# Patient Record
Sex: FEMALE | Race: WHITE | NOT HISPANIC OR LATINO | Employment: UNEMPLOYED
[De-identification: names, ages, dates, MRNs, and addresses within clinical notes are randomized per-mention and may not be internally consistent; named-entity substitution may affect disease eponyms.]

---

## 2020-07-30 ENCOUNTER — RECORDS - HEALTHEAST (OUTPATIENT)
Dept: ADMINISTRATIVE | Facility: OTHER | Age: 60
End: 2020-07-30

## 2021-05-11 ENCOUNTER — RECORDS - HEALTHEAST (OUTPATIENT)
Dept: ADMINISTRATIVE | Facility: OTHER | Age: 61
End: 2021-05-11

## 2021-07-14 ENCOUNTER — TELEPHONE (OUTPATIENT)
Dept: TRANSPLANT | Facility: CLINIC | Age: 61
End: 2021-07-14

## 2021-07-14 DIAGNOSIS — C90.01 MULTIPLE MYELOMA IN REMISSION (H): Primary | ICD-10-CM

## 2021-08-12 ENCOUNTER — MEDICAL CORRESPONDENCE (OUTPATIENT)
Dept: TRANSPLANT | Facility: CLINIC | Age: 61
End: 2021-08-12

## 2021-08-12 ENCOUNTER — OFFICE VISIT (OUTPATIENT)
Dept: TRANSPLANT | Facility: CLINIC | Age: 61
End: 2021-08-12
Attending: INTERNAL MEDICINE
Payer: COMMERCIAL

## 2021-08-12 VITALS
TEMPERATURE: 98 F | BODY MASS INDEX: 24.55 KG/M2 | HEART RATE: 82 BPM | OXYGEN SATURATION: 97 % | WEIGHT: 162 LBS | HEIGHT: 68 IN | SYSTOLIC BLOOD PRESSURE: 107 MMHG | DIASTOLIC BLOOD PRESSURE: 68 MMHG | RESPIRATION RATE: 16 BRPM

## 2021-08-12 DIAGNOSIS — C90.01 MULTIPLE MYELOMA IN REMISSION (H): ICD-10-CM

## 2021-08-12 PROCEDURE — G0463 HOSPITAL OUTPT CLINIC VISIT: HCPCS

## 2021-08-12 PROCEDURE — 99205 OFFICE O/P NEW HI 60 MIN: CPT

## 2021-08-12 PROCEDURE — 99417 PROLNG OP E/M EACH 15 MIN: CPT

## 2021-08-12 RX ORDER — LAMOTRIGINE 100 MG/1
300 TABLET ORAL AT BEDTIME
Status: ON HOLD | COMMUNITY
End: 2021-10-14

## 2021-08-12 ASSESSMENT — PAIN SCALES - GENERAL: PAINLEVEL: MILD PAIN (2)

## 2021-08-12 ASSESSMENT — MIFFLIN-ST. JEOR: SCORE: 1339.36
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
## 2021-08-12 NOTE — NURSING NOTE
"Oncology Rooming Note    August 12, 2021 2:03 PM   Mil Seals is a 61 year old female who presents for:    Chief Complaint   Patient presents with     Oncology Clinic Visit     Bone marrow transplant consultation      Initial Vitals: /68   Pulse 82   Temp 98  F (36.7  C)   Resp 16   Ht 1.721 m (5' 7.75\")   Wt 73.5 kg (162 lb)   SpO2 97%   BMI 24.81 kg/m   Estimated body mass index is 24.81 kg/m  as calculated from the following:    Height as of this encounter: 1.721 m (5' 7.75\").    Weight as of this encounter: 73.5 kg (162 lb). Body surface area is 1.87 meters squared.  Mild Pain (2) Comment: Data Unavailable   No LMP recorded. Patient is postmenopausal.  Allergies reviewed: Yes  Medications reviewed: Yes    Medications: Medication refills not needed today.  Pharmacy name entered into Ap[facility]: [id number] - [place], [address]    Clinical concerns: New patient       Peter Esteban MA            "

## 2021-08-13 ENCOUNTER — ALLIED HEALTH/NURSE VISIT (OUTPATIENT)
Dept: TRANSPLANT | Facility: CLINIC | Age: 61
End: 2021-08-13
Attending: INTERNAL MEDICINE
Payer: COMMERCIAL

## 2021-08-13 DIAGNOSIS — C90.00 MULTIPLE MYELOMA, REMISSION STATUS UNSPECIFIED (H): Primary | ICD-10-CM

## 2021-08-13 DIAGNOSIS — Z71.9 ENCOUNTER FOR COUNSELING: Primary | ICD-10-CM

## 2021-08-13 NOTE — PROGRESS NOTES
Spoke with Mil and Lori , patient's Other, following new transplant visit with Dr. Sridhar Cullen. Reviewed plan of care per NT conversation for Stem Cell Transplant. Explained role of the Nurse Coordinator throughout the BMT process as well as general time line and expectations for transplant. Discussed necessity of caregiver and program's proximity requirements. All questions were answered.     Plan: Autologous Transplant, pending completion of next cycle and arranging caregiver/local lodging    Contact information provided for :  yes    HLA typing drawn: n/a auto    PRA typing drawn:  N/a auto    CMV-IgG and ABO-Rh drawn or in record:  N/a auto    Contact information provided for :  deirdre    Financial Release for [facility] search obtained:  na    [id number] Consent Signed: na    EOC Reason updated: yes

## 2021-08-30 ENCOUNTER — SOCIAL WORK (OUTPATIENT)
Dept: TRANSPLANT | Facility: CLINIC | Age: 61
End: 2021-08-30

## 2021-08-30 NOTE — PROGRESS NOTES
Clinical   Blood and Marrow Transplant Service    Reason for Intervention: [place] Travel Reimbursement    Data: Pt is a 61 year old female diagnosed with Multiple Myeloma. Pt was diagnosed on 4/22/2021. Pt came for autologous stem cell transplant discussion.    Intervention: Pt has travel reimbursement through [employer] through [facility]. TYRONE spoke with -Sandra at [employer] (P: [phone]) and she said SW needs to call [facility] to get travel benefit information. SW called [facility] (P: [phone]) and found the following information:      Lodging - They will cover up to $70/day of the lodging stay. Member has to pay in advance and [place] will reimburse. [facility] no longer directly pays hotels in the area. Nor will they pay pt in advance. In order to be reimbursed for lodging pt has to call [facility] (P: [phone]) to get approved for lodging and reimbursed. [facility] do not have a reimbursement form for lodging.      Gas/Mileage - [facility] will reimburse for gas and mileage but you have to complete the trip form attached to this email and send back to [facility]. Form provided to pt.     Food - [facility] will reimburse for food up to $10 a meal per person. They will reimburse for up to $10 per meal for both caregiver and patient.     TYRONE also found out that [facility] s contract with [place] is ending 11/1/2021 and the company [employer] will be taking over for [place]. [employer] may or may not have travel benefits. That is to be determined.     Pt also wanted to know who she can call to set up up discounted lodging at the [facility] in [place], [place]. Provided Sara Alex- of Sales, [facility] (Phone: [phone]).     TYRONE sent all this information to pt via e-mail per pt's request. TYRONE encouraged pt to call/e-mail if she has any questions at all.     Education Provided: [facility] Travel Reimbursement    Follow-up Required: TYRONE will continue to work with this pt once she comes for transplant.    Encouraged patient/family to reach  out as questions or concerns arise.     JEMIMA Avery, LICSW  [facility]  Phone: [phone]  Pager: [phone]

## 2021-09-10 DIAGNOSIS — Z86.2 PERSONAL HISTORY OF DISEASES OF BLOOD AND BLOOD-FORMING ORGANS: ICD-10-CM

## 2021-09-10 DIAGNOSIS — C90.00 MYELOMA (H): ICD-10-CM

## 2021-09-14 ENCOUNTER — LAB (OUTPATIENT)
Dept: LAB | Facility: CLINIC | Age: 61
End: 2021-09-14
Payer: COMMERCIAL

## 2021-09-14 DIAGNOSIS — C90.00 MULTIPLE MYELOMA, REMISSION STATUS UNSPECIFIED (H): Primary | ICD-10-CM

## 2021-09-14 PROCEDURE — 88321 CONSLTJ&REPRT SLD PREP ELSWR: CPT | Mod: 26 | Performed by: PATHOLOGY

## 2021-09-15 ENCOUNTER — TELEPHONE (OUTPATIENT)
Dept: TRANSPLANT | Facility: CLINIC | Age: 61
End: 2021-09-15

## 2021-09-15 NOTE — TELEPHONE ENCOUNTER
Returned voicemail from patient regarding availably of caregiver during the work up week. Pt indicated that the caregiver would be available by phone for some of them. I returned the call and left a voice mail indicating it would be beneficial for the caregiver to join us for the pt teaching appointments as well as the close, but their physical presence is not required. Also recommended for the caregiver to be present at the line care class, and we could work on getting that rescheduled if there was a time the caregiver would be able to attend.

## 2021-09-16 ENCOUNTER — LAB (OUTPATIENT)
Dept: LAB | Facility: CLINIC | Age: 61
End: 2021-09-16
Payer: COMMERCIAL

## 2021-09-16 DIAGNOSIS — C90.00 MYELOMA (H): Primary | ICD-10-CM

## 2021-09-16 LAB
PATH REPORT.COMMENTS IMP SPEC: ABNORMAL
PATH REPORT.COMMENTS IMP SPEC: NORMAL
PATH REPORT.COMMENTS IMP SPEC: YES
PATH REPORT.FINAL DX SPEC: ABNORMAL
PATH REPORT.FINAL DX SPEC: NORMAL
PATH REPORT.GROSS SPEC: ABNORMAL
PATH REPORT.GROSS SPEC: NORMAL
PATH REPORT.MICROSCOPIC SPEC OTHER STN: ABNORMAL
PATH REPORT.MICROSCOPIC SPEC OTHER STN: NORMAL
PATH REPORT.RELEVANT HX SPEC: ABNORMAL
PATH REPORT.RELEVANT HX SPEC: ABNORMAL
PATH REPORT.RELEVANT HX SPEC: NORMAL
PATH REPORT.RELEVANT HX SPEC: NORMAL
PATH REPORT.SITE OF ORIGIN SPEC: ABNORMAL
PATH REPORT.SITE OF ORIGIN SPEC: NORMAL

## 2021-09-16 PROCEDURE — 88321 CONSLTJ&REPRT SLD PREP ELSWR: CPT | Mod: 26 | Performed by: PATHOLOGY

## 2021-09-20 ENCOUNTER — MEDICAL CORRESPONDENCE (OUTPATIENT)
Dept: TRANSPLANT | Facility: CLINIC | Age: 61
End: 2021-09-20

## 2021-09-20 ENCOUNTER — ALLIED HEALTH/NURSE VISIT (OUTPATIENT)
Dept: TRANSPLANT | Facility: CLINIC | Age: 61
End: 2021-09-20
Attending: INTERNAL MEDICINE
Payer: COMMERCIAL

## 2021-09-20 ENCOUNTER — OFFICE VISIT (OUTPATIENT)
Dept: EDUCATION SERVICES | Facility: CLINIC | Age: 61
End: 2021-09-20
Attending: INTERNAL MEDICINE
Payer: COMMERCIAL

## 2021-09-20 ENCOUNTER — HOSPITAL ENCOUNTER (OUTPATIENT)
Dept: LAB | Facility: CLINIC | Age: 61
End: 2021-09-20
Payer: COMMERCIAL

## 2021-09-20 VITALS
TEMPERATURE: 98.6 F | BODY MASS INDEX: 25.33 KG/M2 | HEART RATE: 83 BPM | RESPIRATION RATE: 16 BRPM | OXYGEN SATURATION: 98 % | HEIGHT: 67 IN | WEIGHT: 161.4 LBS | SYSTOLIC BLOOD PRESSURE: 92 MMHG | DIASTOLIC BLOOD PRESSURE: 63 MMHG

## 2021-09-20 VITALS — DIASTOLIC BLOOD PRESSURE: 56 MMHG | SYSTOLIC BLOOD PRESSURE: 105 MMHG | HEART RATE: 87 BPM

## 2021-09-20 DIAGNOSIS — C90.00 MYELOMA (H): ICD-10-CM

## 2021-09-20 DIAGNOSIS — Z86.2 PERSONAL HISTORY OF DISEASES OF BLOOD AND BLOOD-FORMING ORGANS: ICD-10-CM

## 2021-09-20 DIAGNOSIS — C90.00 MULTIPLE MYELOMA, REMISSION STATUS UNSPECIFIED (H): ICD-10-CM

## 2021-09-20 DIAGNOSIS — C90.00 MULTIPLE MYELOMA, REMISSION STATUS UNSPECIFIED (H): Primary | ICD-10-CM

## 2021-09-20 LAB
ABO/RH(D): ABNORMAL
ALBUMIN SERPL-MCNC: 3.6 G/DL (ref 3.4–5)
ALBUMIN UR-MCNC: 30 MG/DL
ALP SERPL-CCNC: 50 U/L (ref 40–150)
ALT SERPL W P-5'-P-CCNC: 20 U/L (ref 0–50)
ANION GAP SERPL CALCULATED.3IONS-SCNC: 5 MMOL/L (ref 3–14)
ANTIBODY SCREEN: POSITIVE
APPEARANCE UR: CLEAR
APTT PPP: 28 SECONDS (ref 22–38)
AST SERPL W P-5'-P-CCNC: 14 U/L (ref 0–45)
BASOPHILS # BLD AUTO: 0.1 10E3/UL (ref 0–0.2)
BASOPHILS NFR BLD AUTO: 2 %
BILIRUB SERPL-MCNC: 0.3 MG/DL (ref 0.2–1.3)
BILIRUB UR QL STRIP: NEGATIVE
BUN SERPL-MCNC: 15 MG/DL (ref 7–30)
CALCIUM SERPL-MCNC: 9.2 MG/DL (ref 8.5–10.1)
CHLORIDE BLD-SCNC: 110 MMOL/L (ref 94–109)
CO2 SERPL-SCNC: 28 MMOL/L (ref 20–32)
COLOR UR AUTO: YELLOW
CREAT SERPL-MCNC: 0.71 MG/DL (ref 0.52–1.04)
EOSINOPHIL # BLD AUTO: 0.3 10E3/UL (ref 0–0.7)
EOSINOPHIL NFR BLD AUTO: 5 %
ERYTHROCYTE [DISTWIDTH] IN BLOOD BY AUTOMATED COUNT: 14.9 % (ref 10–15)
GFR SERPL CREATININE-BSD FRML MDRD: >90 ML/MIN/1.73M2
GLUCOSE BLD-MCNC: 92 MG/DL (ref 70–99)
GLUCOSE UR STRIP-MCNC: NEGATIVE MG/DL
HCG SERPL QL: NEGATIVE
HCT VFR BLD AUTO: 39.6 % (ref 35–47)
HGB BLD-MCNC: 12.9 G/DL (ref 11.7–15.7)
HGB UR QL STRIP: NEGATIVE
HOLD SPECIMEN: NORMAL
IMM GRANULOCYTES # BLD: 0 10E3/UL
IMM GRANULOCYTES NFR BLD: 0 %
INR PPP: 1 (ref 0.85–1.15)
KETONES UR STRIP-MCNC: NEGATIVE MG/DL
LDH SERPL L TO P-CCNC: 168 U/L (ref 81–234)
LEUKOCYTE ESTERASE UR QL STRIP: NEGATIVE
LYMPHOCYTES # BLD AUTO: 1.4 10E3/UL (ref 0.8–5.3)
LYMPHOCYTES NFR BLD AUTO: 27 %
MAGNESIUM SERPL-MCNC: 2.2 MG/DL (ref 1.6–2.3)
MCH RBC QN AUTO: 29.9 PG (ref 26.5–33)
MCHC RBC AUTO-ENTMCNC: 32.6 G/DL (ref 31.5–36.5)
MCV RBC AUTO: 92 FL (ref 78–100)
MONOCYTES # BLD AUTO: 0.4 10E3/UL (ref 0–1.3)
MONOCYTES NFR BLD AUTO: 8 %
MUCOUS THREADS #/AREA URNS LPF: PRESENT /LPF
NEUTROPHILS # BLD AUTO: 3 10E3/UL (ref 1.6–8.3)
NEUTROPHILS NFR BLD AUTO: 58 %
NITRATE UR QL: NEGATIVE
NRBC # BLD AUTO: 0 10E3/UL
NRBC BLD AUTO-RTO: 0 /100
PH UR STRIP: 7 [PH] (ref 5–7)
PHOSPHATE SERPL-MCNC: 3 MG/DL (ref 2.5–4.5)
PLATELET # BLD AUTO: 286 10E3/UL (ref 150–450)
POTASSIUM BLD-SCNC: 4.1 MMOL/L (ref 3.4–5.3)
PROT SERPL-MCNC: 6.8 G/DL (ref 6.8–8.8)
RBC # BLD AUTO: 4.32 10E6/UL (ref 3.8–5.2)
RBC URINE: <1 /HPF
SODIUM SERPL-SCNC: 143 MMOL/L (ref 133–144)
SP GR UR STRIP: 1.02 (ref 1–1.03)
SPECIMEN EXPIRATION DATE: ABNORMAL
SQUAMOUS EPITHELIAL: 3 /HPF
TOTAL PROTEIN SERUM FOR ELP: 6.4 G/DL (ref 6.8–8.8)
TROPONIN I SERPL-MCNC: <0.015 UG/L (ref 0–0.04)
URATE SERPL-MCNC: 2.4 MG/DL (ref 2.6–6)
UROBILINOGEN UR STRIP-MCNC: 2 MG/DL
WBC # BLD AUTO: 5.1 10E3/UL (ref 4–11)
WBC URINE: 3 /HPF

## 2021-09-20 PROCEDURE — 84155 ASSAY OF PROTEIN SERUM: CPT | Mod: 91

## 2021-09-20 PROCEDURE — 82232 ASSAY OF BETA-2 PROTEIN: CPT

## 2021-09-20 PROCEDURE — 84484 ASSAY OF TROPONIN QUANT: CPT

## 2021-09-20 PROCEDURE — 84165 PROTEIN E-PHORESIS SERUM: CPT | Mod: 26 | Performed by: STUDENT IN AN ORGANIZED HEALTH CARE EDUCATION/TRAINING PROGRAM

## 2021-09-20 PROCEDURE — 83883 ASSAY NEPHELOMETRY NOT SPEC: CPT

## 2021-09-20 PROCEDURE — 82040 ASSAY OF SERUM ALBUMIN: CPT

## 2021-09-20 PROCEDURE — G0463 HOSPITAL OUTPT CLINIC VISIT: HCPCS

## 2021-09-20 PROCEDURE — 94729 DIFFUSING CAPACITY: CPT | Performed by: INTERNAL MEDICINE

## 2021-09-20 PROCEDURE — U0003 INFECTIOUS AGENT DETECTION BY NUCLEIC ACID (DNA OR RNA); SEVERE ACUTE RESPIRATORY SYNDROME CORONAVIRUS 2 (SARS-COV-2) (CORONAVIRUS DISEASE [COVID-19]), AMPLIFIED PROBE TECHNIQUE, MAKING USE OF HIGH THROUGHPUT TECHNOLOGIES AS DESCRIBED BY CMS-2020-01-R: HCPCS

## 2021-09-20 PROCEDURE — 85610 PROTHROMBIN TIME: CPT

## 2021-09-20 PROCEDURE — 81001 URINALYSIS AUTO W/SCOPE: CPT

## 2021-09-20 PROCEDURE — 82785 ASSAY OF IGE: CPT

## 2021-09-20 PROCEDURE — 87516 HEPATITIS B DNA AMP PROBE: CPT

## 2021-09-20 PROCEDURE — 83615 LACTATE (LD) (LDH) ENZYME: CPT

## 2021-09-20 PROCEDURE — 94060 EVALUATION OF WHEEZING: CPT | Performed by: INTERNAL MEDICINE

## 2021-09-20 PROCEDURE — 86334 IMMUNOFIX E-PHORESIS SERUM: CPT | Mod: 26 | Performed by: STUDENT IN AN ORGANIZED HEALTH CARE EDUCATION/TRAINING PROGRAM

## 2021-09-20 PROCEDURE — 86900 BLOOD TYPING SEROLOGIC ABO: CPT

## 2021-09-20 PROCEDURE — 84550 ASSAY OF BLOOD/URIC ACID: CPT

## 2021-09-20 PROCEDURE — 86334 IMMUNOFIX E-PHORESIS SERUM: CPT | Mod: TC

## 2021-09-20 PROCEDURE — 86695 HERPES SIMPLEX TYPE 1 TEST: CPT

## 2021-09-20 PROCEDURE — 86665 EPSTEIN-BARR CAPSID VCA: CPT

## 2021-09-20 PROCEDURE — 83021 HEMOGLOBIN CHROMOTOGRAPHY: CPT

## 2021-09-20 PROCEDURE — 86850 RBC ANTIBODY SCREEN: CPT

## 2021-09-20 PROCEDURE — 84100 ASSAY OF PHOSPHORUS: CPT

## 2021-09-20 PROCEDURE — 36415 COLL VENOUS BLD VENIPUNCTURE: CPT

## 2021-09-20 PROCEDURE — 82784 ASSAY IGA/IGD/IGG/IGM EACH: CPT

## 2021-09-20 PROCEDURE — 84703 CHORIONIC GONADOTROPIN ASSAY: CPT

## 2021-09-20 PROCEDURE — 86696 HERPES SIMPLEX TYPE 2 TEST: CPT

## 2021-09-20 PROCEDURE — 94726 PLETHYSMOGRAPHY LUNG VOLUMES: CPT | Performed by: INTERNAL MEDICINE

## 2021-09-20 PROCEDURE — 86870 RBC ANTIBODY IDENTIFICATION: CPT

## 2021-09-20 PROCEDURE — 86803 HEPATITIS C AB TEST: CPT

## 2021-09-20 PROCEDURE — 85730 THROMBOPLASTIN TIME PARTIAL: CPT

## 2021-09-20 PROCEDURE — 0001U RBC DNA HEA 35 AG 11 BLD GRP: CPT

## 2021-09-20 PROCEDURE — 85025 COMPLETE CBC W/AUTO DIFF WBC: CPT

## 2021-09-20 PROCEDURE — 84165 PROTEIN E-PHORESIS SERUM: CPT | Mod: TC | Performed by: STUDENT IN AN ORGANIZED HEALTH CARE EDUCATION/TRAINING PROGRAM

## 2021-09-20 PROCEDURE — 88240 CELL CRYOPRESERVE/STORAGE: CPT

## 2021-09-20 PROCEDURE — 83735 ASSAY OF MAGNESIUM: CPT

## 2021-09-20 RX ORDER — GABAPENTIN 100 MG/1
100 CAPSULE ORAL 2 TIMES DAILY
COMMUNITY
End: 2021-11-03

## 2021-09-20 RX ORDER — L. ACIDOPHILUS/PECTIN, CITRUS 25MM-100MG
2 TABLET ORAL 2 TIMES DAILY
Status: ON HOLD | COMMUNITY
End: 2021-10-15

## 2021-09-20 RX ORDER — OMEGA-3/DHA/EPA/FISH OIL 60 MG-90MG
1500 CAPSULE ORAL DAILY
Status: ON HOLD | COMMUNITY
End: 2021-10-15

## 2021-09-20 RX ORDER — ACYCLOVIR 400 MG/1
400 TABLET ORAL
COMMUNITY
End: 2021-09-20

## 2021-09-20 RX ORDER — HYDROCORTISONE 2.5 %
CREAM (GRAM) TOPICAL 2 TIMES DAILY PRN
Status: ON HOLD | COMMUNITY
End: 2021-10-14

## 2021-09-20 RX ORDER — LORATADINE 10 MG/1
10 TABLET ORAL AT BEDTIME
COMMUNITY

## 2021-09-20 RX ORDER — ALBUTEROL SULFATE 90 UG/1
2 AEROSOL, METERED RESPIRATORY (INHALATION) EVERY 4 HOURS PRN
COMMUNITY

## 2021-09-20 RX ORDER — BUDESONIDE AND FORMOTEROL FUMARATE DIHYDRATE 160; 4.5 UG/1; UG/1
2 AEROSOL RESPIRATORY (INHALATION) 2 TIMES DAILY
COMMUNITY

## 2021-09-20 RX ORDER — PROCHLORPERAZINE MALEATE 10 MG
10 TABLET ORAL EVERY 6 HOURS PRN
Status: ON HOLD | COMMUNITY
End: 2021-10-15

## 2021-09-20 RX ORDER — PRAZOSIN HYDROCHLORIDE 1 MG/1
1 CAPSULE ORAL AT BEDTIME
COMMUNITY

## 2021-09-20 RX ORDER — LORAZEPAM 0.5 MG/1
0.5 TABLET ORAL
COMMUNITY
End: 2021-11-12

## 2021-09-20 ASSESSMENT — MIFFLIN-ST. JEOR: SCORE: 1325.18

## 2021-09-20 ASSESSMENT — PAIN SCALES - GENERAL: PAINLEVEL: NO PAIN (0)

## 2021-09-20 NOTE — CONSULTS
APHERESIS INITIAL CONSULT CHECKLIST    Current Encounter Information  Current Encounter Information: Reason for Visit, Allergies and Current Meds  Procedure Requested: MNC/PBSC Collection  History of: (Reason for Apheresis): MM    Access Assessment  Access Assessment  Vein Assessment:  Veins are adequate: Yes  Needs a catheter placed for Apheresis?: Yes, transfusion medicine physician informed.    Vital Signs  Vital Signs  BP: 105/56  Pulse: 87  Temp:  (98.6)  Height:  (170.3CM)  Weight:  (73.2KG)    Reviewed   Review With Patient  Have you read the brochure Getting ready for Apheresis?: Yes  Have you had any invasive procedures, surgery, biopsy, bleeding in the last month?: No  Review medications and allergies: Yes  Patient given tour of the unit: Yes    Additional Information  Notes, needs and time spent with patient  Explain procedure, side effects or reactions, instructions: Yes  Time spent: 20 minutes spent face to face for medical history review. Reviewed need to follow low fat diet.

## 2021-09-20 NOTE — PROGRESS NOTES
Patient here for work up day. Height, weight, vital signs obtained. Med/allergy review completed. Calendar reviewed and patient was educated on tests/procedures. Blood thinners assessed. Consents obtained. Labs drawn via venipuncture. Patient provided UA sample, sent to lab. Provided patient with 24hr urine kit, she intends to return it on Wednesday morning. Patient discharged in the care of self to next work up appointment.  Patient aware of next appointment.        BMT Teaching Flowsheet    Mil Seals is a 61 year old female  Diagnoses of Personal history of diseases of blood and blood-forming organs, Myeloma (H), and Multiple myeloma, remission status unspecified (H) were pertinent to this visit.    Teaching Topic:work up routine  Person(s) involved in teaching: Patient  Motivation Level  Asks Questions: Yes  Eager to Learn: Yes  Cooperative: Yes  Receptive (willing/able to accept information): Yes    Patient demonstrates understanding of the following:  - Reason for the appointment, diagnosis and treatment plan: Yes     - Which situations necessitate calling provider and whom to contact: Yes    Teaching concerns addressed: reviewed calendar and explained all unfamiliar tests and locations of procedures  Pt instructed to check with  daily for schedule changes    Patient instructed on hand hygiene: Yes      Instructional Materials Used/Given:verbal, copy of calendar, map of campus.     Specific Concerns: NA

## 2021-09-20 NOTE — CONSULTS
Transfusion Medicine Consultation    Mil Seals [id number]   YOB: 1960 Age: 61 year old   Date of Consult: 9/20/2021     Reason for consult: Autologous Hematopoietic Progenitor Cell (HPC)  Collection           Assessment and Plan:   61 year old female presents for consultation for autologous HPC collection for multiple myeloma.  The plan is to collect for 1 to 3 days or until the target goal is met.   The patient will require line placement for the collection procedure..          Chief Complaint:   Transfusion medicine consultation.         History of Present Illness:   61 year old female presents for consultation for autologous  HPC  collection.  Her past medical history includes multiple myeloma.  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  She has received radiation and additional treatment for her myeloma.  She is currently well.   No significant travel history.  The patient has no identifiable risk factors for infectious disease.  The procedure, risks/benefits were discussed with the patient and all of her questions were addressed at this time.  Consent was obtained             Past Medical History:   Multiple myeloma  Asthma          Past Surgical History:   No past surgical history on file.           Social History:   Lives in [facility].            Allergies:     Allergies   Allergen Reactions     Azithromycin Nausea     Codeine      Hydrocodone Nausea and Vomiting     Morphine Nausea and Vomiting     Mushroom      fungus     Penicillins      As child     Sulfa Drugs Rash             Medications:     Current Outpatient Medications   Medication Sig     albuterol (PROAIR HFA/PROVENTIL HFA/VENTOLIN HFA) 108 (90 Base) MCG/ACT inhaler Inhale 2 puffs into the lungs every 4 hours as needed for shortness of breath / dyspnea or wheezing     Ascorbic Acid (VITAMIN C) 500 MG CHEW      budesonide-formoterol  (SYMBICORT) 160-4.5 MCG/ACT Inhaler Inhale 2 puffs into the lungs 2 times daily As needed     calcium carbonate (OS-BRUNA) 1500 (600 Ca) MG tablet Take 1,530 mg by mouth 2 times daily (with meals)     fish oil-omega-3 fatty acids 500 MG capsule      gabapentin (NEURONTIN) 100 MG capsule Take 100 mg by mouth 2 times daily Taking prn     hydrocortisone 2.5 % cream Apply topically 2 times daily For vaginal dryness     Lactobacillus Acid-Pectin (LACTOBACILLUS ACIDOPHILUS) TABS Take 1 tablet by mouth 3 times daily (before meals)     lamoTRIgine (LAMICTAL) 100 MG tablet Take 300 mg by mouth daily     loratadine (CLARITIN) 10 MG tablet Take 10 mg by mouth daily     LORazepam (ATIVAN) 0.5 MG tablet Take 0.5 mg by mouth nightly as needed for anxiety     prazosin (MINIPRESS) 1 MG capsule Take 1 mg by mouth 3 times daily     prochlorperazine (COMPAZINE) 10 MG tablet Take 10 mg by mouth every 6 hours as needed for nausea or vomiting     vitamin D3 (CHOLECALCIFEROL) 250 mcg (37988 units) capsule Take 1 capsule by mouth daily     Nutritional Supplements (ADULT NUTRITIONAL SUPPLEMENT + OR) DIM 900mg daily     Nutritional Supplements (ADULT NUTRITIONAL SUPPLEMENT + OR) Tryphylla 12,000mg daily     Nutritional Supplements (ADULT NUTRITIONAL SUPPLEMENT + OR) Calm powder- magnesium/calcium supplement     No current facility-administered medications for this encounter.             Review of Systems:     CONSTITUTIONAL: negative for  fevers and chills  RESPIRATORY:  positive for  Shortness of breath related to her asthma, has been using her inhaler  negative for  cough  CARDIOVASCULAR:  negative for  chest pain  GASTROINTESTINAL:  negative for nausea, vomiting and change in bowel habits  NEUROLOGICAL:  positive for headaches.  She notes some tingling when she started chemotherapy but this has improved.  Negative for seizures.           Exam:   /56   Pulse 87    T 98.6    Alert, no apparent distress  Breathing appears comfortable on  room air            Data:       BMPRecent Labs   Lab 09/20/21  1216      POTASSIUM 4.1   CHLORIDE 110*   BRUNA 9.2   CO2 28   BUN 15   CR 0.71   GLC 92     CBC  Recent Labs   Lab 09/20/21  1216   WBC 5.1   RBC 4.32   HGB 12.9   HCT 39.6   MCV 92   MCH 29.9   MCHC 32.6   RDW 14.9        INR  Recent Labs   Lab 09/20/21  1215   INR 1.00           The patient was directly seen and evaluated by me, Dyllan Piña MD.  The pathology resident, Simone Garces, was also present for the evaluation.    Dyllan Piña MD  Transfusion Medicine Attending  Laboratory Medicine and Pathology  Pager [phone]

## 2021-09-21 ENCOUNTER — HOSPITAL ENCOUNTER (OUTPATIENT)
Dept: PET IMAGING | Facility: CLINIC | Age: 61
Discharge: HOME | End: 2021-09-21
Attending: INTERNAL MEDICINE | Admitting: INTERNAL MEDICINE
Payer: COMMERCIAL

## 2021-09-21 DIAGNOSIS — C90.00 MYELOMA (H): ICD-10-CM

## 2021-09-21 DIAGNOSIS — Z86.2 PERSONAL HISTORY OF DISEASES OF BLOOD AND BLOOD-FORMING ORGANS: ICD-10-CM

## 2021-09-21 LAB
B2 MICROGLOB TUMOR MARKER SER-MCNC: 2 MG/L
DLCOCOR-%PRED-PRE: 97 %
DLCOCOR-PRE: 21.69 ML/MIN/MMHG
DLCOUNC-%PRED-PRE: 96 %
DLCOUNC-PRE: 21.35 ML/MIN/MMHG
DLCOUNC-PRED: 22.16 ML/MIN/MMHG
EBV VCA IGG SER IA-ACNC: 104 U/ML
EBV VCA IGG SER IA-ACNC: POSITIVE
ERV-%PRED-PRE: 54 %
ERV-PRE: 0.48 L
ERV-PRED: 0.89 L
EXPTIME-PRE: 7.82 SEC
FEF2575-%PRED-POST: 63 %
FEF2575-%PRED-PRE: 28 %
FEF2575-POST: 1.48 L/SEC
FEF2575-PRE: 0.67 L/SEC
FEF2575-PRED: 2.33 L/SEC
FEFMAX-%PRED-PRE: 58 %
FEFMAX-PRE: 3.88 L/SEC
FEFMAX-PRED: 6.65 L/SEC
FEV1-%PRED-PRE: 53 %
FEV1-PRE: 1.44 L
FEV1FEV6-PRE: 62 %
FEV1FEV6-PRED: 80 %
FEV1FVC-PRE: 59 %
FEV1FVC-PRED: 79 %
FEV1SVC-PRE: 54 %
FEV1SVC-PRED: 75 %
FIFMAX-PRE: 3.68 L/SEC
FRCPLETH-%PRED-PRE: 152 %
FRCPLETH-PRE: 4.38 L
FRCPLETH-PRED: 2.87 L
FVC-%PRED-PRE: 71 %
FVC-PRE: 2.45 L
FVC-PRED: 3.44 L
HSV1 IGG SERPL QL IA: 5.75 INDEX
HSV1 IGG SERPL QL IA: ABNORMAL
HSV2 IGG SERPL QL IA: 0.04 INDEX
HSV2 IGG SERPL QL IA: ABNORMAL
IC-%PRED-PRE: 80 %
IC-PRE: 2.19 L
IC-PRED: 2.72 L
IGA SERPL-MCNC: 10 MG/DL (ref 84–499)
IGG SERPL-MCNC: 388 MG/DL (ref 610–1616)
IGM SERPL-MCNC: <10 MG/DL (ref 35–242)
KAPPA LC FREE SER-MCNC: 0.56 MG/DL (ref 0.33–1.94)
KAPPA LC FREE/LAMBDA FREE SER NEPH: 2.33 {RATIO} (ref 0.26–1.65)
LAMBDA LC FREE SERPL-MCNC: 0.24 MG/DL (ref 0.57–2.63)
PROT PATTERN SERPL IFE-IMP: NORMAL
RVPLETH-%PRED-PRE: 188 %
RVPLETH-PRE: 3.9 L
RVPLETH-PRED: 2.07 L
SARS-COV-2 RNA RESP QL NAA+PROBE: NEGATIVE
TLCPLETH-%PRED-PRE: 120 %
TLCPLETH-PRE: 6.57 L
TLCPLETH-PRED: 5.44 L
VA-%PRED-PRE: 88 %
VA-PRE: 4.8 L
VC-%PRED-PRE: 74 %
VC-PRE: 2.68 L
VC-PRED: 3.61 L

## 2021-09-21 PROCEDURE — 84166 PROTEIN E-PHORESIS/URINE/CSF: CPT | Mod: 26 | Performed by: PATHOLOGY

## 2021-09-21 PROCEDURE — A9552 F18 FDG: HCPCS | Performed by: INTERNAL MEDICINE

## 2021-09-21 PROCEDURE — 81050 URINALYSIS VOLUME MEASURE: CPT | Performed by: PATHOLOGY

## 2021-09-21 PROCEDURE — 343N000001 HC RX 343: Performed by: INTERNAL MEDICINE

## 2021-09-21 PROCEDURE — 86335 IMMUNFIX E-PHORSIS/URINE/CSF: CPT | Mod: 26 | Performed by: PATHOLOGY

## 2021-09-21 PROCEDURE — 78816 PET IMAGE W/CT FULL BODY: CPT | Mod: 26 | Performed by: RADIOLOGY

## 2021-09-21 PROCEDURE — 78816 PET IMAGE W/CT FULL BODY: CPT | Mod: PS

## 2021-09-21 PROCEDURE — 84156 ASSAY OF PROTEIN URINE: CPT | Performed by: PATHOLOGY

## 2021-09-21 RX ADMIN — FLUDEOXYGLUCOSE F-18 13.13 MCI.: 500 INJECTION, SOLUTION INTRAVENOUS at 14:50

## 2021-09-22 ENCOUNTER — ALLIED HEALTH/NURSE VISIT (OUTPATIENT)
Dept: TRANSPLANT | Facility: CLINIC | Age: 61
End: 2021-09-22
Attending: INTERNAL MEDICINE
Payer: COMMERCIAL

## 2021-09-22 ENCOUNTER — ANCILLARY PROCEDURE (OUTPATIENT)
Dept: CARDIOLOGY | Facility: CLINIC | Age: 61
End: 2021-09-22
Attending: INTERNAL MEDICINE
Payer: COMMERCIAL

## 2021-09-22 ENCOUNTER — ANCILLARY PROCEDURE (OUTPATIENT)
Dept: GENERAL RADIOLOGY | Facility: CLINIC | Age: 61
End: 2021-09-22
Attending: INTERNAL MEDICINE
Payer: COMMERCIAL

## 2021-09-22 ENCOUNTER — OFFICE VISIT (OUTPATIENT)
Dept: TRANSPLANT | Facility: CLINIC | Age: 61
End: 2021-09-22
Attending: PHYSICIAN ASSISTANT
Payer: COMMERCIAL

## 2021-09-22 ENCOUNTER — APPOINTMENT (OUTPATIENT)
Dept: LAB | Facility: CLINIC | Age: 61
End: 2021-09-22
Attending: INTERNAL MEDICINE
Payer: COMMERCIAL

## 2021-09-22 VITALS
BODY MASS INDEX: 25.62 KG/M2 | HEART RATE: 74 BPM | RESPIRATION RATE: 16 BRPM | TEMPERATURE: 97 F | WEIGHT: 163.7 LBS | OXYGEN SATURATION: 99 % | SYSTOLIC BLOOD PRESSURE: 101 MMHG | DIASTOLIC BLOOD PRESSURE: 58 MMHG

## 2021-09-22 VITALS
RESPIRATION RATE: 16 BRPM | OXYGEN SATURATION: 99 % | DIASTOLIC BLOOD PRESSURE: 70 MMHG | HEART RATE: 65 BPM | TEMPERATURE: 98.2 F | SYSTOLIC BLOOD PRESSURE: 125 MMHG | BODY MASS INDEX: 25.71 KG/M2 | WEIGHT: 163.8 LBS | HEIGHT: 67 IN

## 2021-09-22 VITALS
BODY MASS INDEX: 25.62 KG/M2 | TEMPERATURE: 97 F | DIASTOLIC BLOOD PRESSURE: 58 MMHG | OXYGEN SATURATION: 99 % | WEIGHT: 163.7 LBS | SYSTOLIC BLOOD PRESSURE: 101 MMHG | HEART RATE: 74 BPM | RESPIRATION RATE: 16 BRPM

## 2021-09-22 DIAGNOSIS — C90.00 MULTIPLE MYELOMA, REMISSION STATUS UNSPECIFIED (H): Primary | ICD-10-CM

## 2021-09-22 DIAGNOSIS — Z86.2 PERSONAL HISTORY OF DISEASES OF BLOOD AND BLOOD-FORMING ORGANS: ICD-10-CM

## 2021-09-22 DIAGNOSIS — C90.00 MYELOMA (H): ICD-10-CM

## 2021-09-22 LAB
3D LVEF ECHO: NORMAL
ALBUMIN SERPL ELPH-MCNC: 4.4 G/DL (ref 3.7–5.1)
ALPHA1 GLOB SERPL ELPH-MCNC: 0.3 G/DL (ref 0.2–0.4)
ALPHA2 GLOB SERPL ELPH-MCNC: 0.7 G/DL (ref 0.5–0.9)
ANTIBODY SCREEN, TUBE: NORMAL
ANTIBODY UNIDENTIFIED: NORMAL
B-GLOBULIN SERPL ELPH-MCNC: 0.6 G/DL (ref 0.6–1)
BASOPHILS # BLD AUTO: 0.1 10E3/UL (ref 0–0.2)
BASOPHILS NFR BLD AUTO: 2 %
COLLECT DURATION TIME UR: 24 H
CREAT 24H UR-MRATE: 0.84 G/SPEC (ref 0.8–1.8)
CREAT UR-MCNC: 84 MG/DL
EOSINOPHIL # BLD AUTO: 0.3 10E3/UL (ref 0–0.7)
EOSINOPHIL NFR BLD AUTO: 7 %
ERYTHROCYTE [DISTWIDTH] IN BLOOD BY AUTOMATED COUNT: 15.2 % (ref 10–15)
GAMMA GLOB SERPL ELPH-MCNC: 0.3 G/DL (ref 0.7–1.6)
HCT VFR BLD AUTO: 37.5 % (ref 35–47)
HGB BLD-MCNC: 12.1 G/DL (ref 11.7–15.7)
HGB S BLD QL: NEGATIVE
HOLD SPECIMEN: NORMAL
IGD SER-MCNC: <1.3 MG/DL
IMM GRANULOCYTES # BLD: 0 10E3/UL
IMM GRANULOCYTES NFR BLD: 0 %
LVEF ECHO: NORMAL
LYMPHOCYTES # BLD AUTO: 1.4 10E3/UL (ref 0.8–5.3)
LYMPHOCYTES NFR BLD AUTO: 30 %
M PROTEIN SERPL ELPH-MCNC: 0.1 G/DL
MCH RBC QN AUTO: 30.3 PG (ref 26.5–33)
MCHC RBC AUTO-ENTMCNC: 32.3 G/DL (ref 31.5–36.5)
MCV RBC AUTO: 94 FL (ref 78–100)
MONOCYTES # BLD AUTO: 0.5 10E3/UL (ref 0–1.3)
MONOCYTES NFR BLD AUTO: 10 %
NEUTROPHILS # BLD AUTO: 2.4 10E3/UL (ref 1.6–8.3)
NEUTROPHILS NFR BLD AUTO: 51 %
NRBC # BLD AUTO: 0 10E3/UL
NRBC BLD AUTO-RTO: 0 /100
PLATELET # BLD AUTO: 256 10E3/UL (ref 150–450)
PROT 24H UR-MRATE: 0.12 G/SPEC (ref 0.04–0.23)
PROT PATTERN SERPL ELPH-IMP: ABNORMAL
PROT UR-MCNC: 0.12 G/L
PROT/CREAT 24H UR: 0.14 G/G CR (ref 0–0.2)
RBC # BLD AUTO: 3.99 10E6/UL (ref 3.8–5.2)
SPECIMEN EXPIRATION DATE: NORMAL
SPECIMEN EXPIRATION DATE: NORMAL
SPECIMEN VOL UR: 999 ML
WBC # BLD AUTO: 4.8 10E3/UL (ref 4–11)

## 2021-09-22 PROCEDURE — 88184 FLOWCYTOMETRY/ TC 1 MARKER: CPT

## 2021-09-22 PROCEDURE — 99215 OFFICE O/P EST HI 40 MIN: CPT | Mod: 25

## 2021-09-22 PROCEDURE — 88368 INSITU HYBRIDIZATION MANUAL: CPT | Mod: 26 | Performed by: MEDICAL GENETICS

## 2021-09-22 PROCEDURE — 71046 X-RAY EXAM CHEST 2 VIEWS: CPT | Performed by: RADIOLOGY

## 2021-09-22 PROCEDURE — 88313 SPECIAL STAINS GROUP 2: CPT | Mod: 26 | Performed by: PATHOLOGY

## 2021-09-22 PROCEDURE — 93005 ELECTROCARDIOGRAM TRACING: CPT

## 2021-09-22 PROCEDURE — 85097 BONE MARROW INTERPRETATION: CPT | Performed by: PATHOLOGY

## 2021-09-22 PROCEDURE — 88275 CYTOGENETICS 100-300: CPT

## 2021-09-22 PROCEDURE — 88184 FLOWCYTOMETRY/ TC 1 MARKER: CPT | Performed by: PATHOLOGY

## 2021-09-22 PROCEDURE — 93010 ELECTROCARDIOGRAM REPORT: CPT | Performed by: INTERNAL MEDICINE

## 2021-09-22 PROCEDURE — 93306 TTE W/DOPPLER COMPLETE: CPT | Performed by: INTERNAL MEDICINE

## 2021-09-22 PROCEDURE — 88187 FLOWCYTOMETRY/READ 2-8: CPT | Mod: XS | Performed by: PATHOLOGY

## 2021-09-22 PROCEDURE — G0463 HOSPITAL OUTPT CLINIC VISIT: HCPCS

## 2021-09-22 PROCEDURE — 88185 FLOWCYTOMETRY/TC ADD-ON: CPT

## 2021-09-22 PROCEDURE — 88369 M/PHMTRC ALYSISHQUANT/SEMIQ: CPT | Mod: 26 | Performed by: MEDICAL GENETICS

## 2021-09-22 PROCEDURE — 88313 SPECIAL STAINS GROUP 2: CPT | Mod: TC

## 2021-09-22 PROCEDURE — 88280 CHROMOSOME KARYOTYPE STUDY: CPT

## 2021-09-22 PROCEDURE — 38222 DX BONE MARROW BX & ASPIR: CPT

## 2021-09-22 PROCEDURE — 85060 BLOOD SMEAR INTERPRETATION: CPT | Performed by: PATHOLOGY

## 2021-09-22 PROCEDURE — 36415 COLL VENOUS BLD VENIPUNCTURE: CPT | Performed by: PHYSICIAN ASSISTANT

## 2021-09-22 PROCEDURE — 250N000011 HC RX IP 250 OP 636: Performed by: NURSE PRACTITIONER

## 2021-09-22 PROCEDURE — 88291 CYTO/MOLECULAR REPORT: CPT | Performed by: MEDICAL GENETICS

## 2021-09-22 PROCEDURE — 88188 FLOWCYTOMETRY/READ 9-15: CPT | Performed by: PATHOLOGY

## 2021-09-22 PROCEDURE — 88237 TISSUE CULTURE BONE MARROW: CPT

## 2021-09-22 PROCEDURE — 85025 COMPLETE CBC W/AUTO DIFF WBC: CPT | Performed by: PHYSICIAN ASSISTANT

## 2021-09-22 PROCEDURE — G0463 HOSPITAL OUTPT CLINIC VISIT: HCPCS | Mod: 25

## 2021-09-22 PROCEDURE — 88271 CYTOGENETICS DNA PROBE: CPT

## 2021-09-22 RX ORDER — ONDANSETRON 8 MG/1
8 TABLET, FILM COATED ORAL
COMMUNITY
Start: 2020-08-03 | End: 2021-10-05

## 2021-09-22 RX ADMIN — MIDAZOLAM HYDROCHLORIDE 2 MG: 1 INJECTION, SOLUTION INTRAMUSCULAR; INTRAVENOUS at 10:52

## 2021-09-22 ASSESSMENT — PAIN SCALES - GENERAL
PAINLEVEL: NO PAIN (1)
PAINLEVEL: NO PAIN (1)

## 2021-09-22 ASSESSMENT — MIFFLIN-ST. JEOR: SCORE: 1336.37
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
## 2021-09-22 NOTE — NURSING NOTE
"Oncology Rooming Note    September 22, 2021 10:07 AM   Mil Seals is a 61 year old female who presents for:    Chief Complaint   Patient presents with     Oncology Clinic Visit     Myeloma (H     Initial Vitals: /58 (Patient Position: Sitting)   Pulse 74   Temp 97  F (36.1  C) (Tympanic)   Resp 16   Wt 74.3 kg (163 lb 11.2 oz)   SpO2 99%   BMI 25.62 kg/m   Estimated body mass index is 25.62 kg/m  as calculated from the following:    Height as of 9/20/21: 1.703 m (5' 7.03\").    Weight as of this encounter: 74.3 kg (163 lb 11.2 oz). Body surface area is 1.87 meters squared.  No Pain (1) Comment: Data Unavailable   No LMP recorded. Patient is postmenopausal.  Allergies reviewed: Yes  Medications reviewed: Yes    Medications: MEDICATION REFILLS NEEDED TODAY. Provider was notified.  Pharmacy name entered into Sh[facility]: [id number] - [address]    Clinical concerns: New concerns- Please discuss frailty assessment. Patient needs refill of Ativan.        Angela Johnson LPN September 22, 2021 10:08 AM                "

## 2021-09-22 NOTE — PROGRESS NOTES
BMT ONC Adult Bone Marrow Biopsy Procedure Note  September 22, 2021  /58   Pulse 74   Temp 97  F (36.1  C) (Tympanic)   Resp 16   Wt 74.3 kg (163 lb 11.2 oz)   SpO2 99%   BMI 25.62 kg/m       Learning needs assessment complete within 12 months? YES    DIAGNOSIS: MM     PROCEDURE: Unilateral Bone Marrow Biopsy and Unilateral Aspirate    [place] [facility]    Patient s identification was positively verified by verbal identification and invasive procedure safety checklist was completed. Informed consent was obtained. Following the administration of Midazolam 2mg  as pre-medication, patient was placed in the prone position and prepped and draped in a sterile manner. Approximately 15 cc of 1% Lidocaine was used over the left posterior iliac spine. Following this a 3 mm incision was made. Trephine bone marrow core(s) was (were) obtained from the [facility]. Bone marrow aspirates were obtained from the [facility]. Aspirates were sent for morphology, immunophenotyping and cytogenetics. A total of approximately 20 ml of marrow was aspirated. Following this procedure a sterile dressing was applied to the bone marrow biopsy site(s). The patient was placed in the supine position to maintain pressure on the biopsy site. Post-procedure wound care instructions were given.     Complications: NO    Pre-procedural pain: 0 out of 10 on the numeric pain rating scale.     Procedural pain: 4 out of 10 on the numeric pain rating scale.     Post-procedural pain assessment: 0 out of 10 on the numeric pain rating scale.     Interventions: NO    Length of procedure:20 minutes or less      Procedure performed by: Louise Russo

## 2021-09-22 NOTE — NURSING NOTE
EKG was performed today per order written by Dr. Joyner.  Name and  verified with patient.    Code C90.00    Peter Esteban MA

## 2021-09-22 NOTE — NURSING NOTE
BMT Teaching Flowsheet   Teaching Topic: post biopsy instructions    Person(s) involved in teaching: Patient  Motivation Level  Asks Questions: Yes  Eager to Learn: Yes  Cooperative: Yes  Receptive (willing/able to accept information): Yes    Patient demonstrates understanding of the following:   - Reason for the appointment, diagnosis and treatment plan: Yes  - Knowledge of proper use of medications and conditions for which they are ordered (with special attention to potential side effects or drug interactions): Yes  - Which situations necessitate calling provider and whom to contact: Yes    Teaching concerns addressed: reviewed activity restrictions if received premeds, potential for bleeding and actions to take if develops any of the issues below    Proper use and care of (medical equipment, care aids, etc.) Yes  Pain management techniques: Yes  Patient instructed on hand hygiene: Yes  How and/when to access community resources: Yes    Infection Control:  Patient demonstrates understanding of the following:   Surgical procedure site care taught NA  Signs and symptoms of infection taught Yes  Wound care taught Yes    Teaching concerns addressed: Bone marrow biopsy and infection prevention.      Instructional Materials Used/Given: Pt instructed to keep bmbx site clean and dry for 24hrs. Pt educated to monitor site for signs of infection such as redness, rash, oozing, puss, bleeding, pain, and elevated temp. Pt instructed to go to ER if any signs of infection should occur. Pt educated to not operate machinery due to receiving versed. Pt verbalize understanding.     Post procedure: Pt vss, ambulating, site is c,d,i. PIV d/c'd. Pt d/c'd in stable.      Time spent with patient: 0 minutes.

## 2021-09-22 NOTE — LETTER
9/22/2021         RE: Mil Seals  [address]  [facility] [address]        Dear Colleague,    Thank you for referring your patient, Mil Seals, to the [facility] BLOOD AND MARROW TRANSPLANT PROGRAM [place]. Please see a copy of my visit note below.    BMT ONC Adult Bone Marrow Biopsy Procedure Note  September 22, 2021  /58   Pulse 74   Temp 97  F (36.1  C) (Tympanic)   Resp 16   Wt 74.3 kg (163 lb 11.2 oz)   SpO2 99%   BMI 25.62 kg/m       Learning needs assessment complete within 12 months? YES    DIAGNOSIS: MM     PROCEDURE: Unilateral Bone Marrow Biopsy and Unilateral Aspirate    [place] [facility] [place] [facility] [place]    Patient s identification was positively verified by verbal identification and invasive procedure safety checklist was completed. Informed consent was obtained. Following the administration of Midazolam 2mg  as pre-medication, patient was placed in the prone position and prepped and draped in a sterile manner. Approximately 15 cc of 1% Lidocaine was used over the left posterior iliac spine. Following this a 3 mm incision was made. Trephine bone marrow core(s) was (were) obtained from the [facility]. Bone marrow aspirates were obtained from the [facility]. Aspirates were sent for morphology, immunophenotyping and cytogenetics. A total of approximately 20 ml of marrow was aspirated. Following this procedure a sterile dressing was applied to the bone marrow biopsy site(s). The patient was placed in the supine position to maintain pressure on the biopsy site. Post-procedure wound care instructions were given.     Complications: NO    Pre-procedural pain: 0 out of 10 on the numeric pain rating scale.     Procedural pain: 4 out of 10 on the numeric pain rating scale.     Post-procedural pain assessment: 0 out of 10 on the numeric pain rating scale.     Interventions: NO    Length of procedure:20 minutes or less      Procedure performed by: Louise Russo        Again, thank you for  allowing me to participate in the care of your patient.        Sincerely,        [facility] MARROW [facility]SY

## 2021-09-22 NOTE — NURSING NOTE
Chief Complaint   Patient presents with     Blood Draw     Labs drawn via PIV placed by Rn in lab. VS taken.      Labs drawn from PIV placed by RN. Line flushed with saline. Vitals taken. Pt checked in for appointment(s).    Danika KUMAR RN PHN BSN  BMT/Oncology Lab
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
## 2021-09-23 ENCOUNTER — VIRTUAL VISIT (OUTPATIENT)
Dept: TRANSPLANT | Facility: CLINIC | Age: 61
End: 2021-09-23
Attending: INTERNAL MEDICINE
Payer: COMMERCIAL

## 2021-09-23 DIAGNOSIS — C90.00 MULTIPLE MYELOMA, REMISSION STATUS UNSPECIFIED (H): ICD-10-CM

## 2021-09-23 DIAGNOSIS — C90.00 MYELOMA (H): ICD-10-CM

## 2021-09-23 DIAGNOSIS — C90.00 MULTIPLE MYELOMA, REMISSION STATUS UNSPECIFIED (H): Primary | ICD-10-CM

## 2021-09-23 DIAGNOSIS — Z71.9 VISIT FOR COUNSELING: Primary | ICD-10-CM

## 2021-09-23 DIAGNOSIS — Z86.2 PERSONAL HISTORY OF DISEASES OF BLOOD AND BLOOD-FORMING ORGANS: ICD-10-CM

## 2021-09-23 LAB
ALPHA1 GLOB MFR UR ELPH: 0 %
ALPHA2 GLOB MFR UR ELPH: 0 %
ATRIAL RATE - MUSE: 71 BPM
B-GLOBULIN MFR UR ELPH: 0 %
DIASTOLIC BLOOD PRESSURE - MUSE: NORMAL MMHG
DONOR CYTOMEGALOVIRUS ABY: POSITIVE
DONOR HEP B CORE ABY: ABNORMAL
DONOR HEP B SURF AGN: ABNORMAL
DONOR HEPATITIS C ABY: ABNORMAL
DONOR HTLV 1&2 ANTIBODY: ABNORMAL
DONOR TREPONEMA PAL ABY: ABNORMAL
GAMMA GLOB MFR UR ELPH: 0 %
HBV DNA SERPL QL NAA+PROBE: NORMAL
HCV RNA SERPL QL NAA+PROBE: NORMAL
HIV1+2 AB SERPL QL IA: ABNORMAL
HIV1+2 RNA SERPL QL NAA+PROBE: NORMAL
IGE SERPL-ACNC: 20 KU/L (ref 0–114)
INTERPRETATION ECG - MUSE: NORMAL
M PROTEIN MFR UR ELPH: 0 %
P AXIS - MUSE: 81 DEGREES
PATH REPORT.COMMENTS IMP SPEC: NORMAL
PATH REPORT.FINAL DX SPEC: NORMAL
PATH REPORT.FINAL DX SPEC: NORMAL
PATH REPORT.MICROSCOPIC SPEC OTHER STN: NORMAL
PATH REPORT.MICROSCOPIC SPEC OTHER STN: NORMAL
PATH REPORT.RELEVANT HX SPEC: NORMAL
PATH REPORT.RELEVANT HX SPEC: NORMAL
PR INTERVAL - MUSE: 176 MS
PROT ELPH PNL UR ELPH: NORMAL
PROT PATTERN UR ELPH-IMP: NORMAL
QRS DURATION - MUSE: 80 MS
QT - MUSE: 412 MS
QTC - MUSE: 447 MS
R AXIS - MUSE: 57 DEGREES
SYSTOLIC BLOOD PRESSURE - MUSE: NORMAL MMHG
T AXIS - MUSE: 76 DEGREES
TRYPANOSOMA CRUZI: ABNORMAL
VENTRICULAR RATE- MUSE: 71 BPM
WNV RNA SERPL DONR QL NAA+PROBE: NORMAL

## 2021-09-23 ASSESSMENT — ANXIETY QUESTIONNAIRES
IF YOU CHECKED OFF ANY PROBLEMS ON THIS QUESTIONNAIRE, HOW DIFFICULT HAVE THESE PROBLEMS MADE IT FOR YOU TO DO YOUR WORK, TAKE CARE OF THINGS AT HOME, OR GET ALONG WITH OTHER PEOPLE: SOMEWHAT DIFFICULT
5. BEING SO RESTLESS THAT IT IS HARD TO SIT STILL: NOT AT ALL
2. NOT BEING ABLE TO STOP OR CONTROL WORRYING: MORE THAN HALF THE DAYS
6. BECOMING EASILY ANNOYED OR IRRITABLE: MORE THAN HALF THE DAYS
1. FEELING NERVOUS, ANXIOUS, OR ON EDGE: NEARLY EVERY DAY
GAD7 TOTAL SCORE: 13
3. WORRYING TOO MUCH ABOUT DIFFERENT THINGS: NOT AT ALL
7. FEELING AFRAID AS IF SOMETHING AWFUL MIGHT HAPPEN: NEARLY EVERY DAY

## 2021-09-23 ASSESSMENT — PATIENT HEALTH QUESTIONNAIRE - PHQ9
5. POOR APPETITE OR OVEREATING: NEARLY EVERY DAY
SUM OF ALL RESPONSES TO PHQ QUESTIONS 1-9: 19

## 2021-09-23 NOTE — LETTER
"    9/23/2021         RE: Mil Seals  [address]  [facility] [address]        Dear Colleague,    Thank you for referring your patient, Mil Seals, to the [facility] BLOOD AND MARROW TRANSPLANT PROGRAM [place]. Please see a copy of my visit note below.    Pharmacy Assessment - Pre-Stem Cell Transplant    Assessments & Recommendations:  1) Sulfa intolerance - rash (she recalls ~40yrs ago).  She is willing to rechallenge Bactrim and monitor for rash.  2) PCN intolerance - feels ill, nausea/vomitting.  Cefepime will be ok to use if needed for febrile neutropenia.   3) Recommend to hold nutritional supplements until recovered from melphalan.  She expressed that she wants to continue probiotics and triphala supplement which help for IBS.  Explained why we hold probiotics when gut is friable after transplant.  Triphala (Phyllanthus emblica, Terminalia chebula, Terminalia bellerica) should be held as it has potential liver toxicity and may inhibit platelet function.   4) Ice oral cryotherapy for 2-4 hours around melphalan.     If this patient is admitted under observation, the patient may bring in their own supply of the following medication for use in the hospital:  1) albuterol inhaler  2) Symbicort inhaler  -Per \"Medications Not Supplied by Pharmacy\" policy (available on [employer])    History of Present Illness:  Mil Seals is a 61 year old year old female diagnosed with multiple myeloma.  She has been treated with daratumumab/Velcade/Revlimid/dexamethasone.  She is now being work up for autologous Stem Cell Transplant on protocol 2016-35, which utilizes melphalan as a conditioning regimen.    Pertinent labs/tests:  Viral Testing:  HSV(+) / EBV(+)  Ejection Fraction: 60-65% (9/22/21)  QTc: 447msec (9/22/21)    Weights:   Wt Readings from Last 3 Encounters:   09/22/21 74.3 kg (163 lb 12.8 oz)   09/22/21 74.3 kg (163 lb 11.2 oz)   09/22/21 74.3 kg (163 lb 11.2 oz)   Ideal body " weight: 61.7 kg (136 lb 0.7 oz)  Adjusted ideal body weight: 66.7 kg (147 lb 2.3 oz)  % IBW:  120%  There is no height or weight on file to calculate BMI.    Primary BMT Physician: Dr Joyner  BMT RN Coordinator:  Karen Drummond    Past Medical History:  No past medical history on file.    Medication Allergies:  Allergies   Allergen Reactions     Azithromycin Nausea     Codeine      Hydrocodone Nausea and Vomiting     Morphine Nausea and Vomiting     Mushroom      fungus     Penicillins      As child. Nausea, feels ill.      Sulfa Drugs Rash     Long time ago       Current Medications (pre-admit):  Current Outpatient Medications   Medication Sig Dispense Refill     albuterol (PROAIR HFA/PROVENTIL HFA/VENTOLIN HFA) 108 (90 Base) MCG/ACT inhaler Inhale 2 puffs into the lungs every 4 hours as needed for shortness of breath / dyspnea or wheezing       Ascorbic Acid (VITAMIN C) 500 MG CHEW Take by mouth daily        budesonide-formoterol (SYMBICORT) 160-4.5 MCG/ACT Inhaler Inhale 2 puffs into the lungs 2 times daily As needed       calcium carbonate (OS-BRUNA) 1500 (600 Ca) MG tablet Take 1,530 mg by mouth 2 times daily (with meals)       fish oil-omega-3 fatty acids 500 MG capsule Take 1,500 mg by mouth daily        gabapentin (NEURONTIN) 100 MG capsule Take 100 mg by mouth 2 times daily Taking prn       hydrocortisone 2.5 % cream Apply topically 2 times daily as needed For vaginal dryness        Lactobacillus Acid-Pectin (LACTOBACILLUS ACIDOPHILUS) TABS Take 1 tablet by mouth 3 times daily (before meals)       lamoTRIgine (LAMICTAL) 100 MG tablet Take 300 mg by mouth daily       loratadine (CLARITIN) 10 MG tablet Take 10 mg by mouth daily       LORazepam (ATIVAN) 0.5 MG tablet Take 0.5 mg by mouth nightly as needed for anxiety       Nutritional Supplements (ADULT NUTRITIONAL SUPPLEMENT + OR) DIM 900mg daily       Nutritional Supplements (ADULT NUTRITIONAL SUPPLEMENT + OR) Take by mouth 2 times daily Tryphylla 12,000mg  daily        Nutritional Supplements (ADULT NUTRITIONAL SUPPLEMENT + OR) Take by mouth daily Calm powder- magnesium/calcium supplement        ondansetron (ZOFRAN) 8 MG tablet Take 8 mg by mouth       prazosin (MINIPRESS) 1 MG capsule Take 1 mg by mouth 3 times daily       prochlorperazine (COMPAZINE) 10 MG tablet Take 10 mg by mouth every 6 hours as needed for nausea or vomiting       vitamin D3 (CHOLECALCIFEROL) 250 mcg (89797 units) capsule Take 1 capsule by mouth daily         Herbal Medication/Nutritional Supplements: DIM (diindolylmethane), Triphala (Phyllanthus emblica, Terminalia chebula, Terminalia bellerica), Calm powder (magnesium/calcium), calcium, vitamin D, Fish oil, lactobacillus probiotic    Nausea/Vomiting, Pain, or other issues: Prochlorperazine has been ineffective in the past.  Experiences significant n/v from codeine, hydrocodone, morphine.  She has tolerated tramadol in the past.    Summary:  I met with Mil Seals for approximately 45 minutes.  We discussed allergies, home medications, collections (GCSF +/- cyclophosphamide), chemotherapy prep (melphalan), anti-infectives (acyclovir, fluconazole, levofloxacin, Bactrim), briefly vaccines, and discharge medication process.     Kian Rodgers, PharmD       Again, thank you for allowing me to participate in the care of your patient.        Sincerely,        BMT Pharm D, [facility]

## 2021-09-23 NOTE — LETTER
9/23/2021         RE: Mil Seasl  [address]  [facility] [address]        Dear Colleague,    Thank you for referring your patient, Mil Seals, to the [facility] BLOOD AND MARROW TRANSPLANT PROGRAM [place]. Please see a copy of my visit note below.    .BMT Teaching Flowsheet    Mil Seals is a 61 year old female  There were no encounter diagnoses.    Teaching Topic: 2016-35    Person(s) involved in teaching: Patient, friend Lori   Motivation Level  Asks Questions: Yes  Eager to Learn: Yes  Cooperative: Yes  Receptive (willing/able to accept information): Yes  Any cultural factors/[place] beliefs that may influence understanding or compliance? No    Patient and Caregiver demonstrates understanding of the following:  - Reason for the appointment, diagnosis and treatment plan: Yes  - Knowledge of proper use of medications and conditions for which they are ordered (with special attention to potential side effects or drug interactions): Yes  - Which situations necessitate calling provider and whom to contact: Yes    Teaching concerns addressed: Teaching about avoiding cleaning the litterbox, talking with pharmacist regarding medication side effects, and contact information.     Proper use and care of (medical equipment, care aids, etc.) Yes  Pain management techniques: Yes  Patient instructed on hand hygiene: Yes  How and/when to access community resources: Yes    Infection Control:  Patient and Caregiver demonstrates understanding of the following:  Surgical procedure site care taught NA  Signs and symptoms of infection taught Yes  Wound care taught NA  Central venous catheter care taught Yes    Instructional Materials Used/Given:   Pt Auto BMT binder.    Research participant Mil Seals was provided the information on the Research Participant Information Sheet by Karen Drummond RN on September 23, 2021. This document contains information regarding the risks of  participating in a research study during the COVID-19 pandemic. Receipt of the information sheet was confirmed by study personnel prior to the visit.    Time spent with patient: 95 minutes.      Specific Concerns: Yes      Again, thank you for allowing me to participate in the care of your patient.        Sincerely,        BMT Nurse Coordinator

## 2021-09-23 NOTE — PROGRESS NOTES
"CLINICAL SOCIAL WORK   PSYCHOSOCIAL ASSESSMENT  BLOOD AND MARROW TRANSPLANT SERVICE      Assessment completed on September 23, 2021 of living situation, support system, financial status, functional status, coping, stressors, need for resources and social work intervention provided as needed.  Information for this assessment was provided by Pt and friend report in addition to medical chart review and consultation with medical team.     Present at assessment: Patient, Mil Seals and Lindy \"Lori\"Nelson were present for this telephone assessment conducted by Michelle Fraser, [facility] .     Diagnosis: Multiple Myeloma (MM)    Date of Diagnosis: 4/22/2021    Transplant type: Autologous    Donor: Autologous     Physician: Yohan Powers MD    Nurse Coordinator: Faith Simms RN    : JEMIMA Lyn, [facility]     Permanent Address:   [address] [address]    Local Address:   [place] [address]    Contact Information:  Pt Home Phone: [phone]- does not answer this #  Pt Cell Phone: [phone]  Pt Email: [email or web address]  Pt's Lori MarvinQueenie Phone: [phone]      Presenting Information:  Mil is a 61 year old female diagnosed with MM who presents for evaluation for an autologous transplant at the [facility] ([facility]  Pt was accompanied to today's telephone visit by her friend Lori.     Decision Making:   Self     Health Care Directive:   Will bring in copy. Mil has a completed document and they will bring in the copy for scanning.     Relationship Status:   Single     Special Lodging Needs: Local Lodging Needed. Mil will be [facility].    Family/Support System: Pt endorsed a good support system including family and close friends who will be available to support Pt throughout transplant process.     Spouse: n/a    Children: Pelon Lara, " Val Kelly- all live in [place]    Grandchildren: not discussed    Parents: Mother Amelia Valdivia- lives in [place]. Father is     Siblings: 1 adopted brother and 1 1/2 brother    Friends: Lori and Pam Damon    Caregiver: TYRONE discussed with pt the caregiver role and expectation at length. Pt is agreeable to having a full time caregiver for a minimum of 30 days until cleared by the BMT physician. Pt's identified caregivers is Pam Damon. Caregiver education and resources provided. No caregiver concerns identified. Pt and Lori confirmed understanding caregiving requirement, including driving restrictions, as discussed during psychosocial assessment.     Name & Numbers  Pam Damon [phone]    Transportation Mode:  Private Car . Pt is aware of driving restrictions post-BMT and the need for the caregiver is to drive until cleared to drive by the  BMT physician. SW provided information on parking info and monthly parking pass options. Pt will utilize the [employer] security shuttle for transportation to and from the [facility] and BMT Clinic/Hospital.    Insurance:  No Insurance issues identified.  Pt denied specific insurance concerns at this time. TYRONE reiterated information about the BMT Financial  should specific insurance questions arise as Pt moves through transplant process.     Mil does report that she has coverage through her insurance for lodging, transport, and food support, but reports that she has been getting mixed information on what this benefit will cover. TYRONE agreed to reach out and ask them to send an explanation of benefits for the pt to have.    Sources of Income:  Income concerns identified  Mil has no income at this time. She expressed lots of concerns regard making ends meet during the BMT process, especially if she needs to pay for her lodging up front. TYRONE encouraged Pt to contact this SW for additional potential resources should  "financial situation change.     TYRONE provided the pt the [employer] application. She has also requested to do the BM[facility] and [facility] qian once she is admitted.     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  GAD7 assessment, score was 13, which indicates moderate signs of anxiety.    Mil shared she has been diagnosed with a mood disorder, anxiety and PTSD. She is currently taking Lamictal and Prazosin to help her mental health, she was also taking supplements including Joey's wart, omega-3 and d-3 as ways to help her anxiety. She had to stop these supplements due to the interaction of her chemotherapy. Mil has not been working with a therapist currently, but has in the past, she has struggled a bit find a new therapist that she connects with. Pt is open to working with a therapist again if she can find one. Mil shared that her past therapist did EMDR with her, but does not feel the relationship was positive for her. She reports a prior work supervisor who was trained in Somatic therapy and she felt this was more of the therapeutic style that she would appreciate.      Mil has also been working with a psychiatrist [facility] in [place], [place] who has been making medication recommendations for her and then her PCP has been officially prescribing the medications. Mil notes they did change her medication dose at her diagnosis, but Mil would be open to having these reassessed. We discussed looking for a local psychiatrist and she was open to this. TYRONE discussed this with the pt's BMT medical team who will place a referral for a psychiatrist visit.     Mil shares that her mental health has been good and bad, overall she feels she \"has not been present due to her " "grief\" she is not completely ready to explore this grief currently, but does feel this is a big impact for her quality of life. Mil describes herself as a introvert and find being home much easier for her, but she misses connection with family (impacted due to COVID) and feeling down by life a lot. She reports her mental health was significantly impacted during her chemo and found her anxiety much higher during this time. Mil shares that she does not reach out of she is needing support and tends to just live in her feeling which she knows is not a positive place for her.    Mil does report she tries to utilize a few established supports when she is feeling more anxiety including talking to family/friends, sleep, positive self-talk and getting outside.     We talked about how some patients may see an increase in feelings of anxiety or depression while hospitalized for extended periods along with isolation. Encouraged Mil and Lori to let us know if they are noticing an increase in symptoms. We talked about the variety of modalities available to use as coping mechanisms (including but not limited to guided imagery, relaxation techniques, progressive muscle relaxation, counseling/talk therapy and medication).    Chemical Use: No issues identified.  Mil denies the use of tobacco alcohol, marijuana or other drugs. Based on the information provided, there appear to be no specific risks or concerns identified at this time.     Trauma/Loss/Abuse History: Multiple losses associated with cancer diagnosis and treatment, including health, employment, changes to physical appearance, etc.     Spirituality:  Patient identifies with cosmo community. Mil describes her cosmo as a collection of things and find nature a source of connection for her. Mil is open to a blessing ceremony.     Coping: Pt noted that she is currently feeling \"anxious, nervous, ready to begin and scared\".  Pt shared that her main " coping mechanisms are talking with friend/family and being outdoors.  Pt noted that she also tobin by positive self-talk. SW and Pt discussed additional positive coping mechanisms that Pt can utilize while in the hospital.     Caregiver Coping: caregiver not present for assessment     Education Provided: Transplant process expectations, Caregiver requirements, Caregiver self-care, Financial issues related to transplant, Financial resources/grants available, Common psychosocial stressors pre/post transplant, Support group(s) available, Tour/layout of the inpatient unit/non-use of cell phones, Hospital resources available, Web site information, Resources for transplant patients and their families as well as the Clinical Social Work role.     Interventions Provided: Supportive counseling and education     Recreation/Leisure Activities:  Mil shared she enjoys being with family/friends, watching TV and working on [employer]    Plans for Hospital Stay Leisure:  While IP Mil plans to watch TV.     Assessment and Recommendations for Team:  Pt is a 61 year old female diagnosed with MM who is here undergoing preparation for a planned autologous transplant.     Pt is a pleasant and articulate female who feels comfortable communicating with the medical team. Pt has a limited, but strong support team who are involved.     Pt may benefit from ongoing psychosocial support in regards to coping with the adjustment to the BMT process. Pt's family may benefit from ongoing psychosocial support in regards to coping with the adjustment to the BMT process and may also benefit from attending the BMT Caregiver Support Group that meets weekly on the inpatient unit.     Pt has a good support system and a good caregiver plan. Pt verbalizes understanding of the transplant process and wanting to proceed. SW provided contact information and encouraged Pt to contact SW with questions, concerns, resources and for support. Per this assessment, I  did not identify any barriers to this patient moving forward with transplant      Important Information:   - Mil would like to see psychiatry prior to starting-referral placed  - Mil is open to having a blessing ceremony  -Mil would like qian information  - Mil would like to establish with a new therapist  - Mil would like to check in with the dietician to discuss her intake.     Follow up Planned:   Initiate financial resources  Psychosocial support  Lodging referrals  Spiritual Health referral    JEMIMA Lyn, [facility] Care[facility]  [facility]  Pager: [phone]  Phone: [phone]

## 2021-09-23 NOTE — PROGRESS NOTES
.BMT Teaching Flowsheet    Mil Seals is a 61 year old female  There were no encounter diagnoses.    Teaching Topic: 2016-35    Person(s) involved in teaching: Patient, friend Lori   Motivation Level  Asks Questions: Yes  Eager to Learn: Yes  Cooperative: Yes  Receptive (willing/able to accept information): Yes  Any cultural factors/Roman Catholic beliefs that may influence understanding or compliance? No    Patient and Caregiver demonstrates understanding of the following:  - Reason for the appointment, diagnosis and treatment plan: Yes  - Knowledge of proper use of medications and conditions for which they are ordered (with special attention to potential side effects or drug interactions): Yes  - Which situations necessitate calling provider and whom to contact: Yes    Teaching concerns addressed: Teaching about avoiding cleaning the litterbox, talking with pharmacist regarding medication side effects, and contact information.     Proper use and care of (medical equipment, care aids, etc.) Yes  Pain management techniques: Yes  Patient instructed on hand hygiene: Yes  How and/when to access community resources: Yes    Infection Control:  Patient and Caregiver demonstrates understanding of the following:  Surgical procedure site care taught NA  Signs and symptoms of infection taught Yes  Wound care taught NA  Central venous catheter care taught Yes    Instructional Materials Used/Given:   Pt Auto BMT binder.    Research participant Mil Seals was provided the information on the Research Participant Information Sheet by Karen Drummond RN on September 23, 2021. This document contains information regarding the risks of participating in a research study during the COVID-19 pandemic. Receipt of the information sheet was confirmed by study personnel prior to the visit.    Time spent with patient: 95 minutes.      Specific Concerns: Yes

## 2021-09-23 NOTE — PROGRESS NOTES
"Pharmacy Assessment - Pre-Stem Cell Transplant    Assessments & Recommendations:  1) Sulfa intolerance - rash (she recalls ~40yrs ago).  She is willing to rechallenge Bactrim and monitor for rash.  2) PCN intolerance - feels ill, nausea/vomitting.  Cefepime will be ok to use if needed for febrile neutropenia.   3) Recommend to hold nutritional supplements until recovered from melphalan.  She expressed that she wants to continue probiotics and triphala supplement which help for IBS.  Explained why we hold probiotics when gut is friable after transplant.  Triphala (Phyllanthus emblica, Terminalia chebula, Terminalia bellerica) should be held as it has potential liver toxicity and may inhibit platelet function.   4) Ice oral cryotherapy for 2-4 hours around melphalan.     If this patient is admitted under observation, the patient may bring in their own supply of the following medication for use in the hospital:  1) albuterol inhaler  2) Symbicort inhaler  -Per \"Medications Not Supplied by Pharmacy\" policy (available on [employer])    History of Present Illness:  Mil Seals is a 61 year old year old female diagnosed with multiple myeloma.  She has been treated with daratumumab/Velcade/Revlimid/dexamethasone.  She is now being work up for autologous Stem Cell Transplant on protocol 2016-35, which utilizes melphalan as a conditioning regimen.    Pertinent labs/tests:  Viral Testing:  HSV(+) / EBV(+)  Ejection Fraction: 60-65% (9/22/21)  QTc: 447msec (9/22/21)    Weights:   Wt Readings from Last 3 Encounters:   09/22/21 74.3 kg (163 lb 12.8 oz)   09/22/21 74.3 kg (163 lb 11.2 oz)   09/22/21 74.3 kg (163 lb 11.2 oz)   Ideal body weight: 61.7 kg (136 lb 0.7 oz)  Adjusted ideal body weight: 66.7 kg (147 lb 2.3 oz)  % IBW:  120%  There is no height or weight on file to calculate BMI.    Primary BMT Physician: Dr Joyner  BMT RN Coordinator:  Karen Drummond    Past Medical History:  No past medical history on " file.    Medication Allergies:  Allergies   Allergen Reactions     Azithromycin Nausea     Codeine      Hydrocodone Nausea and Vomiting     Morphine Nausea and Vomiting     Mushroom      fungus     Penicillins      As child. Nausea, feels ill.      Sulfa Drugs Rash     Long time ago       Current Medications (pre-admit):  Current Outpatient Medications   Medication Sig Dispense Refill     albuterol (PROAIR HFA/PROVENTIL HFA/VENTOLIN HFA) 108 (90 Base) MCG/ACT inhaler Inhale 2 puffs into the lungs every 4 hours as needed for shortness of breath / dyspnea or wheezing       Ascorbic Acid (VITAMIN C) 500 MG CHEW Take by mouth daily        budesonide-formoterol (SYMBICORT) 160-4.5 MCG/ACT Inhaler Inhale 2 puffs into the lungs 2 times daily As needed       calcium carbonate (OS-BRUNA) 1500 (600 Ca) MG tablet Take 1,530 mg by mouth 2 times daily (with meals)       fish oil-omega-3 fatty acids 500 MG capsule Take 1,500 mg by mouth daily        gabapentin (NEURONTIN) 100 MG capsule Take 100 mg by mouth 2 times daily Taking prn       hydrocortisone 2.5 % cream Apply topically 2 times daily as needed For vaginal dryness        Lactobacillus Acid-Pectin (LACTOBACILLUS ACIDOPHILUS) TABS Take 1 tablet by mouth 3 times daily (before meals)       lamoTRIgine (LAMICTAL) 100 MG tablet Take 300 mg by mouth daily       loratadine (CLARITIN) 10 MG tablet Take 10 mg by mouth daily       LORazepam (ATIVAN) 0.5 MG tablet Take 0.5 mg by mouth nightly as needed for anxiety       Nutritional Supplements (ADULT NUTRITIONAL SUPPLEMENT + OR) DIM 900mg daily       Nutritional Supplements (ADULT NUTRITIONAL SUPPLEMENT + OR) Take by mouth 2 times daily Tryphylla 12,000mg daily        Nutritional Supplements (ADULT NUTRITIONAL SUPPLEMENT + OR) Take by mouth daily Calm powder- magnesium/calcium supplement        ondansetron (ZOFRAN) 8 MG tablet Take 8 mg by mouth       prazosin (MINIPRESS) 1 MG capsule Take 1 mg by mouth 3 times daily        prochlorperazine (COMPAZINE) 10 MG tablet Take 10 mg by mouth every 6 hours as needed for nausea or vomiting       vitamin D3 (CHOLECALCIFEROL) 250 mcg (64533 units) capsule Take 1 capsule by mouth daily         Herbal Medication/Nutritional Supplements: DIM (diindolylmethane), Triphala (Phyllanthus emblica, Terminalia chebula, Terminalia bellerica), Calm powder (magnesium/calcium), calcium, vitamin D, Fish oil, lactobacillus probiotic    Nausea/Vomiting, Pain, or other issues: Prochlorperazine has been ineffective in the past.  Experiences significant n/v from codeine, hydrocodone, morphine.  She has tolerated tramadol in the past.    Summary:  I met with Mil Seals for approximately 45 minutes.  We discussed allergies, home medications, collections (GCSF +/- cyclophosphamide), chemotherapy prep (melphalan), anti-infectives (acyclovir, fluconazole, levofloxacin, Bactrim), briefly vaccines, and discharge medication process.     Kian Rodgers, PharmD

## 2021-09-23 NOTE — LETTER
"    9/23/2021         RE: Mil Seals  [address]  [facility] [address]        Dear Colleague,    Thank you for referring your patient, Mil Seals, to the [facility] BLOOD AND MARROW TRANSPLANT PROGRAM [place]. Please see a copy of my visit note below.    CLINICAL SOCIAL WORK   PSYCHOSOCIAL ASSESSMENT  BLOOD AND MARROW TRANSPLANT SERVICE      Assessment completed on September 23, 2021 of living situation, support system, financial status, functional status, coping, stressors, need for resources and social work intervention provided as needed.  Information for this assessment was provided by Pt and friend report in addition to medical chart review and consultation with medical team.     Present at assessment: Patient, Mil Seals and Lindy \"Lori\" Nelson were present for this telephone assessment conducted by Michelle [facility] .     Diagnosis: Multiple Myeloma (MM)    Date of Diagnosis: 4/22/2021    Transplant type: Autologous    Donor: Autologous     Physician: Yohan Powers MD    Nurse Coordinator: Faith Simms RN    : JEMIMA Lyn, [facility]     Permanent Address:   [address]  [facility] [address]    Local Address:   [place] [address]    Contact Information:  Pt Home Phone: [phone]- does not answer this #  Pt Cell Phone: [phone]  Pt Email: [email or web address]  Pt's Lori Damon Phone: [phone]      Presenting Information:  Mil is a 61 year old female diagnosed with MM who presents for evaluation for an autologous transplant at the [facility] ([facility]).  Pt was accompanied to today's telephone visit by her friend Lori.     Decision Making:   Self     Health Care Directive:   Will bring in copy. Mil has a completed document and they will bring in the copy for scanning.     Relationship Status:   Single     Special " Lodging Needs: Local Lodging Needed. Mil will be staying [facility].    Family/Support System: Pt endorsed a good support system including family and close friends who will be available to support Pt throughout transplant process.     Spouse: n/a    Children: Pelon Lara, Robin, Val- all live in [place]    Grandchildren: not discussed    Parents: Mother Amelia Valdivia- lives in [place]. Father is     Siblings: 1 adopted brother and 1 1/2 brother    Friends: Lori and Pam Damon    Caregiver: TYRONE discussed with pt the caregiver role and expectation at length. Pt is agreeable to having a full time caregiver for a minimum of 30 days until cleared by the BMT physician. Pt's identified caregivers is Pam Damon. Caregiver education and resources provided. No caregiver concerns identified. Pt and Lori confirmed understanding caregiving requirement, including driving restrictions, as discussed during psychosocial assessment.     Name & Numbers  Pam Damon [phone]    Transportation Mode:  Private Car . Pt is aware of driving restrictions post-BMT and the need for the caregiver is to drive until cleared to drive by the  BMT physician. SW provided information on parking info and monthly parking pass options. Pt will utilize the hospital security shuttle for transportation to and from the [facility] and BMT Clinic/Hospital.    Insurance:  No Insurance issues identified.  Pt denied specific insurance concerns at this time. TYRONE reiterated information about the BMT Financial  should specific insurance questions arise as Pt moves through transplant process.     Mil does report that she has coverage through her insurance for lodging, transport, and food support, but reports that she has been getting mixed information on what this benefit will cover. TYRONE agreed to reach out and ask them to send an explanation of benefits for the pt to have.    Sources of  Income:  Income concerns identified  Mil has no income at this time. She expressed lots of concerns regard making ends meet during the BMT process, especially if she needs to pay for her lodging up front. SW encouraged Pt to contact this SW for additional potential resources should financial situation change.     TYRONE provided the pt the [employer] application. She has also requested to do the BMF and [facility] qian once she is admitted.     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  GAD7 assessment, score was 13, which indicates moderate signs of anxiety.    Mil shared she has been diagnosed with a mood disorder, anxiety and PTSD. She is currently taking Lamictal and Prazosin to help her mental health, she was also taking supplements including Joey's wart, omega-3 and d-3 as ways to help her anxiety. She had to stop these supplements due to the interaction of her chemotherapy. Mil has not been working with a therapist currently, but has in the past, she has struggled a bit find a new therapist that she connects with. Pt is open to working with a therapist again if she can find one. Mil shared that her past therapist did EMDR with her, but does not feel the relationship was positive for her. She reports a prior work supervisor who was trained in Somatic therapy and she felt this was more of the therapeutic style that she would appreciate.      Mil has also been working with a psychiatrist [facility] in [place], [place] who has been making medication recommendations for her and then her PCP has been officially prescribing the medications. Mil notes they did change her medication dose at her diagnosis, but Mil would be open to having these reassessed. We  "discussed looking for a local psychiatrist and she was open to this. SW discussed this with the pt's BMT medical team who will place a referral for a psychiatrist visit.     Mil shares that her mental health has been good and bad, overall she feels she \"has not been present due to her grief\" she is not completely ready to explore this grief currently, but does feel this is a big impact for her quality of life. Mil describes herself as a introvert and find being home much easier for her, but she misses connection with family (impacted due to COVID) and feeling down by life a lot. She reports her mental health was significantly impacted during her chemo and found her anxiety much higher during this time. Mil shares that she does not reach out of she is needing support and tends to just live in her feeling which she knows is not a positive place for her.    Mil does report she tries to utilize a few established supports when she is feeling more anxiety including talking to family/friends, sleep, positive self-talk and getting outside.     We talked about how some patients may see an increase in feelings of anxiety or depression while hospitalized for extended periods along with isolation. Encouraged Mil and Lori to let us know if they are noticing an increase in symptoms. We talked about the variety of modalities available to use as coping mechanisms (including but not limited to guided imagery, relaxation techniques, progressive muscle relaxation, counseling/talk therapy and medication).    Chemical Use: No issues identified.  Mil denies the use of tobacco alcohol, marijuana or other drugs. Based on the information provided, there appear to be no specific risks or concerns identified at this time.     Trauma/Loss/Abuse History: Multiple losses associated with cancer diagnosis and treatment, including health, employment, changes to physical appearance, etc.     Spirituality:  Patient identifies " "with cosmo community. Mil describes her cosmo as a collection of things and find nature a source of connection for her. Mil is open to a blessing ceremony.     Coping: Pt noted that she is currently feeling \"anxious, nervous, ready to begin and scared\".  Pt shared that her main coping mechanisms are talking with friend/family and being outdoors.  Pt noted that she also tobin by positive self-talk. SW and Pt discussed additional positive coping mechanisms that Pt can utilize while in the hospital.     Caregiver Coping: caregiver not present for assessment     Education Provided: Transplant process expectations, Caregiver requirements, Caregiver self-care, Financial issues related to transplant, Financial resources/grants available, Common psychosocial stressors pre/post transplant, Support group(s) available, Tour/layout of the inpatient unit/non-use of cell phones, Hospital resources available, Web site information, Resources for transplant patients and their families as well as the Clinical Social Work role.     Interventions Provided: Supportive counseling and education     Recreation/Leisure Activities:  Mil shared she enjoys being with family/friends, watching TV and working on [employer]    Plans for Hospital Stay Leisure:  While IP Mil plans to watch TV.     Assessment and Recommendations for Team:  Pt is a 61 year old female diagnosed with MM who is here undergoing preparation for a planned autologous transplant.     Pt is a pleasant and articulate female who feels comfortable communicating with the medical team. Pt has a limited, but strong support team who are involved.     Pt may benefit from ongoing psychosocial support in regards to coping with the adjustment to the BMT process. Pt's family may benefit from ongoing psychosocial support in regards to coping with the adjustment to the BMT process and may also benefit from attending the BMT Caregiver Support Group that meets weekly on the " inpatient unit.     Pt has a good support system and a good caregiver plan. Pt verbalizes understanding of the transplant process and wanting to proceed. SW provided contact information and encouraged Pt to contact SW with questions, concerns, resources and for support. Per this assessment, I did not identify any barriers to this patient moving forward with transplant      Important Information:   - Mil would like to see psychiatry prior to starting-referral placed  - Mil is open to having a blessing ceremony  -Mil would like qian information  - Mil would like to establish with a new therapist  - Mil would like to check in with the dietician to discuss her intake.     Follow up Planned:   Initiate financial resources  Psychosocial support  Lodging referrals  Spiritual Health referral    JEMIMA Lyn, LICTYRONE  [facility]  Pager: [phone]  Phone: [phone]                  Again, thank you for allowing me to participate in the care of your patient.        Sincerely,        TRENTON Glover

## 2021-09-23 NOTE — PROGRESS NOTES
[facility]    HPI and Interval History: 60 yo F, MM, here for close prior to auto, G1 intermittent neuropathy and night sweats almost every night, 10-point ROS otherwise negative.      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 RT to sternum/rib 3990Gy 7/20/20-8/7/20, [place]  2. Janey/VRd since 5/11/21 for ~5 cycles, CR, last cycle finished 9/7 (C6D15)    PMH: Asthma, PTSD  Allergy: sulfa (remote; willing to try again), pcn (n/v; ok to try cefepime in needed)    Current Outpatient Medications   Medication     albuterol (PROAIR HFA/PROVENTIL HFA/VENTOLIN HFA) 108 (90 Base) MCG/ACT inhaler     Ascorbic Acid (VITAMIN C) 500 MG CHEW     budesonide-formoterol (SYMBICORT) 160-4.5 MCG/ACT Inhaler     calcium carbonate (OS-BRUNA) 1500 (600 Ca) MG tablet     fish oil-omega-3 fatty acids 500 MG capsule     gabapentin (NEURONTIN) 100 MG capsule     hydrocortisone 2.5 % cream     Lactobacillus Acid-Pectin (LACTOBACILLUS ACIDOPHILUS) TABS     lamoTRIgine (LAMICTAL) 100 MG tablet     loratadine (CLARITIN) 10 MG tablet     LORazepam (ATIVAN) 0.5 MG tablet     Nutritional Supplements (ADULT NUTRITIONAL SUPPLEMENT + OR)     Nutritional Supplements (ADULT NUTRITIONAL SUPPLEMENT + OR)     Nutritional Supplements (ADULT NUTRITIONAL SUPPLEMENT + OR)     ondansetron (ZOFRAN) 8 MG tablet     prazosin (MINIPRESS) 1 MG capsule     prochlorperazine (COMPAZINE) 10 MG tablet     vitamin D3 (CHOLECALCIFEROL) 250 mcg (18021 units) capsule     No current facility-administered medications for this visit.     Ph/E:   /76, HR 71, O2 sat 96%  General: NAD; H&N: no mucosal lesions; Lungs clear; Heart RRR; Abdomen; Soft, No organomegaly; Extremities: No edema; Skin: No rash; Neuro: Nonfocal; Mood/Affect: appropriate    A&P:   1. 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 Blood markers show 2 monoclonal kappa light chain and 1 IgG lambda, with a M spike of zero. This without a doubt is good enough for Lori auto. SEs were discussed including n/v/d, infection, bleeding, organ toxicity and 1-2% chance of death. Goal is prolongation of remission by some years. Cards clearance obtained. Refer to psych per her request to help manage PTSD/anxiety, PET shows a new focal uptake in thyroid which we will evaluate by an US and TFTs. Will collect for 2 autos and will proceed with G mob once thyroid US shows encouraging results.       BMT and Cell Therapy Informed Consent Discussion     In today's visit, we discussed in detail the research for which Mil Seals is eligible. We discussed the potential risks and potential benefits of each protocol individually. We explained potential alternatives to the protocols discussed. We explained to the patient that participation is voluntary and that consent may be withdrawn at any time.     We discussed:    The rationale for our approach to the disease treatment    The eligibility requirements for treatment in the context of clinical trials    The need for caregiver support and the caregiver's role in recovery    The importance of adherence to the treatment plan and appropriate follow up    The requirements for contraception while undergoing treatment    The potential risks of morbidity and mortality related to this treatment    The requirements for supportive care to reduce the risk of infection and other complications    The role of the dietician and PT/OT to reduce the risk of muscle loss/sarcopenia    Support that is available through our social workers and care team to mitigate distress    The desired outcomes/goals of treatment, including the possibility of long-term disease control    The patient  completed the last round of treatment on 9/7/21.    HCT-CI score: 4 (FEV1%pred, depression). We counseled the patient about the impact of this on the risk of treatment related and overall mortality. The score fit within treatment protocol eligibility criteria.    Karnofsky performance score: 80%      Active infections:  none.  Prior infections that require additional special prophylaxis considerations: none.  I reviewed and discussed infectious disease evaluation with the patient and the management plan during treatment.    Reproductive status: What methods of birth control does the patient plan to use during the treatment period beginning with conditioning and ending with the discontinuation of immune suppression (indicate with an X all that apply):  x__ The patient is confirmed to be sterile or post-menopausal  __ Sexual abstinence  __ Condoms  __ Implants  __ Injectables  __ Oral contraceptives  __ Intrauterine devices (IUD)  __ Other (describe)    The patient received appropriate reproductive counseling and agreed with the need for effective contraception during the treatment procedures.      Dental health suitable to proceed: Yes      Transplants for multiple myeloma:  The patient has high-risk myeloma but because of her young age we will collect for a goal of 8 million CD34/kg for 2 autos. The day for admission to begin melphalan conditioning is Day -1 for melphalan 200 mg/m2 (not frail, creatinine clearance 30+). .    After our detailed discussion above, the patient signed the following consents for treatment and protocols:    Lori auto    Ricki Joyner    Addendum: thyroid US with multiple nodules and 1 among them that meets criteria for Bx. Since most thyroid cancers that arise in the context of multinodular thyroid are slow growing and this nodule was diagnosed incidentally, will proceed with transplant and biopsy this at count recovery, day 28 auto. I will notify the patient by phone and notified her BMT  coordinator by email to schedule the biopsy.

## 2021-09-24 ENCOUNTER — OFFICE VISIT (OUTPATIENT)
Dept: TRANSPLANT | Facility: CLINIC | Age: 61
End: 2021-09-24
Attending: INTERNAL MEDICINE
Payer: COMMERCIAL

## 2021-09-24 ENCOUNTER — MEDICAL CORRESPONDENCE (OUTPATIENT)
Dept: TRANSPLANT | Facility: CLINIC | Age: 61
End: 2021-09-24

## 2021-09-24 ENCOUNTER — APPOINTMENT (OUTPATIENT)
Dept: LAB | Facility: CLINIC | Age: 61
End: 2021-09-24
Attending: INTERNAL MEDICINE
Payer: COMMERCIAL

## 2021-09-24 ENCOUNTER — ANCILLARY PROCEDURE (OUTPATIENT)
Dept: ULTRASOUND IMAGING | Facility: CLINIC | Age: 61
End: 2021-09-24
Attending: INTERNAL MEDICINE
Payer: COMMERCIAL

## 2021-09-24 ENCOUNTER — TELEPHONE (OUTPATIENT)
Dept: TRANSPLANT | Facility: CLINIC | Age: 61
End: 2021-09-24

## 2021-09-24 ENCOUNTER — OFFICE VISIT (OUTPATIENT)
Dept: CARDIOLOGY | Facility: CLINIC | Age: 61
End: 2021-09-24
Attending: STUDENT IN AN ORGANIZED HEALTH CARE EDUCATION/TRAINING PROGRAM
Payer: COMMERCIAL

## 2021-09-24 VITALS
OXYGEN SATURATION: 96 % | HEART RATE: 71 BPM | BODY MASS INDEX: 25.49 KG/M2 | WEIGHT: 163 LBS | SYSTOLIC BLOOD PRESSURE: 114 MMHG | DIASTOLIC BLOOD PRESSURE: 76 MMHG

## 2021-09-24 DIAGNOSIS — Z51.81 ENCOUNTER FOR MONITORING CARDIOTOXIC DRUG THERAPY: Primary | ICD-10-CM

## 2021-09-24 DIAGNOSIS — C90.00 MULTIPLE MYELOMA, REMISSION STATUS UNSPECIFIED (H): ICD-10-CM

## 2021-09-24 DIAGNOSIS — J45.909 MODERATE ASTHMA WITHOUT COMPLICATION, UNSPECIFIED WHETHER PERSISTENT: ICD-10-CM

## 2021-09-24 DIAGNOSIS — Z11.59 ENCOUNTER FOR SCREENING FOR OTHER VIRAL DISEASES: ICD-10-CM

## 2021-09-24 DIAGNOSIS — Z86.2 PERSONAL HISTORY OF DISEASES OF BLOOD AND BLOOD-FORMING ORGANS: ICD-10-CM

## 2021-09-24 DIAGNOSIS — C90.00 MULTIPLE MYELOMA, REMISSION STATUS UNSPECIFIED (H): Primary | ICD-10-CM

## 2021-09-24 DIAGNOSIS — Z79.899 ENCOUNTER FOR MONITORING CARDIOTOXIC DRUG THERAPY: Primary | ICD-10-CM

## 2021-09-24 LAB
PATH REPORT.COMMENTS IMP SPEC: NORMAL
PATH REPORT.COMMENTS IMP SPEC: NORMAL
PATH REPORT.FINAL DX SPEC: NORMAL
PATH REPORT.GROSS SPEC: NORMAL
PATH REPORT.MICROSCOPIC SPEC OTHER STN: NORMAL
PATH REPORT.MICROSCOPIC SPEC OTHER STN: NORMAL
PATH REPORT.RELEVANT HX SPEC: NORMAL
T3FREE SERPL-MCNC: 2.4 PG/ML (ref 2.3–4.2)
TSH SERPL DL<=0.005 MIU/L-ACNC: 2.79 MU/L (ref 0.4–4)

## 2021-09-24 PROCEDURE — G0463 HOSPITAL OUTPT CLINIC VISIT: HCPCS

## 2021-09-24 PROCEDURE — 99204 OFFICE O/P NEW MOD 45 MIN: CPT | Performed by: STUDENT IN AN ORGANIZED HEALTH CARE EDUCATION/TRAINING PROGRAM

## 2021-09-24 PROCEDURE — 99205 OFFICE O/P NEW HI 60 MIN: CPT | Mod: 25

## 2021-09-24 PROCEDURE — 84481 FREE ASSAY (FT-3): CPT

## 2021-09-24 PROCEDURE — 84443 ASSAY THYROID STIM HORMONE: CPT

## 2021-09-24 PROCEDURE — 36415 COLL VENOUS BLD VENIPUNCTURE: CPT

## 2021-09-24 PROCEDURE — 76536 US EXAM OF HEAD AND NECK: CPT | Mod: GC | Performed by: RADIOLOGY

## 2021-09-24 ASSESSMENT — PAIN SCALES - GENERAL: PAINLEVEL: NO PAIN (0)

## 2021-09-24 ASSESSMENT — ANXIETY QUESTIONNAIRES: GAD7 TOTAL SCORE: 13

## 2021-09-24 NOTE — LETTER
9/24/2021         RE: Mil Seals  [address]  [facility] [address]        Dear Colleague,    Thank you for referring your patient, Mil Seals, to the [facility] BLOOD AND MARROW TRANSPLANT PROGRAM [place]. Please see a copy of my visit note below.    [facility]MT [facility]    HPI and Interval History: 60 yo F, MM, here for close prior to auto, G1 intermittent neuropathy and night sweats almost every night, 10-point ROS otherwise negative.      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 RT to sternum/rib 3990Gy 7/20/20-8/7/20, [place]  2. Janey/VRd since 5/11/21 for ~5 cycles, CR, last cycle finished 9/7 (C6D15)    PMH: Asthma, PTSD  Allergy: sulfa (remote; willing to try again), pcn (n/v; ok to try cefepime in needed)    Current Outpatient Medications   Medication     albuterol (PROAIR HFA/PROVENTIL HFA/VENTOLIN HFA) 108 (90 Base) MCG/ACT inhaler     Ascorbic Acid (VITAMIN C) 500 MG CHEW     budesonide-formoterol (SYMBICORT) 160-4.5 MCG/ACT Inhaler     calcium carbonate (OS-BRUNA) 1500 (600 Ca) MG tablet     fish oil-omega-3 fatty acids 500 MG capsule     gabapentin (NEURONTIN) 100 MG capsule     hydrocortisone 2.5 % cream     Lactobacillus Acid-Pectin (LACTOBACILLUS ACIDOPHILUS) TABS     lamoTRIgine (LAMICTAL) 100 MG tablet     loratadine (CLARITIN) 10 MG tablet     LORazepam (ATIVAN) 0.5 MG tablet     Nutritional Supplements (ADULT NUTRITIONAL SUPPLEMENT + OR)     Nutritional Supplements (ADULT NUTRITIONAL SUPPLEMENT + OR)     Nutritional Supplements (ADULT NUTRITIONAL SUPPLEMENT + OR)     ondansetron (ZOFRAN) 8 MG tablet     prazosin (MINIPRESS) 1 MG capsule     prochlorperazine (COMPAZINE) 10 MG tablet     vitamin D3 (CHOLECALCIFEROL) 250 mcg (61784 units) capsule     No current facility-administered  medications for this visit.     Ph/E:   /76, HR 71, O2 sat 96%  General: NAD; H&N: no mucosal lesions; Lungs clear; Heart RRR; Abdomen; Soft, No organomegaly; Extremities: No edema; Skin: No rash; Neuro: Nonfocal; Mood/Affect: appropriate    A&P:   1. 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 Blood markers show 2 monoclonal kappa light chain and 1 IgG lambda, with a M spike of zero. This without a doubt is good enough for Olri auto. SEs were discussed including n/v/d, infection, bleeding, organ toxicity and 1-2% chance of death. Goal is prolongation of remission by some years. Cards clearance obtained. Refer to psych per her request to help manage PTSD/anxiety, PET shows a new focal uptake in thyroid which we will evaluate by an US and TFTs. Will collect for 2 autos and will proceed with G mob once thyroid US shows encouraging results.       BMT and Cell Therapy Informed Consent Discussion     In today's visit, we discussed in detail the research for which Mil Seals is eligible. We discussed the potential risks and potential benefits of each protocol individually. We explained potential alternatives to the protocols discussed. We explained to the patient that participation is voluntary and that consent may be withdrawn at any time.     We discussed:    The rationale for our approach to the disease treatment    The eligibility requirements for treatment in the context of clinical trials    The need for caregiver support and the caregiver's role in recovery    The importance of adherence to the treatment plan and appropriate follow up    The requirements for contraception while undergoing treatment    The potential risks of morbidity and mortality related to this treatment    The requirements for supportive care to reduce the risk of infection and other complications    The role  of the dietician and PT/OT to reduce the risk of muscle loss/sarcopenia    Support that is available through our social workers and care team to mitigate distress    The desired outcomes/goals of treatment, including the possibility of long-term disease control    The patient completed the last round of treatment on 9/7/21.    HCT-CI score: 4 (FEV1%pred, depression). We counseled the patient about the impact of this on the risk of treatment related and overall mortality. The score fit within treatment protocol eligibility criteria.    Karnofsky performance score: 80%      Active infections:  none.  Prior infections that require additional special prophylaxis considerations: none.  I reviewed and discussed infectious disease evaluation with the patient and the management plan during treatment.    Reproductive status: What methods of birth control does the patient plan to use during the treatment period beginning with conditioning and ending with the discontinuation of immune suppression (indicate with an X all that apply):  x__ The patient is confirmed to be sterile or post-menopausal  __ Sexual abstinence  __ Condoms  __ Implants  __ Injectables  __ Oral contraceptives  __ Intrauterine devices (IUD)  __ Other (describe)    The patient received appropriate reproductive counseling and agreed with the need for effective contraception during the treatment procedures.      Dental health suitable to proceed: Yes      Transplants for multiple myeloma:  The patient has high-risk myeloma but because of her young age we will collect for a goal of 8 million CD34/kg for 2 autos. The day for admission to begin melphalan conditioning is Day -1 for melphalan 200 mg/m2 (not frail, creatinine clearance 30+). .    After our detailed discussion above, the patient signed the following consents for treatment and protocols:    Lori auto    Ricki Joyner    Addendum: thyroid US with multiple nodules and 1 among them that meets criteria for  Bx. Since most thyroid cancers that arise in the context of multinodular thyroid are slow growing and this nodule was diagnosed incidentally, will proceed with transplant and biopsy this at count recovery, day 28 auto. I will notify the patient by phone and notified her BMT coordinator by email to schedule the biopsy.             Again, thank you for allowing me to participate in the care of your patient.        Sincerely,        BMT DOM

## 2021-09-24 NOTE — NURSING NOTE
Chief Complaint   Patient presents with     Blood Draw     Labs drawn via  by rn in lab.     Labs collected from venipuncture by RN. Patient declined having vitals taken in order to get to their next appointment on time.    Jerman Hyman RN

## 2021-09-24 NOTE — NURSING NOTE
"Oncology Rooming Note    September 24, 2021 3:24 PM   Mil Seals is a 61 year old female who presents for:    Chief Complaint   Patient presents with     Blood Draw     Labs drawn via  by rn in lab.     Oncology Clinic Visit     Multiple myeloma, remission status unspecified (H)     Initial Vitals: There were no vitals taken for this visit. Estimated body mass index is 25.49 kg/m  as calculated from the following:    Height as of 9/22/21: 1.703 m (5' 7.05\").    Weight as of an earlier encounter on 9/24/21: 73.9 kg (163 lb). There is no height or weight on file to calculate BSA.  Data Unavailable Comment: Data Unavailable   No LMP recorded. Patient is postmenopausal.  Allergies reviewed: Yes  Medications reviewed: Yes    Medications: Medication refills not needed today.  Pharmacy name entered into [employer]: [id number] - [address]    Clinical concerns: New concerns: Please discuss steps in care, dates of procedures and surgery. Patient's care giver not available until 10/11/21.        Angela Johnson LPN September 24, 2021 3:25 PM                "

## 2021-09-24 NOTE — PATIENT INSTRUCTIONS
"You were seen today in the Cardiovascular Clinic at the [facility].     Cardiology Providers you saw during your visit:Yamilka Rosas MD      Diagnosis:   Encounter Diagnoses   Name Primary?     Moderate asthma without complication, unspecified whether persistent      Encounter for monitoring cardiotoxic drug therapy Yes        Results: Discussed with you today  Your echocardiogram and ECG      Orders:   Orders Placed This Encounter   Procedures     Leadless EKG Monitor 3 to 7 Days     Echocardiogram Complete         Medications Discontinued:  There are no discontinued medications.      Recommendations:   1. We will do a zio patch to assess your palpitations  2. Repeat echocardiogram after your transplant (~3months)  3. Take aspirin when taking lenalidomide.     Follow up with Provider - Yamilka Rosas MD       Please feel free to call me with any questions or concerns.       Questions: [phone].   First press #1 for [employer] for \"Medical Questions\" to reach us Cardiology Nurses.     [phone].   First press #1 for the [employer] and then press #1     On Call Cardiologist for after hours or on weekends: [phone]   option #4 and ask to speak to the on-call Cardiologist.          If you need a medication refill please contact your pharmacy.  Please allow 3 business days for your refill to be completed.  ________________________________________________________________________________________________________________________________        "

## 2021-09-24 NOTE — LETTER
"9/24/2021      RE: Mil Seals  [address]  [place] [facility] [address]       Dear Colleague,    Thank you for the opportunity to participate in the care of your patient, Mil Seals, at the [facility] [place] [facility]. Please see a copy of my visit note below.    [facility]  CARDIOVASCULAR MEDICINE CLINIC NOTE    Referring Provider: Referred Self   Primary Care Provider: Sari Hooker     Patient Name: Mil Seals   MRN: [id number]       Chief complaint: cardiac assessment for MM therapy    HPI:   Mil Seals is a 61 year old female with a pmhx sig for moderate asthma and multiple myeloma s/p daratumumab/bortezomib/lenalidomide/dexamethasone currently being worked up  for autologous Stem Cell Transplant who is here for cardiac assessment. Her stem cell transplant protocol utilizes melphalan as a conditioning regimen.    She had an echocardiogram which showed a normal EF of 60-65% and GLS of -19.9%. ECG showed normal sinus rhythm with normal voltage. A spot check troponin was normal. She claims that she has been very inactive because of lack of energy. She does do her ADL's she lives alone. She does not have any heart issues. Ever since she started her lenalidomide she has been having intermittent palpitations, currently she is off the medication and tells me her palpitations are less frequent and last only a few seconds they feel like a \"flutter in her chest\" but are not associated with any other symptoms including shortness of breath dizziness or syncope.     Current cardiac meds- none    CURRENT MEDICATIONS:  Current Outpatient Medications   Medication Sig Dispense Refill     albuterol (PROAIR HFA/PROVENTIL HFA/VENTOLIN HFA) 108 (90 Base) MCG/ACT inhaler Inhale 2 puffs into the lungs every 4 hours as needed for shortness of breath / dyspnea or wheezing       Ascorbic Acid (VITAMIN C) 500 MG CHEW " Take by mouth daily        budesonide-formoterol (SYMBICORT) 160-4.5 MCG/ACT Inhaler Inhale 2 puffs into the lungs 2 times daily As needed       calcium carbonate (OS-BRUNA) 1500 (600 Ca) MG tablet Take 1,530 mg by mouth 2 times daily (with meals)       fish oil-omega-3 fatty acids 500 MG capsule Take 1,500 mg by mouth daily        gabapentin (NEURONTIN) 100 MG capsule Take 100 mg by mouth 2 times daily Taking prn       hydrocortisone 2.5 % cream Apply topically 2 times daily as needed For vaginal dryness        Lactobacillus Acid-Pectin (LACTOBACILLUS ACIDOPHILUS) TABS Take 1 tablet by mouth 3 times daily (before meals)       lamoTRIgine (LAMICTAL) 100 MG tablet Take 300 mg by mouth daily       loratadine (CLARITIN) 10 MG tablet Take 10 mg by mouth daily       LORazepam (ATIVAN) 0.5 MG tablet Take 0.5 mg by mouth nightly as needed for anxiety       Nutritional Supplements (ADULT NUTRITIONAL SUPPLEMENT + OR) DIM 900mg daily       Nutritional Supplements (ADULT NUTRITIONAL SUPPLEMENT + OR) Take by mouth 2 times daily Tryphylla 12,000mg daily        Nutritional Supplements (ADULT NUTRITIONAL SUPPLEMENT + OR) Take by mouth daily Calm powder- magnesium/calcium supplement        ondansetron (ZOFRAN) 8 MG tablet Take 8 mg by mouth       prazosin (MINIPRESS) 1 MG capsule Take 1 mg by mouth 3 times daily       prochlorperazine (COMPAZINE) 10 MG tablet Take 10 mg by mouth every 6 hours as needed for nausea or vomiting       vitamin D3 (CHOLECALCIFEROL) 250 mcg (59471 units) capsule Take 1 capsule by mouth daily         PAST MEDICAL HISTORY:  No past medical history on file.    PAST SURGICAL HISTORY:  No past surgical history on file.    ALLERGIES:     Allergies   Allergen Reactions     Acyclovir      The patient states this medication resulted in nausea, fatigue, dizziness, and agitation.     Azithromycin Nausea     Codeine      Hydrocodone Nausea and Vomiting     Morphine Nausea and Vomiting     Mushroom      fungus      Penicillins      As child. Nausea, feels ill.      Sulfa Drugs Rash     Long time ago       FAMILY HISTORY:  No family history on file.  MGF heart attack in his 50's  MGM heart conditions  Mother AF  PGM pacemaker    SOCIAL HISTORY:  Social History     Tobacco Use     Smoking status: Former Smoker     Quit date:      Years since quittin.7     Smokeless tobacco: Never Used   Substance Use Topics     Alcohol use: Not Currently     Drug use: None       ROS:   A comprehensive 14 point review of systems is negative other than as mentioned in HPI.    Exam:  /76 (BP Location: Right arm, Patient Position: Chair, Cuff Size: Adult Regular)   Pulse 71   Wt 73.9 kg (163 lb)   SpO2 96%   BMI 25.49 kg/m    Body mass index is 25.49 kg/m .  Wt Readings from Last 2 Encounters:   21 73.9 kg (163 lb)   21 74.3 kg (163 lb 12.8 oz)     Constitutional: no acute distress, pleasant and cooperative, appears overall well.  Eyes:sclera white, conjunctiva clear, without icterus or pallor   Ears/Nose/Mouth/Throat: mucosa moist, no central cyanosis, Nares patent b/l, moist mucous membranes  Cardiovascular: RRR nl S1S2, JVP normal, extremities with no edema or cyanosis  Respiratory: clear to auscultation bilaterally -no rales, rhonchi or wheezes, no use of accessory muscles, no retractions, respirations are unlabored, normal respiratory rate  Gastrointestinal: soft, nontender, non distended, no hepatosplenomegaly or masses  Musculoskeletal: normal muscle bulk and tone, joints   Skin: normal skin appearance without worrisome lesions.   Neurologic: Alert and oriented, face symmetric, normal gait  Psychiatric: appropriate affect, cooperative    Labs:  Reviewed.   Recent Labs   Lab Test 21  1216      POTASSIUM 4.1   CHLORIDE 110*   CO2 28   BUN 15   CR 0.71     CBC RESULTS:   Recent Labs   Lab Test 21  0934   WBC 4.8   RBC 3.99   HGB 12.1   HCT 37.5   MCV 94   MCH 30.3   MCHC 32.3   RDW 15.2*         No results found for: CHOL  No results found for: HDL  No results found for: LDL  No results found for: TRIG  No results found for: CHOLHDLRATIO  INR   Date Value Ref Range Status   09/20/2021 1.00 0.85 - 1.15 Final     Comment:     Effective 7/11/2021, the reference range for this assay has changed.       TSH   Date Value Ref Range Status   09/24/2021 2.79 0.40 - 4.00 mU/L Final     No results for input(s): A1C in the last 67055 hours.    Testing/Procedures:  I personally reviewed all the following information:    EKG (Date 9/2021): sinus rhythm with normal voltages    TTE (Date 9/2021):  Global and regional left ventricular function is normal with an EF of 60-65%.3D LVEF volumetric analysis is 61.8%.  Global peak LV longitudinal strain is averaged at -19.9%. This is within reported normal limits (normal <-18%).  Right ventricular function, chamber size, wall motion, and thickness are normal.  No pericardial effusion is present.    PFT (Date 9/2021):Moderate airway obstruction and a response to bronchodilators indicates asthma.    Assessment and Plan:   Mil Seals is a 61 year old F with a history of moderate asthma and multiple myeloma s/p daratumumab/bortezomib/lenalidomide/dexamethasone currently being worked up  for autologous Stem Cell Transplant who is here for cardiac assessment.    She is coming for assessment prior to BMT     Immunomodulatory agents (thalidomide, lenalidomide)  - have been shown to increase risk of VTE specially when given with steroids and there is a risk for arterial thromboembolic events with a significant risk of stroke and MI.   - thus if there is no CI would start patient on asa    - she states she has been feeling palpitations ever since she started taking this and after stopping it they have become lest frequent though persisting. Her ECG shows normal sinus rhythm with sinus arrhythmia. Her symptoms do not sound concerning but will assess her palpitations with a 3d  monitor.     Melphalan- alkylating agent  - known to rarely cause cardiotoxicity including arrhythmias and LV dysfunction  - should be monitored on telemetry while in patient and maintain K>4, Mg>2    Currently has normal cardiac function is euvolemic and besides her palpitations asymptomatic from a cardiac standpoint.   Risk factor modification, last lipid panel was 8 m ago ASCVD is 4.1% no indication for statin at this time.   BP- under control with no need for medication  -we discussed recommendation of 150 min moderate intensity exercise /week which is limited by her fatigue  - discussed the need for heart healthy diet including a diet rich in fruits, vegetables and fiber and low on carbonated beverages sugar  -discussed recommendation of moderation of alcohol, salt and NSAIDs    Repeat echocardiogram ordered in 3 m after she has her BMT    >50% of this 40 minute visit were spent with the patient and family on in-person counseling and discussion regarding the above issues, the remainder of the time was spent in chart review, documentation, ordering and communication with other providers.    The patient states understanding and is agreeable with plan.     Yamilka Rosas MD  Cardiology    CC  SELF, REFERRED        Please do not hesitate to contact me if you have any questions/concerns.     Sincerely,     Yamilka Rosas MD

## 2021-09-24 NOTE — NURSING NOTE
Chief Complaint   Patient presents with     New Patient     09/24/2021- NEW- BMT Cardiac Clearance      Vitals were taken and medications reconciled.    Sloan Lew, EMT  2:19 PM

## 2021-09-24 NOTE — TELEPHONE ENCOUNTER
Spoke with caregiver Lori with instructions to get MyChart set up. Confirmed appointment for line placement and the need for a  that day on 10/4. Social work aware of timeline for collections and anticipated admission date.     Pt given calendar for G-priming and collections.   10/1 - 830 COVID Test, 900 JULEE, 930 GCSF   10/2 - 800 GCSF   10/3 - 800 GCSF     10/4 - 630 check in for an 800 line placement in the [facility], 915 Labs, 930 GCSF, 1000 JULEE     10/5 - 700 labs, 730 GCSF, 800 Collections, 1100 JULEE   10/6 - 800 Collections, 1100 JULEE   10/7 - 800 Collections, 1100 JULEE      Pt given instructions and Hibiclens for pre procedure scrub to be done the night before and morning of procedure. Pt instructed to be NPO after midnight on 10/4 and clears until 6am.     Pt instructed to call with any questions and will be notified if there are any concerns with ultrasound, cardiac consult or labs.

## 2021-09-24 NOTE — PROGRESS NOTES
"[facility]  CARDIOVASCULAR MEDICINE CLINIC NOTE    Referring Provider: Referred Self   Primary Care Provider: Sari Hooker     Patient Name: Mil Seals   MRN: [id number]       Chief complaint: cardiac assessment for MM therapy    HPI:   Mil Seals is a 61 year old female with a pmhx sig for moderate asthma and multiple myeloma s/p daratumumab/bortezomib/lenalidomide/dexamethasone currently being worked up  for autologous Stem Cell Transplant who is here for cardiac assessment. Her stem cell transplant protocol utilizes melphalan as a conditioning regimen.    She had an echocardiogram which showed a normal EF of 60-65% and GLS of -19.9%. ECG showed normal sinus rhythm with normal voltage. A spot check troponin was normal. She claims that she has been very inactive because of lack of energy. She does do her ADL's she lives alone. She does not have any heart issues. Ever since she started her lenalidomide she has been having intermittent palpitations, currently she is off the medication and tells me her palpitations are less frequent and last only a few seconds they feel like a \"flutter in her chest\" but are not associated with any other symptoms including shortness of breath dizziness or syncope.     Current cardiac meds- none    CURRENT MEDICATIONS:  Current Outpatient Medications   Medication Sig Dispense Refill     albuterol (PROAIR HFA/PROVENTIL HFA/VENTOLIN HFA) 108 (90 Base) MCG/ACT inhaler Inhale 2 puffs into the lungs every 4 hours as needed for shortness of breath / dyspnea or wheezing       Ascorbic Acid (VITAMIN C) 500 MG CHEW Take by mouth daily        budesonide-formoterol (SYMBICORT) 160-4.5 MCG/ACT Inhaler Inhale 2 puffs into the lungs 2 times daily As needed       calcium carbonate (OS-BRUNA) 1500 (600 Ca) MG tablet Take 1,530 mg by mouth 2 times daily (with meals)       fish oil-omega-3 fatty acids 500 MG capsule Take 1,500 mg by mouth daily        gabapentin " (NEURONTIN) 100 MG capsule Take 100 mg by mouth 2 times daily Taking prn       hydrocortisone 2.5 % cream Apply topically 2 times daily as needed For vaginal dryness        Lactobacillus Acid-Pectin (LACTOBACILLUS ACIDOPHILUS) TABS Take 1 tablet by mouth 3 times daily (before meals)       lamoTRIgine (LAMICTAL) 100 MG tablet Take 300 mg by mouth daily       loratadine (CLARITIN) 10 MG tablet Take 10 mg by mouth daily       LORazepam (ATIVAN) 0.5 MG tablet Take 0.5 mg by mouth nightly as needed for anxiety       Nutritional Supplements (ADULT NUTRITIONAL SUPPLEMENT + OR) DIM 900mg daily       Nutritional Supplements (ADULT NUTRITIONAL SUPPLEMENT + OR) Take by mouth 2 times daily Tryphylla 12,000mg daily        Nutritional Supplements (ADULT NUTRITIONAL SUPPLEMENT + OR) Take by mouth daily Calm powder- magnesium/calcium supplement        ondansetron (ZOFRAN) 8 MG tablet Take 8 mg by mouth       prazosin (MINIPRESS) 1 MG capsule Take 1 mg by mouth 3 times daily       prochlorperazine (COMPAZINE) 10 MG tablet Take 10 mg by mouth every 6 hours as needed for nausea or vomiting       vitamin D3 (CHOLECALCIFEROL) 250 mcg (17999 units) capsule Take 1 capsule by mouth daily         PAST MEDICAL HISTORY:  No past medical history on file.    PAST SURGICAL HISTORY:  No past surgical history on file.    ALLERGIES:     Allergies   Allergen Reactions     Acyclovir      The patient states this medication resulted in nausea, fatigue, dizziness, and agitation.     Azithromycin Nausea     Codeine      Hydrocodone Nausea and Vomiting     Morphine Nausea and Vomiting     Mushroom      fungus     Penicillins      As child. Nausea, feels ill.      Sulfa Drugs Rash     Long time ago       FAMILY HISTORY:  No family history on file.  MGF heart attack in his 50's  MGM heart conditions  Mother AF  PGM pacemaker    SOCIAL HISTORY:  Social History     Tobacco Use     Smoking status: Former Smoker     Quit date: 1978     Years since quitting:  43.7     Smokeless tobacco: Never Used   Substance Use Topics     Alcohol use: Not Currently     Drug use: None       ROS:   A comprehensive 14 point review of systems is negative other than as mentioned in HPI.    Exam:  /76 (BP Location: Right arm, Patient Position: Chair, Cuff Size: Adult Regular)   Pulse 71   Wt 73.9 kg (163 lb)   SpO2 96%   BMI 25.49 kg/m    Body mass index is 25.49 kg/m .  Wt Readings from Last 2 Encounters:   09/24/21 73.9 kg (163 lb)   09/22/21 74.3 kg (163 lb 12.8 oz)     Constitutional: no acute distress, pleasant and cooperative, appears overall well.  Eyes:sclera white, conjunctiva clear, without icterus or pallor   Ears/Nose/Mouth/Throat: mucosa moist, no central cyanosis, Nares patent b/l, moist mucous membranes  Cardiovascular: RRR nl S1S2, JVP normal, extremities with no edema or cyanosis  Respiratory: clear to auscultation bilaterally -no rales, rhonchi or wheezes, no use of accessory muscles, no retractions, respirations are unlabored, normal respiratory rate  Gastrointestinal: soft, nontender, non distended, no hepatosplenomegaly or masses  Musculoskeletal: normal muscle bulk and tone, joints   Skin: normal skin appearance without worrisome lesions.   Neurologic: Alert and oriented, face symmetric, normal gait  Psychiatric: appropriate affect, cooperative    Labs:  Reviewed.   Recent Labs   Lab Test 09/20/21  1216      POTASSIUM 4.1   CHLORIDE 110*   CO2 28   BUN 15   CR 0.71     CBC RESULTS:   Recent Labs   Lab Test 09/22/21  0934   WBC 4.8   RBC 3.99   HGB 12.1   HCT 37.5   MCV 94   MCH 30.3   MCHC 32.3   RDW 15.2*        No results found for: CHOL  No results found for: HDL  No results found for: LDL  No results found for: TRIG  No results found for: CHOLHDLRATIO  INR   Date Value Ref Range Status   09/20/2021 1.00 0.85 - 1.15 Final     Comment:     Effective 7/11/2021, the reference range for this assay has changed.       TSH   Date Value Ref Range  Status   09/24/2021 2.79 0.40 - 4.00 mU/L Final     No results for input(s): A1C in the last 28032 hours.    Testing/Procedures:  I personally reviewed all the following information:    EKG (Date 9/2021): sinus rhythm with normal voltages    TTE (Date 9/2021):  Global and regional left ventricular function is normal with an EF of 60-65%.3D LVEF volumetric analysis is 61.8%.  Global peak LV longitudinal strain is averaged at -19.9%. This is within reported normal limits (normal <-18%).  Right ventricular function, chamber size, wall motion, and thickness are normal.  No pericardial effusion is present.    PFT (Date 9/2021):Moderate airway obstruction and a response to bronchodilators indicates asthma.    Assessment and Plan:   Mil Seals is a 61 year old F with a history of moderate asthma and multiple myeloma s/p daratumumab/bortezomib/lenalidomide/dexamethasone currently being worked up  for autologous Stem Cell Transplant who is here for cardiac assessment.    She is coming for assessment prior to BMT     Immunomodulatory agents (thalidomide, lenalidomide)  - have been shown to increase risk of VTE specially when given with steroids and there is a risk for arterial thromboembolic events with a significant risk of stroke and MI.   - thus if there is no CI would start patient on asa    - she states she has been feeling palpitations ever since she started taking this and after stopping it they have become lest frequent though persisting. Her ECG shows normal sinus rhythm with sinus arrhythmia. Her symptoms do not sound concerning but will assess her palpitations with a 3d monitor.     Melphalan- alkylating agent  - known to rarely cause cardiotoxicity including arrhythmias and LV dysfunction  - should be monitored on telemetry while in patient and maintain K>4, Mg>2    Currently has normal cardiac function is euvolemic and besides her palpitations asymptomatic from a cardiac standpoint.   Risk factor  modification, last lipid panel was 8 m ago ASCVD is 4.1% no indication for statin at this time.   BP- under control with no need for medication  -we discussed recommendation of 150 min moderate intensity exercise /week which is limited by her fatigue  - discussed the need for heart healthy diet including a diet rich in fruits, vegetables and fiber and low on carbonated beverages sugar  -discussed recommendation of moderation of alcohol, salt and NSAIDs    Repeat echocardiogram ordered in 3 m after she has her BMT    >50% of this 40 minute visit were spent with the patient and family on in-person counseling and discussion regarding the above issues, the remainder of the time was spent in chart review, documentation, ordering and communication with other providers.    The patient states understanding and is agreeable with plan.     Yamilka Rosas MD  Cardiology    CC  SELF, REFERRED

## 2021-09-28 ENCOUNTER — TELEPHONE (OUTPATIENT)
Dept: TRANSPLANT | Facility: CLINIC | Age: 61
End: 2021-09-28

## 2021-09-28 DIAGNOSIS — C90.00 MULTIPLE MYELOMA, REMISSION STATUS UNSPECIFIED (H): Primary | ICD-10-CM

## 2021-09-28 LAB — SCANNED LAB RESULT: NORMAL

## 2021-09-28 NOTE — TELEPHONE ENCOUNTER
BMT CLINICAL SOCIAL WORK NOTE:    Focus: Supportive Counseling    Data: Pt is a 62 y/o female planning to start collections on 10/4    Interventions: Clinical  (CSW) spoke with the pt and [facility] to start process to get the pt's lodging set-up for the BMT process. SW called [facility] [phone] and submitted request for lodging for pt's week of collections and estimated discharge date from the hospital and need for lodging 30 days post discharge. Per [facility] they will call and confirm approval. Approval for mileage reimbursement # [id number]. Updated the pt with the following information.     CSW did get a call back from Gabby at [facility], she requested that we send a letter asking for the pt's dates of need and also for CSW to complete the [facility] Lodging Request form. Letter and form completed with estimated dates as follows:  10/3-10/7- line placement and apheresis  10/14-11/14- Post BMT care.    CSW updated the pt regarding the following information. CSW encouraged Pt to contact CSW for support, questions and/or resources.     Plan: CSW will continue to work with Pt and family to provide supportive counseling and assist with resources as needed. CSW will continue to collaborate with multidisciplinary team regarding Pt's plan of care.     JEMIMA Lyn, [facility]  Pager: [phone]  Phone: [phone]

## 2021-09-30 NOTE — PROGRESS NOTES
[facility]MT [facility]    ID: 62 yo F, MM, here for first dose of GCSF prior to stem cell collection.    Interim hx:  She is very nervous about her diagnosis, transplant, outcome.  She is ready to proceed with GCSF.  She feels fine.  No medical complaints, just anxiety.  No n/v/d.  No fever.    A 10 point review of systems was negative other than noted above.       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 RT to sternum/rib 3990Gy 7/20/20-8/7/20, [place]  2. Janey/VRd since 5/11/21 for ~5 cycles, CR, last cycle finished 9/7 (C6D15)    PMH: Asthma, PTSD  Allergy: sulfa (remote; willing to try again), pcn (n/v; ok to try cefepime in needed)    Current Outpatient Medications   Medication     albuterol (PROAIR HFA/PROVENTIL HFA/VENTOLIN HFA) 108 (90 Base) MCG/ACT inhaler     Ascorbic Acid (VITAMIN C) 500 MG CHEW     budesonide-formoterol (SYMBICORT) 160-4.5 MCG/ACT Inhaler     calcium carbonate (OS-BRUNA) 1500 (600 Ca) MG tablet     fish oil-omega-3 fatty acids 500 MG capsule     gabapentin (NEURONTIN) 100 MG capsule     hydrocortisone 2.5 % cream     Lactobacillus Acid-Pectin (LACTOBACILLUS ACIDOPHILUS) TABS     lamoTRIgine (LAMICTAL) 100 MG tablet     loratadine (CLARITIN) 10 MG tablet     LORazepam (ATIVAN) 0.5 MG tablet     Nutritional Supplements (ADULT NUTRITIONAL SUPPLEMENT + OR)     Nutritional Supplements (ADULT NUTRITIONAL SUPPLEMENT + OR)     Nutritional Supplements (ADULT NUTRITIONAL SUPPLEMENT + OR)     ondansetron (ZOFRAN) 8 MG tablet     prazosin (MINIPRESS) 1 MG capsule     prochlorperazine (COMPAZINE) 10 MG tablet     vitamin D3 (CHOLECALCIFEROL) 250 mcg (98045 units) capsule     No current facility-administered medications for this visit.     Facility-Administered Medications Ordered in Other Visits   Medication     sodium  "chloride 0.9 % bag TABLE SOLN     Ph/E:   Blood pressure 97/63, pulse 81, temperature 98.6  F (37  C), resp. rate 16, height 1.715 m (5' 7.5\"), weight 74.1 kg (163 lb 4.8 oz), SpO2 99 %.      General: NAD; H&N: no mucosal lesions; Lungs clear; Heart RRR; Abdomen; Soft, No organomegaly; Extremities: No edema; Skin: No rash; Neuro: Nonfocal; Mood/Affect: appropriate    A&P:   1. 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     - Refer to psych per her request to help manage PTSD/anxiety while inpt    HCT-CI score: 4 (FEV1%pred, depression). We counseled the patient about the impact of this on the risk of treatment related and overall mortality. The score fit within treatment protocol eligibility criteria.    Karnofsky performance score: 80%      -- High risk MM (del17p, p53 mutation, t(4;14), t(14;16), t(14;20), abnormal chromosome 1, -13 [by metaphase testing], or plasma cell leukemia), and the collection goal is 5 million CD34/kg for a single transplant..  The day for admission to begin melphalan conditioning is Day -1 for melphalan 200 mg/m2 (not frail, creatinine clearance 30+). .    10/1 ok to proceed w/ first dose of GCSF; not sexually active, LMP ~10 years ago    2.  Endo:   thyroid US with multiple nodules and 1 among them that meets criteria for Bx. Since most thyroid cancers that arise in the context of multinodular thyroid are slow growing and this nodule was diagnosed incidentally, will proceed with transplant and biopsy this at count recovery, day 28 auto. Dr. Joyner notified the patient by phone and notified her BMT coordinator by email to schedule the biopsy.     RTC:  10/1, 10/2, 10/3 GCSF only visit  10/4 central line placment; f/u pre-collection  10/5 planned first day of stem cell collection    30 minutes spent on the date of the encounter doing chart review, history and exam, " documentation and further activities per the note  {    Pattie Lyn

## 2021-10-01 ENCOUNTER — ONCOLOGY VISIT (OUTPATIENT)
Dept: TRANSPLANT | Facility: CLINIC | Age: 61
End: 2021-10-01
Attending: PHYSICIAN ASSISTANT
Payer: COMMERCIAL

## 2021-10-01 ENCOUNTER — ALLIED HEALTH/NURSE VISIT (OUTPATIENT)
Dept: TRANSPLANT | Facility: CLINIC | Age: 61
End: 2021-10-01
Attending: INTERNAL MEDICINE
Payer: COMMERCIAL

## 2021-10-01 ENCOUNTER — LAB (OUTPATIENT)
Dept: LAB | Facility: CLINIC | Age: 61
End: 2021-10-01
Attending: INTERNAL MEDICINE
Payer: COMMERCIAL

## 2021-10-01 ENCOUNTER — TELEPHONE (OUTPATIENT)
Dept: TRANSPLANT | Facility: CLINIC | Age: 61
End: 2021-10-01

## 2021-10-01 VITALS
WEIGHT: 163.3 LBS | OXYGEN SATURATION: 99 % | TEMPERATURE: 98.6 F | SYSTOLIC BLOOD PRESSURE: 97 MMHG | RESPIRATION RATE: 16 BRPM | HEIGHT: 68 IN | HEART RATE: 81 BPM | BODY MASS INDEX: 24.75 KG/M2 | DIASTOLIC BLOOD PRESSURE: 63 MMHG

## 2021-10-01 DIAGNOSIS — C90.00 MULTIPLE MYELOMA, REMISSION STATUS UNSPECIFIED (H): Primary | ICD-10-CM

## 2021-10-01 DIAGNOSIS — Z11.59 ENCOUNTER FOR SCREENING FOR OTHER VIRAL DISEASES: ICD-10-CM

## 2021-10-01 LAB — SARS-COV-2 RNA RESP QL NAA+PROBE: NEGATIVE

## 2021-10-01 PROCEDURE — 250N000011 HC RX IP 250 OP 636: Performed by: INTERNAL MEDICINE

## 2021-10-01 PROCEDURE — 96372 THER/PROPH/DIAG INJ SC/IM: CPT | Performed by: INTERNAL MEDICINE

## 2021-10-01 PROCEDURE — 99214 OFFICE O/P EST MOD 30 MIN: CPT

## 2021-10-01 PROCEDURE — G0463 HOSPITAL OUTPT CLINIC VISIT: HCPCS

## 2021-10-01 PROCEDURE — U0003 INFECTIOUS AGENT DETECTION BY NUCLEIC ACID (DNA OR RNA); SEVERE ACUTE RESPIRATORY SYNDROME CORONAVIRUS 2 (SARS-COV-2) (CORONAVIRUS DISEASE [COVID-19]), AMPLIFIED PROBE TECHNIQUE, MAKING USE OF HIGH THROUGHPUT TECHNOLOGIES AS DESCRIBED BY CMS-2020-01-R: HCPCS | Mod: 90 | Performed by: PATHOLOGY

## 2021-10-01 PROCEDURE — U0005 INFEC AGEN DETEC AMPLI PROBE: HCPCS | Mod: 90 | Performed by: PATHOLOGY

## 2021-10-01 RX ADMIN — FILGRASTIM 780 MCG: 480 INJECTION, SOLUTION INTRAVENOUS; SUBCUTANEOUS at 10:26

## 2021-10-01 ASSESSMENT — MIFFLIN-ST. JEOR: SCORE: 1341.28

## 2021-10-01 ASSESSMENT — PAIN SCALES - GENERAL: PAINLEVEL: NO PAIN (0)

## 2021-10-01 NOTE — NURSING NOTE
Patient was given Neupogen 780mcg in Right lower abdomen. Patient tolerated well. See MAR for details.    Beryl Mccarty LPN on 10/1/2021 at 10:49 AM

## 2021-10-01 NOTE — LETTER
10/1/2021         RE: Mil Seals  [address]  [facility] [address]        Dear Colleague,    Thank you for referring your patient, Mil Seals, to the [facility]LOOD AND MARROW TRANSPLANT PRO[facility] [place]. Please see a copy of my visit note below.    [facility]    ID: 62 yo F, MM, here for first dose of GCSF prior to stem cell collection.    Interim hx:  She is very nervous about her diagnosis, transplant, outcome.  She is ready to proceed with GCSF.  She feels fine.  No medical complaints, just anxiety.  No n/v/d.  No fever.    A 10 point review of systems was negative other than noted above.       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 RT to sternum/rib 3990Gy 7/20/20-8/7/20, [place]  2. Janey/VRd since 5/11/21 for ~5 cycles, CR, last cycle finished 9/7 (C6D15)    PMH: Asthma, PTSD  Allergy: sulfa (remote; willing to try again), pcn (n/v; ok to try cefepime in needed)    Current Outpatient Medications   Medication     albuterol (PROAIR HFA/PROVENTIL HFA/VENTOLIN HFA) 108 (90 Base) MCG/ACT inhaler     Ascorbic Acid (VITAMIN C) 500 MG CHEW     budesonide-formoterol (SYMBICORT) 160-4.5 MCG/ACT Inhaler     calcium carbonate (OS-BRUNA) 1500 (600 Ca) MG tablet     fish oil-omega-3 fatty acids 500 MG capsule     gabapentin (NEURONTIN) 100 MG capsule     hydrocortisone 2.5 % cream     Lactobacillus Acid-Pectin (LACTOBACILLUS ACIDOPHILUS) TABS     lamoTRIgine (LAMICTAL) 100 MG tablet     loratadine (CLARITIN) 10 MG tablet     LORazepam (ATIVAN) 0.5 MG tablet     Nutritional Supplements (ADULT NUTRITIONAL SUPPLEMENT + OR)     Nutritional Supplements (ADULT NUTRITIONAL SUPPLEMENT + OR)     Nutritional Supplements (ADULT NUTRITIONAL SUPPLEMENT + OR)     ondansetron (ZOFRAN) 8 MG tablet     prazosin  "(MINIPRESS) 1 MG capsule     prochlorperazine (COMPAZINE) 10 MG tablet     vitamin D3 (CHOLECALCIFEROL) 250 mcg (42869 units) capsule     No current facility-administered medications for this visit.     Facility-Administered Medications Ordered in Other Visits   Medication     sodium chloride 0.9 % bag TABLE SOLN     Ph/E:   Blood pressure 97/63, pulse 81, temperature 98.6  F (37  C), resp. rate 16, height 1.715 m (5' 7.5\"), weight 74.1 kg (163 lb 4.8 oz), SpO2 99 %.      General: NAD; H&N: no mucosal lesions; Lungs clear; Heart RRR; Abdomen; Soft, No organomegaly; Extremities: No edema; Skin: No rash; Neuro: Nonfocal; Mood/Affect: appropriate    A&P:   1. 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     - Refer to psych per her request to help manage PTSD/anxiety while inpt    HCT-CI score: 4 (FEV1%pred, depression). We counseled the patient about the impact of this on the risk of treatment related and overall mortality. The score fit within treatment protocol eligibility criteria.    Karnofsky performance score: 80%      -- High risk MM (del17p, p53 mutation, t(4;14), t(14;16), t(14;20), abnormal chromosome 1, -13 [by metaphase testing], or plasma cell leukemia), and the collection goal is 5 million CD34/kg for a single transplant..  The day for admission to begin melphalan conditioning is Day -1 for melphalan 200 mg/m2 (not frail, creatinine clearance 30+). .    10/1 ok to proceed w/ first dose of GCSF; not sexually active, LMP ~10 years ago    2.  Endo:   thyroid US with multiple nodules and 1 among them that meets criteria for Bx. Since most thyroid cancers that arise in the context of multinodular thyroid are slow growing and this nodule was diagnosed incidentally, will proceed with transplant and biopsy this at count recovery, day 28 auto. Dr. Joyner notified the patient by phone and " notified her BMT coordinator by email to schedule the biopsy.     RTC:  10/1, 10/2, 10/3 GCSF only visit  10/4 central line placment; f/u pre-collection  10/5 planned first day of stem cell collection    30 minutes spent on the date of the encounter doing chart review, history and exam, documentation and further activities per the note  {    Pattie Lyn              Again, thank you for allowing me to participate in the care of your patient.        Sincerely,        BMT Advanced Practice Provider

## 2021-10-01 NOTE — NURSING NOTE
"Oncology Rooming Note    October 1, 2021 9:14 AM   Mil Seals is a 61 year old female who presents for:    Chief Complaint   Patient presents with     Oncology Clinic Visit     Multiple myeloma, remission status unspecified      Initial Vitals: BP 97/63   Pulse 81   Temp 98.6  F (37  C)   Resp 16   Ht 1.715 m (5' 7.5\")   Wt 74.1 kg (163 lb 4.8 oz)   SpO2 99%   BMI 25.20 kg/m   Estimated body mass index is 25.2 kg/m  as calculated from the following:    Height as of this encounter: 1.715 m (5' 7.5\").    Weight as of this encounter: 74.1 kg (163 lb 4.8 oz). Body surface area is 1.88 meters squared.  No Pain (0) Comment: Data Unavailable   No LMP recorded. Patient is postmenopausal.  Allergies reviewed: Yes  Medications reviewed: Yes    Medications: Medication refills not needed today.  Pharmacy name entered into [employer]: [id number] - [place], [place] - [address] [place] [address]    Clinical concerns: No new concerns        Peter Estebna MA            "

## 2021-10-01 NOTE — TELEPHONE ENCOUNTER
Called pt to confirm reschedule of GCSF appointment on 10/2 to 10am. Also confirmed Monday appointments are scheduled to go till 10:30am. Left voicemail for Pt.

## 2021-10-02 ENCOUNTER — ALLIED HEALTH/NURSE VISIT (OUTPATIENT)
Dept: TRANSPLANT | Facility: CLINIC | Age: 61
End: 2021-10-02
Attending: INTERNAL MEDICINE
Payer: COMMERCIAL

## 2021-10-02 VITALS
TEMPERATURE: 98.8 F | RESPIRATION RATE: 18 BRPM | SYSTOLIC BLOOD PRESSURE: 111 MMHG | HEART RATE: 88 BPM | DIASTOLIC BLOOD PRESSURE: 73 MMHG | OXYGEN SATURATION: 97 %

## 2021-10-02 DIAGNOSIS — C90.00 MULTIPLE MYELOMA, REMISSION STATUS UNSPECIFIED (H): Primary | ICD-10-CM

## 2021-10-02 PROCEDURE — 250N000011 HC RX IP 250 OP 636: Performed by: INTERNAL MEDICINE

## 2021-10-02 PROCEDURE — 96372 THER/PROPH/DIAG INJ SC/IM: CPT | Performed by: INTERNAL MEDICINE

## 2021-10-02 RX ADMIN — FILGRASTIM 780 MCG: 480 INJECTION, SOLUTION INTRAVENOUS; SUBCUTANEOUS at 10:16

## 2021-10-02 ASSESSMENT — PAIN SCALES - GENERAL: PAINLEVEL: MODERATE PAIN (5)

## 2021-10-02 NOTE — PROGRESS NOTES
Infusion Nursing Note:  Mil Seals presents today for scheduled G-CSF.    Patient seen by provider today: No   present during visit today: Not Applicable.    Note: No labs ordered for today.          Treatment Conditions:  Patient received scheduled G-CSF in her lower left abdomen.      Post Infusion Assessment:  Patient tolerated injection without incident.       Discharge Plan:   Patient discharged in stable condition accompanied by: self.      REDD MCFADDEN RN

## 2021-10-02 NOTE — NURSING NOTE
"Oncology Rooming Note    October 2, 2021 10:33 AM   Mil Seals is a 61 year old female who presents for:    Chief Complaint   Patient presents with     Infusion     scheduled injection pre bmt txp for multiple myeloma     Initial Vitals: /73   Pulse 88   Temp 98.8  F (37.1  C) (Oral)   Resp 18   SpO2 97%  Estimated body mass index is 25.2 kg/m  as calculated from the following:    Height as of 10/1/21: 1.715 m (5' 7.5\").    Weight as of 10/1/21: 74.1 kg (163 lb 4.8 oz). There is no height or weight on file to calculate BSA.  Moderate Pain (5) Comment: Data Unavailable   No LMP recorded. Patient is postmenopausal.  Allergies reviewed: Yes  Medications reviewed: Yes    Medications: Medication refills not needed today.  Pharmacy name entered into [employer]: [id number] - [place], [place] - [address] [place] [address]      Patient has generalized body aches and a headache in the back of her head.  She believes this is related to G-CSF.  She had trouble sleeping last evening.  She felt slightly nauseated, but was able to eat/drink.  She declined the need for further assessment or intervention.  She is using Claritin and Tylenol at home.        REDD MCFADDEN RN              "

## 2021-10-03 ENCOUNTER — ALLIED HEALTH/NURSE VISIT (OUTPATIENT)
Dept: TRANSPLANT | Facility: CLINIC | Age: 61
End: 2021-10-03
Attending: INTERNAL MEDICINE
Payer: COMMERCIAL

## 2021-10-03 DIAGNOSIS — C90.00 MULTIPLE MYELOMA, REMISSION STATUS UNSPECIFIED (H): Primary | ICD-10-CM

## 2021-10-03 PROCEDURE — 96372 THER/PROPH/DIAG INJ SC/IM: CPT | Performed by: INTERNAL MEDICINE

## 2021-10-03 PROCEDURE — 250N000011 HC RX IP 250 OP 636: Performed by: INTERNAL MEDICINE

## 2021-10-03 RX ADMIN — FILGRASTIM 780 MCG: 480 INJECTION, SOLUTION INTRAVENOUS; SUBCUTANEOUS at 10:46

## 2021-10-03 NOTE — PROGRESS NOTES
Chief Complaint   Patient presents with     RECHECK     pre bmt for MM here for GCSF inj     Tolerating inj well, aware of future appointmenmts    Stephanie Ferraro RN on 10/3/2021 at 10:51 AM

## 2021-10-04 ENCOUNTER — HOSPITAL ENCOUNTER (OUTPATIENT)
Facility: AMBULATORY SURGERY CENTER | Age: 61
End: 2021-10-04
Attending: RADIOLOGY
Payer: COMMERCIAL

## 2021-10-04 ENCOUNTER — APPOINTMENT (OUTPATIENT)
Dept: LAB | Facility: CLINIC | Age: 61
End: 2021-10-04
Attending: INTERNAL MEDICINE
Payer: COMMERCIAL

## 2021-10-04 ENCOUNTER — ONCOLOGY VISIT (OUTPATIENT)
Dept: TRANSPLANT | Facility: CLINIC | Age: 61
End: 2021-10-04
Attending: PHYSICIAN ASSISTANT
Payer: COMMERCIAL

## 2021-10-04 ENCOUNTER — ANESTHESIA EVENT (OUTPATIENT)
Dept: SURGERY | Facility: AMBULATORY SURGERY CENTER | Age: 61
End: 2021-10-04
Payer: COMMERCIAL

## 2021-10-04 ENCOUNTER — ALLIED HEALTH/NURSE VISIT (OUTPATIENT)
Dept: TRANSPLANT | Facility: CLINIC | Age: 61
End: 2021-10-04
Attending: INTERNAL MEDICINE
Payer: COMMERCIAL

## 2021-10-04 ENCOUNTER — ANESTHESIA (OUTPATIENT)
Dept: SURGERY | Facility: AMBULATORY SURGERY CENTER | Age: 61
End: 2021-10-04
Payer: COMMERCIAL

## 2021-10-04 ENCOUNTER — ANCILLARY PROCEDURE (OUTPATIENT)
Dept: RADIOLOGY | Facility: AMBULATORY SURGERY CENTER | Age: 61
End: 2021-10-04
Attending: INTERNAL MEDICINE
Payer: COMMERCIAL

## 2021-10-04 VITALS
TEMPERATURE: 97.6 F | HEIGHT: 67 IN | BODY MASS INDEX: 25.58 KG/M2 | SYSTOLIC BLOOD PRESSURE: 110 MMHG | OXYGEN SATURATION: 98 % | WEIGHT: 163 LBS | HEART RATE: 87 BPM | RESPIRATION RATE: 16 BRPM | DIASTOLIC BLOOD PRESSURE: 59 MMHG

## 2021-10-04 VITALS
WEIGHT: 162.92 LBS | BODY MASS INDEX: 25.52 KG/M2 | TEMPERATURE: 97.6 F | HEART RATE: 87 BPM | OXYGEN SATURATION: 98 % | DIASTOLIC BLOOD PRESSURE: 59 MMHG | SYSTOLIC BLOOD PRESSURE: 110 MMHG

## 2021-10-04 DIAGNOSIS — C90.00 MULTIPLE MYELOMA, REMISSION STATUS UNSPECIFIED (H): Primary | ICD-10-CM

## 2021-10-04 DIAGNOSIS — C90.00 MULTIPLE MYELOMA, REMISSION STATUS UNSPECIFIED (H): ICD-10-CM

## 2021-10-04 LAB
BASOPHILS # BLD MANUAL: 0 10E3/UL (ref 0–0.2)
BASOPHILS NFR BLD MANUAL: 0 %
CD34 ABSOLUTE COUNT COMMENT: NORMAL
CD34 CELLS # SPEC: 20 CELLS/UL
CD34 CELLS NFR SPEC: 0.1 %
EOSINOPHIL # BLD MANUAL: 0.8 10E3/UL (ref 0–0.7)
EOSINOPHIL NFR BLD MANUAL: 4 %
ERYTHROCYTE [DISTWIDTH] IN BLOOD BY AUTOMATED COUNT: 14.7 % (ref 10–15)
HCT VFR BLD AUTO: 32 % (ref 35–47)
HGB BLD-MCNC: 10.7 G/DL (ref 11.7–15.7)
HOLD SPECIMEN: NORMAL
HOLD SPECIMEN: NORMAL
LYMPHOCYTES # BLD MANUAL: 1.5 10E3/UL (ref 0.8–5.3)
LYMPHOCYTES NFR BLD MANUAL: 7 %
MCH RBC QN AUTO: 30.2 PG (ref 26.5–33)
MCHC RBC AUTO-ENTMCNC: 33.4 G/DL (ref 31.5–36.5)
MCV RBC AUTO: 90 FL (ref 78–100)
METAMYELOCYTES # BLD MANUAL: 0.2 10E3/UL
METAMYELOCYTES NFR BLD MANUAL: 1 %
MONOCYTES # BLD MANUAL: 0.4 10E3/UL (ref 0–1.3)
MONOCYTES NFR BLD MANUAL: 2 %
MYELOCYTES # BLD MANUAL: 0.4 10E3/UL
MYELOCYTES NFR BLD MANUAL: 2 %
NEUTROPHILS # BLD MANUAL: 17.7 10E3/UL (ref 1.6–8.3)
NEUTROPHILS NFR BLD MANUAL: 84 %
PLAT MORPH BLD: ABNORMAL
PLATELET # BLD AUTO: 176 10E3/UL (ref 150–450)
PRODUCT NUMBER FLOW CYTOMETRY: NORMAL
RBC # BLD AUTO: 3.54 10E6/UL (ref 3.8–5.2)
RBC MORPH BLD: ABNORMAL
VIABLE CD34 CELLS NFR FLD: 96.75 %
WBC # BLD AUTO: 21.1 10E3/UL (ref 4–11)

## 2021-10-04 PROCEDURE — 36558 INSERT TUNNELED CV CATH: CPT

## 2021-10-04 PROCEDURE — 77001 FLUOROGUIDE FOR VEIN DEVICE: CPT | Mod: 26 | Performed by: RADIOLOGY

## 2021-10-04 PROCEDURE — 96372 THER/PROPH/DIAG INJ SC/IM: CPT | Performed by: INTERNAL MEDICINE

## 2021-10-04 PROCEDURE — 76937 US GUIDE VASCULAR ACCESS: CPT | Mod: 26 | Performed by: RADIOLOGY

## 2021-10-04 PROCEDURE — 85014 HEMATOCRIT: CPT

## 2021-10-04 PROCEDURE — 36592 COLLECT BLOOD FROM PICC: CPT | Performed by: PHYSICIAN ASSISTANT

## 2021-10-04 PROCEDURE — 250N000011 HC RX IP 250 OP 636: Performed by: INTERNAL MEDICINE

## 2021-10-04 PROCEDURE — 36592 COLLECT BLOOD FROM PICC: CPT

## 2021-10-04 PROCEDURE — 250N000011 HC RX IP 250 OP 636: Performed by: PHYSICIAN ASSISTANT

## 2021-10-04 PROCEDURE — G0463 HOSPITAL OUTPT CLINIC VISIT: HCPCS

## 2021-10-04 PROCEDURE — 36558 INSERT TUNNELED CV CATH: CPT | Performed by: RADIOLOGY

## 2021-10-04 PROCEDURE — 86367 STEM CELLS TOTAL COUNT: CPT

## 2021-10-04 PROCEDURE — 99214 OFFICE O/P EST MOD 30 MIN: CPT

## 2021-10-04 DEVICE — CATH VA PRECISION CHRONIC PALINDROME 14.5FRX23CM 8888123400P: Type: IMPLANTABLE DEVICE | Site: CHEST | Status: FUNCTIONAL

## 2021-10-04 RX ORDER — HEPARIN SODIUM (PORCINE) LOCK FLUSH IV SOLN 100 UNIT/ML 100 UNIT/ML
SOLUTION INTRAVENOUS PRN
Status: DISCONTINUED | OUTPATIENT
Start: 2021-10-04 | End: 2021-10-04 | Stop reason: HOSPADM

## 2021-10-04 RX ORDER — HEPARIN SODIUM (PORCINE) LOCK FLUSH IV SOLN 100 UNIT/ML 100 UNIT/ML
3 SOLUTION INTRAVENOUS ONCE
Status: CANCELLED | OUTPATIENT
Start: 2021-10-04 | End: 2021-10-04

## 2021-10-04 RX ORDER — HEPARIN SODIUM (PORCINE) LOCK FLUSH IV SOLN 100 UNIT/ML 100 UNIT/ML
3 SOLUTION INTRAVENOUS EVERY 24 HOURS
Status: DISCONTINUED | OUTPATIENT
Start: 2021-10-04 | End: 2021-10-05 | Stop reason: HOSPADM

## 2021-10-04 RX ORDER — HEPARIN SODIUM (PORCINE) LOCK FLUSH IV SOLN 100 UNIT/ML 100 UNIT/ML
3 SOLUTION INTRAVENOUS
Status: DISCONTINUED | OUTPATIENT
Start: 2021-10-04 | End: 2021-10-05 | Stop reason: HOSPADM

## 2021-10-04 RX ORDER — LIDOCAINE 40 MG/G
CREAM TOPICAL
Status: DISCONTINUED | OUTPATIENT
Start: 2021-10-04 | End: 2021-10-05 | Stop reason: HOSPADM

## 2021-10-04 RX ORDER — LIDOCAINE HYDROCHLORIDE 20 MG/ML
INJECTION, SOLUTION INFILTRATION; PERINEURAL PRN
Status: DISCONTINUED | OUTPATIENT
Start: 2021-10-04 | End: 2021-10-04

## 2021-10-04 RX ORDER — CLINDAMYCIN PHOSPHATE 900 MG/50ML
900 INJECTION, SOLUTION INTRAVENOUS
Status: COMPLETED | OUTPATIENT
Start: 2021-10-04 | End: 2021-10-04

## 2021-10-04 RX ORDER — HEPARIN SODIUM,PORCINE 10 UNIT/ML
3 VIAL (ML) INTRAVENOUS
Status: DISCONTINUED | OUTPATIENT
Start: 2021-10-04 | End: 2021-10-04 | Stop reason: HOSPADM

## 2021-10-04 RX ORDER — SODIUM CHLORIDE, SODIUM LACTATE, POTASSIUM CHLORIDE, CALCIUM CHLORIDE 600; 310; 30; 20 MG/100ML; MG/100ML; MG/100ML; MG/100ML
INJECTION, SOLUTION INTRAVENOUS CONTINUOUS PRN
Status: DISCONTINUED | OUTPATIENT
Start: 2021-10-04 | End: 2021-10-04

## 2021-10-04 RX ORDER — SODIUM CHLORIDE, SODIUM LACTATE, POTASSIUM CHLORIDE, CALCIUM CHLORIDE 600; 310; 30; 20 MG/100ML; MG/100ML; MG/100ML; MG/100ML
500 INJECTION, SOLUTION INTRAVENOUS CONTINUOUS
Status: DISCONTINUED | OUTPATIENT
Start: 2021-10-04 | End: 2021-10-05 | Stop reason: HOSPADM

## 2021-10-04 RX ORDER — PROPOFOL 10 MG/ML
INJECTION, EMULSION INTRAVENOUS CONTINUOUS PRN
Status: DISCONTINUED | OUTPATIENT
Start: 2021-10-04 | End: 2021-10-04

## 2021-10-04 RX ORDER — PROPOFOL 10 MG/ML
INJECTION, EMULSION INTRAVENOUS PRN
Status: DISCONTINUED | OUTPATIENT
Start: 2021-10-04 | End: 2021-10-04

## 2021-10-04 RX ADMIN — PROPOFOL 50 MG: 10 INJECTION, EMULSION INTRAVENOUS at 08:08

## 2021-10-04 RX ADMIN — Medication 3 ML: at 09:29

## 2021-10-04 RX ADMIN — FILGRASTIM 780 MCG: 480 INJECTION, SOLUTION INTRAVENOUS; SUBCUTANEOUS at 10:18

## 2021-10-04 RX ADMIN — SODIUM CHLORIDE, SODIUM LACTATE, POTASSIUM CHLORIDE, CALCIUM CHLORIDE: 600; 310; 30; 20 INJECTION, SOLUTION INTRAVENOUS at 07:45

## 2021-10-04 RX ADMIN — LIDOCAINE HYDROCHLORIDE 50 MG: 20 INJECTION, SOLUTION INFILTRATION; PERINEURAL at 07:57

## 2021-10-04 RX ADMIN — PROPOFOL 150 MCG/KG/MIN: 10 INJECTION, EMULSION INTRAVENOUS at 07:57

## 2021-10-04 RX ADMIN — CLINDAMYCIN PHOSPHATE 900 MG: 900 INJECTION, SOLUTION INTRAVENOUS at 07:56

## 2021-10-04 ASSESSMENT — MIFFLIN-ST. JEOR: SCORE: 1331.99

## 2021-10-04 ASSESSMENT — PAIN SCALES - GENERAL: PAINLEVEL: NO PAIN (0)

## 2021-10-04 NOTE — PROGRESS NOTES
[facility]    ID: 62 yo F, MM, here for first day of stem cell collection.    Interim hx: She has nausea and bone pain today.  She was taking 1.5 g of tylenol at a time.  I have advised against that high of a dose.  Using claritin.  She has pain in pelvis, long bones and even toes.  Did not sleep well because of it.  This AM she has nausea, better with zofran   PO intake is poor.    A 10 point review of systems was negative other than noted above.       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 RT to sternum/rib 3990Gy 7/20/20-8/7/20, [place]  2. Janey/VRd since 5/11/21 for ~5 cycles, CR, last cycle finished 9/7 (C6D15)    PMH: Asthma, PTSD  Allergy: sulfa (remote; willing to try again), pcn (n/v; ok to try cefepime in needed)    Ph/E:     General: NAD; H&N: no mucosal lesions; Lungs clear; Heart RRR; Extremities: No edema; Skin: No rash; Neuro: Nonfocal; Mood/Affect: appropriate. New R chest wall CVC intact.  Some erythema at insertion and exit site.  Tunnel is not tender.    A&P:   1. 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     - CD34 yesterday 20/uL; up to 37/uL today. First day of collection today.      - Reviewed with pt that we are collecting for TWO Transplants * note that there was some discrepancy regarding this, see rationale below.    - Despite her High risk MM (del17p, p53 mutation, t(4;14), t(14;16), t(14;20), abnormal chromosome 1, -13 [by metaphase testing], or plasma cell leukemia), We will collect enough CD34 cells for 2 transplants given her young age and presentation., and the collection goal is 8 million CD34/kg.  The day for admission to begin melphalan conditioning is Day -1 for  melphalan 200 mg/m2 (not frail, creatinine clearance 30+).     CI score: 4 (FEV1%pred, depression).        2.  Endo:   thyroid US with multiple nodules and 1 among them that meets criteria for Bx. Since most thyroid cancers that arise in the context of multinodular thyroid are slow growing and this nodule was diagnosed incidentally, will proceed with transplant and biopsy this at count recovery, day 28 auto. Dr. Joyner notified the patient by phone and notified her BMT coordinator by email to schedule the biopsy.     RTC: Daily. RTC tomorrow for f/u first day of collections and possible second day of collection    60 minutes spent on the date of the encounter doing chart review, history and exam, documentation and further activities per the note, discussion with pt, BMT close physician, BMT nurse coordinator/BMT office, further discussion with pt and apheresis RN regarding target CD 34 goal for 2 transpalnts.  {    Pattie Lyn

## 2021-10-04 NOTE — NURSING NOTE
"Oncology Rooming Note    October 4, 2021 9:43 AM   Mil Seals is a 61 year old female who presents for:    Chief Complaint   Patient presents with     Oncology Clinic Visit     multiple myeloma     Blood Draw     labs drawn by RN in lab. VS taken.     Initial Vitals: /59   Pulse 87   Temp 97.6  F (36.4  C) (Oral)   Wt 73.9 kg (162 lb 14.7 oz)   SpO2 98%   BMI 25.52 kg/m   Estimated body mass index is 25.52 kg/m  as calculated from the following:    Height as of an earlier encounter on 10/4/21: 1.702 m (5' 7\").    Weight as of this encounter: 73.9 kg (162 lb 14.7 oz). Body surface area is 1.87 meters squared.  Data Unavailable Comment: Data Unavailable   No LMP recorded. Patient is postmenopausal.  Allergies reviewed: Yes  Medications reviewed: Yes    Medications: Medication refills not needed today.  Pharmacy name entered into [employer]: [id number] [place] [facility] [address] [place] [address]    Clinical concerns: none       Penelope Maldonado CMA            "

## 2021-10-04 NOTE — PROGRESS NOTES
[facility]    ID: 60 yo F, MM, here for 4th dose of GCSF prior to stem cell collection.    Interim hx: Line placement went okay. Using claritin and tylenol for GCSF pain. Also noticing intake down and some nausea with GCSF.     A 10 point review of systems was negative other than noted above.       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 RT to sternum/rib 3990Gy 7/20/20-8/7/20, [place]  2. Janey/VRd since 5/11/21 for ~5 cycles, CR, last cycle finished 9/7 (C6D15)    PMH: Asthma, PTSD  Allergy: sulfa (remote; willing to try again), pcn (n/v; ok to try cefepime in needed)    Ph/E:   Blood pressure 110/59, pulse 87, temperature 97.6  F (36.4  C), temperature source Oral, weight 73.9 kg (162 lb 14.7 oz), SpO2 98 %.      General: NAD; H&N: no mucosal lesions; Lungs clear; Heart RRR; Extremities: No edema; Skin: No rash; Neuro: Nonfocal; Mood/Affect: appropriate. New R chest wall CVC intact.    A&P:   1. 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   - Day 4 gcsf today.   - Line placed  - COVID neg  - CD34 looks good at 20 today. She will collect tomorrow. Apheresis aware and I notified Mil no mozobil needed today.  - Ok to proceed w/ GCSF; not sexually active, LMP ~10 years ago.     2.  Endo:   thyroid US with multiple nodules and 1 among them that meets criteria for Bx. Since most thyroid cancers that arise in the context of multinodular thyroid are slow growing and this nodule was diagnosed incidentally, will proceed with transplant and biopsy this at count recovery, day 28 auto. Dr. Joyner notified the patient by phone and notified her BMT coordinator by email to schedule the biopsy.     RTC: Daily. Start  collections tomorrow.     30 minutes spent on the date of the encounter doing chart review, history and exam, documentation and further activities per the note  {    Sondra Valerio

## 2021-10-04 NOTE — ANESTHESIA POSTPROCEDURE EVALUATION
Patient: Mil Seals    Procedure(s):  DOUBLE LUMEN LARGE BORE APHARESIS CAPABLE CATHETERINSERTION, VASCULAR ACCESS @0800    Diagnosis:Multiple myeloma, remission status unspecified (H) [C90.00]  Diagnosis Additional Information: No value filed.    Anesthesia Type:  MAC    Note:  Disposition: Outpatient   Postop Pain Control: Uneventful            Sign Out: Well controlled pain   PONV: No   Neuro/Psych: Uneventful            Sign Out: Acceptable/Baseline neuro status   Airway/Respiratory: Uneventful            Sign Out: Acceptable/Baseline resp. status   CV/Hemodynamics: Uneventful            Sign Out: Acceptable CV status; No obvious hypovolemia; No obvious fluid overload   Other NRE: NONE   DID A NON-ROUTINE EVENT OCCUR? No           Last vitals:  Vitals Value Taken Time   BP     Temp     Pulse     Resp     SpO2         Electronically Signed By: Jeff Richey MD, MD  October 4, 2021  8:42 AM

## 2021-10-04 NOTE — ANESTHESIA CARE TRANSFER NOTE
Patient: Mil Seals    Procedure(s):  DOUBLE LUMEN LARGE BORE APHARESIS CAPABLE CATHETERINSERTION, VASCULAR ACCESS @0800    Diagnosis: Multiple myeloma, remission status unspecified (H) [C90.00]  Diagnosis Additional Information: No value filed.    Anesthesia Type:   MAC     Note:    Oropharynx: oropharynx clear of all foreign objects  Level of Consciousness: awake  Oxygen Supplementation: room air    Independent Airway: airway patency satisfactory and stable  Dentition: dentition unchanged  Vital Signs Stable: post-procedure vital signs reviewed and stable  Report to RN Given: handoff report given  Patient transferred to: Phase II  Comments: VSS and WNL, comfortable, no PONV, report to Jazmin FU   Handoff Report: Identifed the Patient, Identified the Reponsible Provider, Reviewed the pertinent medical history, Discussed the surgical course, Reviewed Intra-OP anesthesia mangement and issues during anesthesia, Set expectations for post-procedure period and Allowed opportunity for questions and acknowledgement of understanding      Vitals:  Vitals Value Taken Time   BP     Temp     Pulse     Resp     SpO2         Electronically Signed By: CHELLE Rodriguez CRNA  October 4, 2021  8:32 AM

## 2021-10-04 NOTE — PROGRESS NOTES
Patient scheduled for procedure under MAC anesthesia today.  Patient has BMT appointments after procedure. Responsible adult, Pam, not with patient now and is 1 hour 20 min away. Talked with Pam to see if she can come earlier to accompany to appointments. She can not get here until 10am.  Discussed with BMT clinic who says if our staff brings her to BMT check in they will try to accomodate her getting roomed with call light right away.

## 2021-10-04 NOTE — BRIEF OP NOTE
[facility]    Brief Operative Note    Pre-operative diagnosis: Multiple myeloma, remission status unspecified (H) [C90.00]  Post-operative diagnosis Same as pre-operative diagnosis    Procedure: Procedure(s):  DOUBLE LUMEN LARGE BORE APHARESIS CAPABLE CATHETERINSERTION, VASCULAR ACCESS @0800  Surgeon: Surgeon(s) and Role:     * Clem Oreilly MD - Primary  Anesthesia: Monitor Anesthesia Care   Estimated Blood Loss: Minimal    Drains: None  Specimens: * No specimens in log *  Findings:   Placement of 14.5 Fr 23 cm dual lumen tunneled central venous catheter via right IJ vein.  Complications: None.  Implants:   Implant Name Type Inv. Item Serial No.  Lot No. LRB No. Used Action   CATH [employer]IC PALINDROME [id number] - [id number] Catheter CATH [employer] PRECISION CHRONIC PALINDROME [id number]  [facility] [id number] Right 1 Implanted

## 2021-10-04 NOTE — DISCHARGE INSTRUCTIONS
A collaboration between [facility] and [facility]  Experts in minimally invasive, targeted treatments performed using imaging guidance    Venous Access Device,  Port Catheter or Tunneled or Non-Tunneled Central Line Placement    Today you had a procedure today to install a venous access device; either a tunneled central vein catheter or a subcutaneous port catheter.    After you go home:  - Drink plenty of fluids.  Generally 6-8 (8 ounce) glasses a day is recommended.  - Resume your regular diet unless otherwise ordered by a medical provider.  - Keep any applied tape/gauze dressings clean and dry.  Change tape/gauze dressings if they get wet or soiled.  - You may shower the following day after procedure, however cover and protect from moisture any tape/gauze dressings.  You may let water hit and run over dried skin glue, but do not scrub.  Pat the area dry after showering.  - Port placement incisions are closed with absorbable suture, meaning they do not need to be removed at a later date, and a topical skin adhesive (skin glue).  This glue will wear off in 7-14 days.  Do not remove before this time.  If 14 days have passed and residual glue is present, you may gently remove it.  - Do not apply gels, lotions, or ointments to the glue site for the first 10 days as this may cause the glue to prematurely soften and fail.  - Do not perform strenuous activities or lift greater than 10 pounds for the next three days.  - If there is bleeding or oozing from the procedure site, apply firm pressure to the area for 5-10 minutes.  If the bleeding continues seek medical advice at the numbers below.  - Mild procedure site discomfort can be treated with an ice pack and over-the-counter pain relievers.        For 24 hours after any sedation used:  - Relax and take it easy.  No strenuous activities.  - Do not drive or operate machines at home or at work.  - No alcohol  consumption.  - Do not make any important or legal decisions.    Call our Interventional Radiology (IR) service if:  - If you start bleeding from the procedure site.  If you do start to bleed from the site, lie down and hold some pressure on the site.  Our radiology provider can help you decide if you need to return to the hospital.  - If you have new or worsening pain related to the procedure.  - If you have concerning swelling at the procedure site.  - If you develop persistent nausea or vomiting.  - If you develop hives or a rash or any unexplained itching.  - If you have a fever of greater than 100.5  F and chills in the first 5 days after procedure.  - Any other concerns related to your procedure.      [facility]  Interventional Radiology (IR)  [address]  [address] [facility] [place] [address]    Contact Number:  [phone]  (IR control desk)  - Monday - Friday 8:00 am - 4:30 pm    After hours for urgent concerns:  [phone]  - After 4:30 pm Monday - Friday, Weekends and Holidays.   - Ask for Interventional Radiology on-call.  Someone is available 24 hours a day.  - [facility] toll free number:  [phone]        [facility]  Home Care Following Anesthesia  For 24 hours after surgery:  1. Get plenty of rest.  A responsible adult must stay with you for at least 24 hours after you leave the surgery center.  2. Do not drive or use heavy equipment.  If you have weakness or tingling, don't drive or use heavy equipment until this feeling goes away.   3. Do not drink alcohol.   4. Avoid strenuous or risky activities.  Ask for help when climbing stairs.  5. You may feel lightheaded.  IF so, sit for a few minutes before standing.  Have someone help you get up.   6. If you have nausea (feel sick to your stomach): Drink only clear liquids such as apple juice, ginger ale, broth or 7-Up.  Rest may also help.  Be sure to drink enough  fluids.  Move to a regular diet as you feel able.   7. You may have a slight fever.  Call the doctor if your fever is over 100 F (37.7 C) (taken under the tongue) or lasts longer than 24 hours.  8. You may have a dry mouth, a sore throat, muscle aches or trouble sleeping. These should go away after 24 hours.  9. Do not make important or legal decisions.   10. It is recommended to avoid smoking.               Tips for taking pain medications  To get the best pain relief possible, remember these points:    Take pain medications as directed, before pain becomes severe.    Pain medication can upset your stomach: taking it with food may help.    Constipation is a common side effect of pain medication. Drink plenty of  fluids.    Eat foods high in fiber. Take a stool softener if recommended by your doctor or pharmacist.    Do not drink alcohol, drive or operate machinery while taking pain medications.    Ask about other ways to control pain, such as with heat, ice or relaxation.    Tylenol/Acetaminophen Consumption  To help encourage the safe use of acetaminophen, the makers of TYLENOL  have lowered the maximum daily dose for single-ingredient Extra Strength TYLENOL  (acetaminophen) products sold in the [place] from 8 pills per day (4,000 mg) to 6 pills per day (3,000 mg). The dosing interval has also changed from 2 pills every 4-6 hours to 2 pills every 6 hours.    If you feel your pain relief is insufficient, you may take Tylenol/Acetaminophen in addition to your narcotic pain medication.     Be careful not to exceed 3,000 mg of Tylenol/Acetaminophen in a 24 hour period from all sources.    If you are taking extra strength Tylenol/acetaminophen (500 mg), the maximum dose is 6 tablets in 24 hours.    If you are taking regular strength acetaminophen (325 mg), the maximum dose is 9 tablets in 24 hours.    Call a doctor for any of the followin. Signs of infection (fever, growing tenderness at the surgery site, a large  amount of drainage or bleeding, severe pain, foul-smelling drainage, redness, swelling).  2. It has been over 8 to 10 hours since surgery and you are still not able to urinate (pass water).  3. Headache for over 24 hours.  4. Numbness, tingling or weakness the day after surgery (if you had spinal anesthesia).  5. Signs of Covid-19 infection (temperature over 100 degrees, shortness of breath, cough, loss of taste/smell, generalized body aches, persistent headache, chills, sore throat, nausea/vomiting/diarrhea)  Your doctor is:     Interventional Radiology from 8 am to 5 pm @ [phone].              Or dial [phone] and ask for the resident on call for:  Interventional Radiology  For emergency care, call the:  [facility]:  [phone] (TTY for hearing impaired: [phone])

## 2021-10-04 NOTE — ANESTHESIA PREPROCEDURE EVALUATION
Anesthesia Pre-Procedure Evaluation    Patient: Mil Seals   MRN: [id number] : 1960        Preoperative Diagnosis: Multiple myeloma, remission status unspecified (H) [C90.00]   Procedure : Procedure(s):  DOUBLE LUMEN LARGE BORE APHARESIS CAPABLE CATHETERINSERTION, VASCULAR ACCESS @0800     No past medical history on file.   History reviewed. No pertinent surgical history.   Allergies   Allergen Reactions     Acyclovir      The patient states this medication resulted in nausea, fatigue, dizziness, and agitation.     Azithromycin Nausea     Codeine      Hydrocodone Nausea and Vomiting     L-Glutamine      Morphine Nausea and Vomiting     Mushroom      fungus     Penicillins      As child. Nausea, feels ill.      Sulfa Drugs Rash     Long time ago      Social History     Tobacco Use     Smoking status: Former Smoker     Quit date:      Years since quittin.7     Smokeless tobacco: Never Used   Substance Use Topics     Alcohol use: Not Currently      Wt Readings from Last 1 Encounters:   10/04/21 73.9 kg (163 lb)        Anesthesia Evaluation   Pt has had prior anesthetic. Type: General.        ROS/MED HX  ENT/Pulmonary:     (+) asthma     Neurologic:  - neg neurologic ROS     Cardiovascular:  - neg cardiovascular ROS     METS/Exercise Tolerance:     Hematologic:  - neg hematologic  ROS     Musculoskeletal:  - neg musculoskeletal ROS     GI/Hepatic:  - neg GI/hepatic ROS     Renal/Genitourinary:  - neg Renal ROS     Endo:  - neg endo ROS     Psychiatric/Substance Use:  - neg psychiatric ROS     Infectious Disease:       Malignancy:   (+) Malignancy,     Other:            Physical Exam    Airway  airway exam normal           Respiratory Devices and Support         Dental  no notable dental history         Cardiovascular   cardiovascular exam normal          Pulmonary   pulmonary exam normal                OUTSIDE LABS:  CBC:   Lab Results   Component Value Date    WBC 4.8 2021    WBC 5.1  09/20/2021    HGB 12.1 09/22/2021    HGB 12.9 09/20/2021    HCT 37.5 09/22/2021    HCT 39.6 09/20/2021     09/22/2021     09/20/2021     BMP:   Lab Results   Component Value Date     09/20/2021    POTASSIUM 4.1 09/20/2021    CHLORIDE 110 (H) 09/20/2021    CO2 28 09/20/2021    BUN 15 09/20/2021    CR 0.71 09/20/2021    GLC 92 09/20/2021     COAGS:   Lab Results   Component Value Date    PTT 28 09/20/2021    INR 1.00 09/20/2021     POC:   Lab Results   Component Value Date    HCGS Negative 09/20/2021     HEPATIC:   Lab Results   Component Value Date    ALBUMIN 3.6 09/20/2021    PROTTOTAL 6.8 09/20/2021    ALT 20 09/20/2021    AST 14 09/20/2021    ALKPHOS 50 09/20/2021    BILITOTAL 0.3 09/20/2021     OTHER:   Lab Results   Component Value Date    BRUNA 9.2 09/20/2021    PHOS 3.0 09/20/2021    MAG 2.2 09/20/2021    TSH 2.79 09/24/2021       Anesthesia Plan    ASA Status:  2   NPO Status:  NPO Appropriate    Anesthesia Type: MAC.     - Reason for MAC: straight local not clinically adequate   Induction: Intravenous.           Consents    Anesthesia Plan(s) and associated risks, benefits, and realistic alternatives discussed. Questions answered and patient/representative(s) expressed understanding.     - Discussed with:  Patient         Postoperative Care    Pain management: Oral pain medications.   PONV prophylaxis: Ondansetron (or other 5HT-3), Dexamethasone or Solumedrol     Comments:         H&P reviewed: Unable to attach H&P to encounter due to EHR limitations. H&P Update: appropriate H&P reviewed, patient examined. No interval changes since H&P (within 30 days).         Jeff Richey MD, MD

## 2021-10-04 NOTE — NURSING NOTE
Neupogen 780mcg given in LLQ in clinic, pt tolerated. See MAR for details.  Penelope Maldonado CMA on 10/4/2021 at 10:27 AM

## 2021-10-04 NOTE — NURSING NOTE
Chief Complaint   Patient presents with     Oncology Clinic Visit     multiple myeloma     Blood Draw     labs drawn by RN in lab. VS taken.     CVC accessed, labs drawn. Line flushed and Heparin locked. Vital signs taken. Checked into next appointment.     Marly Camarillo RN

## 2021-10-04 NOTE — LETTER
10/4/2021         RE: Mil Seals  [address]  [place] [facility] [address]        Dear Colleague,    Thank you for referring your patient, Mil Seals, to the [facility] BLOOD AND MARROW TRANSPLANT PROGRAM [place]. Please see a copy of my visit note below.    [facility] BMT [facility]    ID: 60 yo F, MM, here for 4th dose of GCSF prior to stem cell collection.    Interim hx: Line placement went okay. Using claritin and tylenol for GCSF pain. Also noticing intake down and some nausea with GCSF.     A 10 point review of systems was negative other than noted above.       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 RT to sternum/rib 3990Gy 7/20/20-8/7/20[place]  2. Janey/VRd since 5/11/21 for ~5 cycles, CR, last cycle finished 9/7 (C6D15)    PMH: Asthma, PTSD  Allergy: sulfa (remote; willing to try again), pcn (n/v; ok to try cefepime in needed)    Ph/E:   Blood pressure 110/59, pulse 87, temperature 97.6  F (36.4  C), temperature source Oral, weight 73.9 kg (162 lb 14.7 oz), SpO2 98 %.      General: NAD; H&N: no mucosal lesions; Lungs clear; Heart RRR; Extremities: No edema; Skin: No rash; Neuro: Nonfocal; Mood/Affect: appropriate. New R chest wall CVC intact.    A&P:   1. 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   - Day 4 gcsf today.   - Line placed  - COVID neg  - CD34 looks good at 20 today. She will collect tomorrow. Apheresis aware and I notified Mil no mozobil needed today.  - Ok to proceed w/ GCSF; not sexually active, LMP ~10 years ago.     2.  Endo:   thyroid US with multiple nodules and 1 among them that meets criteria for Bx. Since most thyroid cancers that arise in the context of  multinodular thyroid are slow growing and this nodule was diagnosed incidentally, will proceed with transplant and biopsy this at count recovery, day 28 auto. Dr. Joyner notified the patient by phone and notified her BMT coordinator by email to schedule the biopsy.     RTC: Daily. Start collections tomorrow.     30 minutes spent on the date of the encounter doing chart review, history and exam, documentation and further activities per the note  {    Sondra Valerio              Again, thank you for allowing me to participate in the care of your patient.        Sincerely,        BMT Advanced Practice Provider

## 2021-10-05 ENCOUNTER — DOCUMENTATION ONLY (OUTPATIENT)
Dept: TRANSPLANT | Facility: CLINIC | Age: 61
End: 2021-10-05

## 2021-10-05 ENCOUNTER — ONCOLOGY VISIT (OUTPATIENT)
Dept: TRANSPLANT | Facility: CLINIC | Age: 61
End: 2021-10-05
Attending: PHYSICIAN ASSISTANT
Payer: MEDICAID

## 2021-10-05 ENCOUNTER — TELEPHONE (OUTPATIENT)
Dept: TRANSPLANT | Facility: CLINIC | Age: 61
End: 2021-10-05

## 2021-10-05 ENCOUNTER — HOSPITAL ENCOUNTER (OUTPATIENT)
Dept: LAB | Facility: CLINIC | Age: 61
End: 2021-10-05
Attending: INTERNAL MEDICINE
Payer: MEDICAID

## 2021-10-05 VITALS
WEIGHT: 161.82 LBS | SYSTOLIC BLOOD PRESSURE: 147 MMHG | RESPIRATION RATE: 18 BRPM | HEART RATE: 105 BPM | BODY MASS INDEX: 25.34 KG/M2 | DIASTOLIC BLOOD PRESSURE: 80 MMHG | TEMPERATURE: 99.6 F

## 2021-10-05 DIAGNOSIS — C90.00 MULTIPLE MYELOMA, REMISSION STATUS UNSPECIFIED (H): Primary | ICD-10-CM

## 2021-10-05 DIAGNOSIS — J45.909 MODERATE ASTHMA WITHOUT COMPLICATION, UNSPECIFIED WHETHER PERSISTENT: ICD-10-CM

## 2021-10-05 DIAGNOSIS — Z79.899 ENCOUNTER FOR MONITORING CARDIOTOXIC DRUG THERAPY: ICD-10-CM

## 2021-10-05 DIAGNOSIS — Z51.81 ENCOUNTER FOR MONITORING CARDIOTOXIC DRUG THERAPY: ICD-10-CM

## 2021-10-05 LAB
ANION GAP SERPL CALCULATED.3IONS-SCNC: 5 MMOL/L (ref 3–14)
BASOPHILS # BLD MANUAL: 0.3 10E3/UL (ref 0–0.2)
BASOPHILS NFR BLD MANUAL: 1 %
BUN SERPL-MCNC: 12 MG/DL (ref 7–30)
BURR CELLS BLD QL SMEAR: SLIGHT
CALCIUM SERPL-MCNC: 7.8 MG/DL (ref 8.5–10.1)
CD34 ABSOLUTE COUNT COMMENT: NORMAL
CD34 CELLS # SPEC: 37 CELLS/UL
CD34 CELLS NFR SPEC: 0.15 %
CHLORIDE BLD-SCNC: 110 MMOL/L (ref 94–109)
CO2 SERPL-SCNC: 28 MMOL/L (ref 20–32)
CREAT SERPL-MCNC: 0.75 MG/DL (ref 0.52–1.04)
EOSINOPHIL # BLD MANUAL: 0 10E3/UL (ref 0–0.7)
EOSINOPHIL NFR BLD MANUAL: 0 %
ERYTHROCYTE [DISTWIDTH] IN BLOOD BY AUTOMATED COUNT: 15.1 % (ref 10–15)
FRAGMENTS BLD QL SMEAR: SLIGHT
GFR SERPL CREATININE-BSD FRML MDRD: 86 ML/MIN/1.73M2
GLUCOSE BLD-MCNC: 102 MG/DL (ref 70–99)
HCT VFR BLD AUTO: 31.9 % (ref 35–47)
HGB BLD-MCNC: 10.6 G/DL (ref 11.7–15.7)
LYMPHOCYTES # BLD MANUAL: 2.5 10E3/UL (ref 0.8–5.3)
LYMPHOCYTES NFR BLD MANUAL: 10 %
MAGNESIUM SERPL-MCNC: 1.9 MG/DL (ref 1.6–2.3)
MCH RBC QN AUTO: 30 PG (ref 26.5–33)
MCHC RBC AUTO-ENTMCNC: 33.2 G/DL (ref 31.5–36.5)
MCV RBC AUTO: 90 FL (ref 78–100)
METAMYELOCYTES # BLD MANUAL: 0.3 10E3/UL
METAMYELOCYTES NFR BLD MANUAL: 1 %
MONOCYTES # BLD MANUAL: 1 10E3/UL (ref 0–1.3)
MONOCYTES NFR BLD MANUAL: 4 %
MYELOCYTES # BLD MANUAL: 0.3 10E3/UL
MYELOCYTES NFR BLD MANUAL: 1 %
NEUTROPHILS # BLD MANUAL: 20.3 10E3/UL (ref 1.6–8.3)
NEUTROPHILS NFR BLD MANUAL: 81 %
PLAT MORPH BLD: ABNORMAL
PLATELET # BLD AUTO: 168 10E3/UL (ref 150–450)
POTASSIUM BLD-SCNC: 3.5 MMOL/L (ref 3.4–5.3)
PRODUCT NUMBER FLOW CYTOMETRY: NORMAL
PROMYELOCYTES # BLD MANUAL: 0.5 10E3/UL
PROMYELOCYTES NFR BLD MANUAL: 2 %
RBC # BLD AUTO: 3.53 10E6/UL (ref 3.8–5.2)
RBC MORPH BLD: ABNORMAL
SODIUM SERPL-SCNC: 143 MMOL/L (ref 133–144)
VIABLE CD34 CELLS NFR FLD: 98.93 %
WBC # BLD AUTO: 25 10E3/UL (ref 4–11)

## 2021-10-05 PROCEDURE — 85027 COMPLETE CBC AUTOMATED: CPT

## 2021-10-05 PROCEDURE — 36592 COLLECT BLOOD FROM PICC: CPT

## 2021-10-05 PROCEDURE — 86367 STEM CELLS TOTAL COUNT: CPT

## 2021-10-05 PROCEDURE — 80048 BASIC METABOLIC PNL TOTAL CA: CPT

## 2021-10-05 PROCEDURE — 99214 OFFICE O/P EST MOD 30 MIN: CPT

## 2021-10-05 PROCEDURE — 250N000011 HC RX IP 250 OP 636: Performed by: PHYSICIAN ASSISTANT

## 2021-10-05 PROCEDURE — 38207 CRYOPRESERVE STEM CELLS: CPT

## 2021-10-05 PROCEDURE — 96372 THER/PROPH/DIAG INJ SC/IM: CPT | Performed by: PHYSICIAN ASSISTANT

## 2021-10-05 PROCEDURE — 38206 HARVEST AUTO STEM CELLS: CPT

## 2021-10-05 PROCEDURE — 250N000013 HC RX MED GY IP 250 OP 250 PS 637: Performed by: PHYSICIAN ASSISTANT

## 2021-10-05 PROCEDURE — 250N000011 HC RX IP 250 OP 636: Performed by: STUDENT IN AN ORGANIZED HEALTH CARE EDUCATION/TRAINING PROGRAM

## 2021-10-05 PROCEDURE — 83735 ASSAY OF MAGNESIUM: CPT

## 2021-10-05 PROCEDURE — 250N000009 HC RX 250: Performed by: STUDENT IN AN ORGANIZED HEALTH CARE EDUCATION/TRAINING PROGRAM

## 2021-10-05 RX ORDER — ONDANSETRON 8 MG/1
8 TABLET, ORALLY DISINTEGRATING ORAL EVERY 8 HOURS PRN
Qty: 40 TABLET | Refills: 0 | Status: SHIPPED | OUTPATIENT
Start: 2021-10-05 | End: 2021-10-25

## 2021-10-05 RX ORDER — HEPARIN SODIUM (PORCINE) LOCK FLUSH IV SOLN 100 UNIT/ML 100 UNIT/ML
3 SOLUTION INTRAVENOUS ONCE
Status: CANCELLED | OUTPATIENT
Start: 2021-10-05 | End: 2021-10-05

## 2021-10-05 RX ORDER — OXYCODONE HYDROCHLORIDE 5 MG/1
5 TABLET ORAL
Status: DISCONTINUED | OUTPATIENT
Start: 2021-10-05 | End: 2021-10-05 | Stop reason: HOSPADM

## 2021-10-05 RX ORDER — HEPARIN SODIUM (PORCINE) LOCK FLUSH IV SOLN 100 UNIT/ML 100 UNIT/ML
3 SOLUTION INTRAVENOUS ONCE
Status: COMPLETED | OUTPATIENT
Start: 2021-10-05 | End: 2021-10-05

## 2021-10-05 RX ORDER — OXYCODONE HYDROCHLORIDE 5 MG/1
5 TABLET ORAL EVERY 6 HOURS PRN
Qty: 6 TABLET | Refills: 0 | Status: SHIPPED | OUTPATIENT
Start: 2021-10-05 | End: 2021-10-08

## 2021-10-05 RX ORDER — ONDANSETRON 8 MG/1
8 TABLET, ORALLY DISINTEGRATING ORAL ONCE
Status: COMPLETED | OUTPATIENT
Start: 2021-10-05 | End: 2021-10-05

## 2021-10-05 RX ADMIN — OXYCODONE HYDROCHLORIDE 5 MG: 5 TABLET ORAL at 08:48

## 2021-10-05 RX ADMIN — Medication 3 ML: at 13:52

## 2021-10-05 RX ADMIN — ONDANSETRON 8 MG: 8 TABLET, ORALLY DISINTEGRATING ORAL at 08:21

## 2021-10-05 RX ADMIN — ANTICOAGULANT CITRATE DEXTROSE SOLUTION FORMULA A 1379 ML: 12.25; 11; 3.65 SOLUTION INTRAVENOUS at 08:49

## 2021-10-05 RX ADMIN — FILGRASTIM 780 MCG: 480 INJECTION, SOLUTION INTRAVENOUS; SUBCUTANEOUS at 08:36

## 2021-10-05 RX ADMIN — CALCIUM GLUCONATE 1478 MG/HR: 98 INJECTION, SOLUTION INTRAVENOUS at 09:01

## 2021-10-05 NOTE — PROGRESS NOTES
Pt is to continue with daily GCSF stem cell mobilization per protocol her at the [facility]. Current dosing is unsafe to administer at home. She needs dosing timed appropriately prior to initiating apheresis each am. Not medically appropriate or safe to administer at home.      Sondra Valerio PA-C  [phone]

## 2021-10-05 NOTE — LETTER
10/5/2021         RE: Mil Seals  [address]  [place] [facility] [address]        Dear Colleague,    Thank you for referring your patient, Mil Seals, to the [facility] BLOOD AND MARROW TRANSPLANT PROGRAM [place]. Please see a copy of my visit note below.    [facility]MT [facility]    ID: 62 yo F, MM, here for first day of stem cell collection.    Interim hx: She has nausea and bone pain today.  She was taking 1.5 g of tylenol at a time.  I have advised against that high of a dose.  Using claritin.  She has pain in pelvis, long bones and even toes.  Did not sleep well because of it.  This AM she has nausea, better with zofran   PO intake is poor.    A 10 point review of systems was negative other than noted above.       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 RT to sternum/rib 3990Gy 7/20/20-8/7/20, [place]  2. Janey/VRd since 5/11/21 for ~5 cycles, CR, last cycle finished 9/7 (C6D15)    PMH: Asthma, PTSD  Allergy: sulfa (remote; willing to try again), pcn (n/v; ok to try cefepime in needed)    Ph/E:     General: NAD; H&N: no mucosal lesions; Lungs clear; Heart RRR; Extremities: No edema; Skin: No rash; Neuro: Nonfocal; Mood/Affect: appropriate. New R chest wall CVC intact.  Some erythema at insertion and exit site.  Tunnel is not tender.    A&P:   1. 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     - CD34 yesterday 20/uL; up to 37/uL today. First day of collection today.      - Reviewed with pt that we are collecting for TWO Transplants * note that there was some discrepancy regarding this, see rationale below.    - Despite her High risk MM (del17p, p53 mutation, t(4;14), t(14;16), t(14;20),  abnormal chromosome 1, -13 [by metaphase testing], or plasma cell leukemia), We will collect enough CD34 cells for 2 transplants given her young age and presentation., and the collection goal is 8 million CD34/kg.  The day for admission to begin melphalan conditioning is Day -1 for melphalan 200 mg/m2 (not frail, creatinine clearance 30+).     CI score: 4 (FEV1%pred, depression).        2.  Endo:   thyroid US with multiple nodules and 1 among them that meets criteria for Bx. Since most thyroid cancers that arise in the context of multinodular thyroid are slow growing and this nodule was diagnosed incidentally, will proceed with transplant and biopsy this at count recovery, day 28 auto. Dr. Joyner notified the patient by phone and notified her BMT coordinator by email to schedule the biopsy.     RTC: Daily. RTC tomorrow for f/u first day of collections and possible second day of collection    60 minutes spent on the date of the encounter doing chart review, history and exam, documentation and further activities per the note, discussion with pt, BMT close physician, BMT nurse coordinator/BMT office, further discussion with pt and apheresis RN regarding target CD 34 goal for 2 transpalnts.  {    Pattie Lyn            Pt is to continue with daily GCSF stem cell mobilization per protocol her at the [facility]. Current dosing is unsafe to administer at home. She needs dosing timed appropriately prior to initiating apheresis each am. Not medically appropriate or safe to administer at home.      Sondra Valerio PA-C  [phone]      Again, thank you for allowing me to participate in the care of your patient.        Sincerely,        BMT Advanced Practice Provider

## 2021-10-05 NOTE — DISCHARGE INSTRUCTIONS
Apheresis Blood Donor Center Post Instructions  You may feel tired after your procedure today.   Please call your doctor if you have:  bleeding that doesn t stop, fever, pain where a needle or tube (catheter) was placed, seizures, trouble breathing, red urine, nausea or vomiting, other health concerns.     If your symptoms are severe, call [phone].  If your veins were used, keep the bandages on for 2-4 hours.  Avoid heavy lifting with your arms.  If bleeding occurs from these sites, apply firm pressure for 5-10 minutes.  Call your physician if bleeding continues.    If you have a Central Venous Catheter:  Notify your doctor if you have had a fever, chills, shaking  or redness, warmth, swelling, drainage at the exit-site.  This could be a sign of infection.    If we used your fistula or graft, watch for signs of bleeding.  Please remove the bandages after 4 hours.  The Apheresis/Blood Donor Center is open Monday-Friday 7:30 a.m. to 5 p.m.  The phone number is [phone].  A Transfusion Medicine physician can be reached after 5:00 p.m. weekdays and on weekends /Holidays by calling [phone], and asking for the physician on call.      Autologous HPC/MNC Collection:   In most cases, the cell dose report will be available tomorrow morning.   The Bone Marrow Transplant (BMT) clinic staff looks at your report, and a decision is made if you will need another collection.   Remember it is important to follow a low fat diet during the collection process. Sometimes following the procedure, your blood platelet count may be low.  If you are told your platelet count is low, you need to avoid taking aspirin/aspirin containing products and avoid heavy physical activity and activities that may result in bruising or traumatic injury.  To contact the BMT fellow or attending physician after 5 p.m. call [phone].
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
## 2021-10-06 ENCOUNTER — ONCOLOGY VISIT (OUTPATIENT)
Dept: TRANSPLANT | Facility: CLINIC | Age: 61
End: 2021-10-06
Attending: PHYSICIAN ASSISTANT
Payer: MEDICAID

## 2021-10-06 ENCOUNTER — HOSPITAL ENCOUNTER (OUTPATIENT)
Dept: LAB | Facility: CLINIC | Age: 61
End: 2021-10-06
Attending: INTERNAL MEDICINE
Payer: MEDICAID

## 2021-10-06 ENCOUNTER — TELEPHONE (OUTPATIENT)
Dept: TRANSPLANT | Facility: CLINIC | Age: 61
End: 2021-10-06

## 2021-10-06 VITALS
DIASTOLIC BLOOD PRESSURE: 62 MMHG | HEART RATE: 103 BPM | TEMPERATURE: 99 F | SYSTOLIC BLOOD PRESSURE: 123 MMHG | RESPIRATION RATE: 18 BRPM

## 2021-10-06 DIAGNOSIS — Z52.001 STEM CELL DONOR: Primary | ICD-10-CM

## 2021-10-06 DIAGNOSIS — C90.00 MULTIPLE MYELOMA, REMISSION STATUS UNSPECIFIED (H): Primary | ICD-10-CM

## 2021-10-06 DIAGNOSIS — C90.00 MULTIPLE MYELOMA, REMISSION STATUS UNSPECIFIED (H): ICD-10-CM

## 2021-10-06 DIAGNOSIS — Z52.001 STEM CELL DONOR: ICD-10-CM

## 2021-10-06 LAB
ALBUMIN SERPL-MCNC: 3.2 G/DL (ref 3.4–5)
ALP SERPL-CCNC: 240 U/L (ref 40–150)
ALT SERPL W P-5'-P-CCNC: 33 U/L (ref 0–50)
ANION GAP SERPL CALCULATED.3IONS-SCNC: 9 MMOL/L (ref 3–14)
AST SERPL W P-5'-P-CCNC: 37 U/L (ref 0–45)
BASOPHILS # BLD MANUAL: 0 10E3/UL (ref 0–0.2)
BASOPHILS NFR BLD MANUAL: 0 %
BILIRUB SERPL-MCNC: 0.4 MG/DL (ref 0.2–1.3)
BUN SERPL-MCNC: 8 MG/DL (ref 7–30)
CALCIUM SERPL-MCNC: 8.6 MG/DL (ref 8.5–10.1)
CD34 ABSOLUTE COUNT COMMENT: NORMAL
CD34 CELLS # SPEC: 18 CELLS/UL
CD34 CELLS NFR SPEC: 0.06 %
CHLORIDE BLD-SCNC: 106 MMOL/L (ref 94–109)
CO2 SERPL-SCNC: 28 MMOL/L (ref 20–32)
CREAT SERPL-MCNC: 0.88 MG/DL (ref 0.52–1.04)
EOSINOPHIL # BLD MANUAL: 0.6 10E3/UL (ref 0–0.7)
EOSINOPHIL NFR BLD MANUAL: 2 %
ERYTHROCYTE [DISTWIDTH] IN BLOOD BY AUTOMATED COUNT: 15.1 % (ref 10–15)
GFR SERPL CREATININE-BSD FRML MDRD: 71 ML/MIN/1.73M2
GLUCOSE BLD-MCNC: 105 MG/DL (ref 70–99)
HCT VFR BLD AUTO: 35.2 % (ref 35–47)
HGB BLD-MCNC: 11.7 G/DL (ref 11.7–15.7)
LYMPHOCYTES # BLD MANUAL: 2.6 10E3/UL (ref 0.8–5.3)
LYMPHOCYTES NFR BLD MANUAL: 9 %
MAGNESIUM SERPL-MCNC: 1.6 MG/DL (ref 1.6–2.3)
MCH RBC QN AUTO: 29.8 PG (ref 26.5–33)
MCHC RBC AUTO-ENTMCNC: 33.2 G/DL (ref 31.5–36.5)
MCV RBC AUTO: 90 FL (ref 78–100)
MONOCYTES # BLD MANUAL: 1.1 10E3/UL (ref 0–1.3)
MONOCYTES NFR BLD MANUAL: 4 %
MYELOCYTES # BLD MANUAL: 0.3 10E3/UL
MYELOCYTES NFR BLD MANUAL: 1 %
NEUTROPHILS # BLD MANUAL: 24 10E3/UL (ref 1.6–8.3)
NEUTROPHILS NFR BLD MANUAL: 84 %
PLAT MORPH BLD: ABNORMAL
PLATELET # BLD AUTO: 136 10E3/UL (ref 150–450)
POTASSIUM BLD-SCNC: 3.3 MMOL/L (ref 3.4–5.3)
PRODUCT NUMBER FLOW CYTOMETRY: NORMAL
PROT SERPL-MCNC: 6 G/DL (ref 6.8–8.8)
RBC # BLD AUTO: 3.93 10E6/UL (ref 3.8–5.2)
RBC MORPH BLD: ABNORMAL
SODIUM SERPL-SCNC: 143 MMOL/L (ref 133–144)
VIABLE CD34 CELLS NFR FLD: 96.79 %
WBC # BLD AUTO: 28.6 10E3/UL (ref 4–11)

## 2021-10-06 PROCEDURE — 250N000011 HC RX IP 250 OP 636: Performed by: PHYSICIAN ASSISTANT

## 2021-10-06 PROCEDURE — 80053 COMPREHEN METABOLIC PANEL: CPT

## 2021-10-06 PROCEDURE — 96372 THER/PROPH/DIAG INJ SC/IM: CPT | Mod: XS | Performed by: PHYSICIAN ASSISTANT

## 2021-10-06 PROCEDURE — 96365 THER/PROPH/DIAG IV INF INIT: CPT | Mod: XS

## 2021-10-06 PROCEDURE — 99214 OFFICE O/P EST MOD 30 MIN: CPT | Mod: 25

## 2021-10-06 PROCEDURE — 250N000009 HC RX 250: Performed by: STUDENT IN AN ORGANIZED HEALTH CARE EDUCATION/TRAINING PROGRAM

## 2021-10-06 PROCEDURE — 250N000011 HC RX IP 250 OP 636: Mod: JW | Performed by: PHYSICIAN ASSISTANT

## 2021-10-06 PROCEDURE — 36592 COLLECT BLOOD FROM PICC: CPT

## 2021-10-06 PROCEDURE — 96372 THER/PROPH/DIAG INJ SC/IM: CPT | Performed by: PHYSICIAN ASSISTANT

## 2021-10-06 PROCEDURE — 86367 STEM CELLS TOTAL COUNT: CPT

## 2021-10-06 PROCEDURE — 38207 CRYOPRESERVE STEM CELLS: CPT

## 2021-10-06 PROCEDURE — 96365 THER/PROPH/DIAG IV INF INIT: CPT

## 2021-10-06 PROCEDURE — 85027 COMPLETE CBC AUTOMATED: CPT

## 2021-10-06 PROCEDURE — 83735 ASSAY OF MAGNESIUM: CPT | Performed by: STUDENT IN AN ORGANIZED HEALTH CARE EDUCATION/TRAINING PROGRAM

## 2021-10-06 PROCEDURE — 38206 HARVEST AUTO STEM CELLS: CPT

## 2021-10-06 PROCEDURE — 96366 THER/PROPH/DIAG IV INF ADDON: CPT | Mod: XS

## 2021-10-06 PROCEDURE — 250N000011 HC RX IP 250 OP 636: Performed by: STUDENT IN AN ORGANIZED HEALTH CARE EDUCATION/TRAINING PROGRAM

## 2021-10-06 RX ORDER — PLERIXAFOR 24 MG/1.2ML
0.24 SOLUTION SUBCUTANEOUS DAILY
Status: CANCELLED
Start: 2021-10-07

## 2021-10-06 RX ORDER — POTASSIUM CHLORIDE 29.8 MG/ML
20 INJECTION INTRAVENOUS ONCE
Status: CANCELLED | OUTPATIENT
Start: 2021-10-06 | End: 2021-10-06

## 2021-10-06 RX ORDER — HEPARIN SODIUM (PORCINE) LOCK FLUSH IV SOLN 100 UNIT/ML 100 UNIT/ML
3 SOLUTION INTRAVENOUS ONCE
Status: CANCELLED | OUTPATIENT
Start: 2021-10-06 | End: 2021-10-06

## 2021-10-06 RX ORDER — HEPARIN SODIUM (PORCINE) LOCK FLUSH IV SOLN 100 UNIT/ML 100 UNIT/ML
3 SOLUTION INTRAVENOUS ONCE
Status: COMPLETED | OUTPATIENT
Start: 2021-10-06 | End: 2021-10-06

## 2021-10-06 RX ORDER — POTASSIUM CHLORIDE 29.8 MG/ML
20 INJECTION INTRAVENOUS ONCE
Status: COMPLETED | OUTPATIENT
Start: 2021-10-06 | End: 2021-10-06

## 2021-10-06 RX ORDER — PLERIXAFOR 24 MG/1.2ML
0.24 SOLUTION SUBCUTANEOUS DAILY
Status: CANCELLED
Start: 2021-10-06

## 2021-10-06 RX ORDER — PLERIXAFOR 24 MG/1.2ML
0.24 SOLUTION SUBCUTANEOUS DAILY
Status: DISCONTINUED | OUTPATIENT
Start: 2021-10-06 | End: 2021-10-06 | Stop reason: HOSPADM

## 2021-10-06 RX ADMIN — FILGRASTIM 780 MCG: 480 INJECTION, SOLUTION INTRAVENOUS; SUBCUTANEOUS at 09:50

## 2021-10-06 RX ADMIN — ANTICOAGULANT CITRATE DEXTROSE SOLUTION FORMULA A 1472 ML: 12.25; 11; 3.65 SOLUTION INTRAVENOUS at 09:41

## 2021-10-06 RX ADMIN — POTASSIUM CHLORIDE 20 MEQ: 400 INJECTION, SOLUTION INTRAVENOUS at 11:35

## 2021-10-06 RX ADMIN — Medication 3 ML: at 14:53

## 2021-10-06 RX ADMIN — CALCIUM GLUCONATE 1468 MG/HR: 98 INJECTION, SOLUTION INTRAVENOUS at 09:41

## 2021-10-06 RX ADMIN — PLERIXAFOR 17.8 MG: 24 SOLUTION SUBCUTANEOUS at 15:42

## 2021-10-06 RX ADMIN — POTASSIUM CHLORIDE 20 MEQ: 400 INJECTION, SOLUTION INTRAVENOUS at 12:31

## 2021-10-06 NOTE — PROGRESS NOTES
[facility] BMT Clinic    ID: 60 yo F, MM, undergoing stem cell collections prior to auto PBSCT.     Interim hx: Nausea is much improved today with zofran. Bone pain is also much better with tylenol, claritin, and oxycodone. Having some night sweats and low grade temps of 99 with gcsf. No infectious symptoms. Occasional cough due to asthma. Appetite is low but a bit better than previously. Trying to supplement with shakes.     8 point review of systems was negative other than noted above.     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 RT to sternum/rib 3990Gy 7/20/20-8/7/20, [place]  2. Janey/VRd since 5/11/21 for ~5 cycles, CR, last cycle finished 9/7 (C6D15)    Ph/E:   General: NAD; H&N: no mucosal lesions  Lungs clear; Heart RRR  Extremities: No edema  Skin: No rash  Neuro: Nonfocal;   Acess: R chest wall CVC intact.      A&P:   1. 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   - despite high risk disease collecting for TWO Transplants, see rationale below.  - s/p 1 day of collections. Collected 3.3 x10(6) CD34/kg. Second day of collections today. CD34 down to 18.  Will give mozobil tonight and plan for third day of collections tomorrow.     - Despite her High risk MM (del17p, p53 mutation, t(4;14), t(14;16), t(14;20), abnormal chromosome 1, -13 [by metaphase testing], or plasma cell leukemia), We will collect enough CD34 cells for 2 transplants given her young age and presentation., and the collection goal is 8 million CD34/kg.  The day for admission to begin melphalan conditioning is Day -1 for melphalan 200 mg/m2 (not frail, creatinine clearance 30+).     CI score: 4 (FEV1%pred, depression).        2. Heme:  Leukocytosis secondary to gcsf.     3.  Endo:   thyroid US with multiple nodules and 1 among them that meets criteria for Bx. Since most thyroid cancers that arise in the context of multinodular thyroid are slow growing and this nodule was diagnosed incidentally, will proceed with transplant and biopsy this at count recovery, day 28 auto. Dr. Joyner notified the patient by phone and notified her BMT coordinator by email to schedule the biopsy.     4. Renal: Cr stable  - hypokalemia: likely due to apheresis and poor oral intake. Give 40 mEq IV 10/7 d/t ongoing nausea.     5. Bone pain: Claritin, oxycodone and tylenol prn.      6. Nausea: zofran and compazine prn with good relief.       RTC: tomorrow for possible third day collections. Did not request an infusion appointment for mozobil as pending CD34 may not need to get another dose tomorrow.     30 minutes spent on the date of the encounter doing chart review, history and exam, documentation and further activities per the note      Rai Sibley PA-C  [id number]

## 2021-10-06 NOTE — LETTER
10/6/2021         RE: Mil Seals  [address]  [facility] [address]        Dear Colleague,    Thank you for referring your patient, Mil Seals, to the [facility] BLOOD AND MARROW TRANSPLANT PROGRAM [place]. Please see a copy of my visit note below.    [facility] BMT Clinic    ID: 62 yo F, MM, undergoing stem cell collections prior to auto PBSCT.     Interim hx: Nausea is much improved today with zofran. Bone pain is also much better with tylenol, claritin, and oxycodone. Having some night sweats and low grade temps of 99 with gcsf. No infectious symptoms. Occasional cough due to asthma. Appetite is low but a bit better than previously. Trying to supplement with shakes.     8 point review of systems was negative other than noted above.     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 RT to sternum/rib 3990Gy 7/20/20-8/7/20, [place]  2. Janey/VRd since 5/11/21 for ~5 cycles, CR, last cycle finished 9/7 (C6D15)    Ph/E:   General: NAD; H&N: no mucosal lesions  Lungs clear; Heart RRR  Extremities: No edema  Skin: No rash  Neuro: Nonfocal;   Acess: R chest wall CVC intact.      A&P:   1. 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   - despite high risk disease collecting for TWO Transplants, see rationale below.  - s/p 1 day of collections. Collected 3.3 x10(6) CD34/kg. Second day of collections today. CD34 down to 18.  Will give mozobil tonight and plan for third day of collections tomorrow.     - Despite her High risk MM (del17p, p53 mutation, t(4;14), t(14;16), t(14;20), abnormal chromosome 1, -13 [by metaphase testing], or plasma cell leukemia), We will collect enough CD34 cells  for 2 transplants given her young age and presentation., and the collection goal is 8 million CD34/kg.  The day for admission to begin melphalan conditioning is Day -1 for melphalan 200 mg/m2 (not frail, creatinine clearance 30+).     CI score: 4 (FEV1%pred, depression).        2. Heme: Leukocytosis secondary to gcsf.     3.  Endo:   thyroid US with multiple nodules and 1 among them that meets criteria for Bx. Since most thyroid cancers that arise in the context of multinodular thyroid are slow growing and this nodule was diagnosed incidentally, will proceed with transplant and biopsy this at count recovery, day 28 auto. Dr. Joyner notified the patient by phone and notified her BMT coordinator by email to schedule the biopsy.     4. Renal: Cr stable  - hypokalemia: likely due to apheresis and poor oral intake. Give 40 mEq IV 10/7 d/t ongoing nausea.     5. Bone pain: Claritin, oxycodone and tylenol prn.      6. Nausea: zofran and compazine prn with good relief.       RTC: tomorrow for possible third day collections. Did not request an infusion appointment for mozobil as pending CD34 may not need to get another dose tomorrow.     30 minutes spent on the date of the encounter doing chart review, history and exam, documentation and further activities per the note      Rai Sibley PA-C  [id number]            Again, thank you for allowing me to participate in the care of your patient.        Sincerely,        BMT Advanced Practice Provider

## 2021-10-06 NOTE — DISCHARGE INSTRUCTIONS
Autologous HPC/MNC Collection:   In most cases, the cell dose report will be available tomorrow morning.   The Bone Marrow Transplant (BMT) clinic staff looks at your report, and a decision is made if you will need another collection.   Remember it is important to follow a low fat diet during the collection process. Sometimes following the procedure, your blood platelet count may be low.  If you are told your platelet count is low, you need to avoid taking aspirin/aspirin containing products and avoid heavy physical activity and activities that may result in bruising or traumatic injury.  To contact the BMT fellow or attending physician after 5 p.m. call [phone].    Apheresis Blood Donor Center Post Instructions  You may feel tired after your procedure today.   Please call your doctor if you have:  bleeding that doesn t stop, fever, pain where a needle or tube (catheter) was placed, seizures, trouble breathing, red urine, nausea or vomiting, other health concerns.     If your symptoms are severe, call [phone].  If your veins were used, keep the bandages on for 2-4 hours.  Avoid heavy lifting with your arms.  If bleeding occurs from these sites, apply firm pressure for 5-10 minutes.  Call your physician if bleeding continues.    If you have a Central Venous Catheter:  Notify your doctor if you have had a fever, chills, shaking  or redness, warmth, swelling, drainage at the exit-site.  This could be a sign of infection.    If we used your fistula or graft, watch for signs of bleeding.  Please remove the bandages after 4 hours.  The Apheresis/Blood Donor Center is open Monday-Friday 7:30 a.m. to 5 p.m.  The phone number is [phone].  A Transfusion Medicine physician can be reached after 5:00 p.m. weekdays and on weekends /Holidays by calling [phone], and asking for the physician on call.

## 2021-10-06 NOTE — PROGRESS NOTES
Infusion Nursing Note:  Mil Arayae Seals presents today for add-on injection.    Patient seen by provider today: Yes: Rai Sibley   present during visit today: Not Applicable.      Note:  Patient teaching regarding Mozobil was completed and Patient verbalized complete understanding.      Treatment Conditions:  Per Provider, Patient received an add on Mozobil injection in her lower right abdomen.       Post Infusion Assessment:  Patient waited in the infusion room for 30 minutes post injection.  Patient tolerated injection without incident.       Discharge Plan:   Patient discharged in stable condition accompanied by: self.      REDD MCFADDEN RN

## 2021-10-06 NOTE — TELEPHONE ENCOUNTER
BMT CLINICAL SOCIAL WORK NOTE:    Focus: Lodging    Data: Pt is a 61 year old female planning to come for an auto transplant, currently completing her apheresis.     Interventions: Clinical  (CSW) placed a call to [employer] [phone] to check status of he claim for her lodging reimbursement. Discussed with MT that all requested forms were sent on 9/28/21 and pt is already local for her apheresis and will need lodging established and reimbursed for 10/1/21-10/7/21 and then post BMT. They noted they do see the request started, but are waiting to get approval from her medical insurance. SW questioned why this was needed as her medical insurance has already approved BMT with our facility. SW requested that they request they expedite this request for the pt. SW re-faxed requested information and included insurance auth request.     SW updated the pt regarding this status    Plan: CSW will continue to work with Pt and family to provide supportive counseling and assist with resources as needed. CSW will continue to collaborate with multidisciplinary team regarding Pt's plan of care.     JEMIMA Lyn, [facility]  Pager: [phone]  Phone: [phone]     none

## 2021-10-06 NOTE — LETTER
10/6/2021         RE: Mil Seals  [address]  [facility] [address]        Dear Colleague,    Thank you for referring your patient, Mil Seals, to the [facility] BLOOD AND MARROW TRANSPLANT PROGRAM [place]. Please see a copy of my visit note below.    Infusion Nursing Note:  Mil Seals presents today for add-on injection.    Patient seen by provider today: Yes: Rai Sibley   present during visit today: Not Applicable.      Note:  Patient teaching regarding Mozobil was completed and Patient verbalized complete understanding.      Treatment Conditions:  Per Provider, Patient received an add on Mozobil injection in her lower right abdomen.       Post Infusion Assessment:  Patient waited in the infusion room for 30 minutes post injection.  Patient tolerated injection without incident.       Discharge Plan:   Patient discharged in stable condition accompanied by: self.      REDD MCFADDEN, NICKIE                          Again, thank you for allowing me to participate in the care of your patient.        Sincerely,        [facility]
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
## 2021-10-07 ENCOUNTER — TELEPHONE (OUTPATIENT)
Dept: TRANSPLANT | Facility: CLINIC | Age: 61
End: 2021-10-07

## 2021-10-07 ENCOUNTER — HOSPITAL ENCOUNTER (OUTPATIENT)
Dept: LAB | Facility: CLINIC | Age: 61
End: 2021-10-07
Attending: INTERNAL MEDICINE
Payer: MEDICAID

## 2021-10-07 ENCOUNTER — INFUSION THERAPY VISIT (OUTPATIENT)
Dept: TRANSPLANT | Facility: CLINIC | Age: 61
End: 2021-10-07
Payer: MEDICAID

## 2021-10-07 ENCOUNTER — ONCOLOGY VISIT (OUTPATIENT)
Dept: TRANSPLANT | Facility: CLINIC | Age: 61
End: 2021-10-07
Attending: PHYSICIAN ASSISTANT
Payer: MEDICAID

## 2021-10-07 VITALS
RESPIRATION RATE: 16 BRPM | DIASTOLIC BLOOD PRESSURE: 59 MMHG | HEART RATE: 107 BPM | SYSTOLIC BLOOD PRESSURE: 116 MMHG | TEMPERATURE: 99.3 F

## 2021-10-07 DIAGNOSIS — Z52.001 STEM CELL DONOR: Primary | ICD-10-CM

## 2021-10-07 DIAGNOSIS — Z52.001 STEM CELL DONOR: ICD-10-CM

## 2021-10-07 DIAGNOSIS — C90.00 MULTIPLE MYELOMA, REMISSION STATUS UNSPECIFIED (H): Primary | ICD-10-CM

## 2021-10-07 DIAGNOSIS — C90.00 MULTIPLE MYELOMA, REMISSION STATUS UNSPECIFIED (H): ICD-10-CM

## 2021-10-07 LAB
ANION GAP SERPL CALCULATED.3IONS-SCNC: 3 MMOL/L (ref 3–14)
BASOPHILS # BLD MANUAL: 0 10E3/UL (ref 0–0.2)
BASOPHILS NFR BLD MANUAL: 0 %
BUN SERPL-MCNC: 8 MG/DL (ref 7–30)
CALCIUM SERPL-MCNC: 8.4 MG/DL (ref 8.5–10.1)
CD34 ABSOLUTE COUNT COMMENT: NORMAL
CD34 CELLS # SPEC: 53 CELLS/UL
CD34 CELLS NFR SPEC: 0.09 %
CHLORIDE BLD-SCNC: 108 MMOL/L (ref 94–109)
CO2 SERPL-SCNC: 31 MMOL/L (ref 20–32)
CREAT SERPL-MCNC: 0.87 MG/DL (ref 0.52–1.04)
EOSINOPHIL # BLD MANUAL: 0 10E3/UL (ref 0–0.7)
EOSINOPHIL NFR BLD MANUAL: 0 %
ERYTHROCYTE [DISTWIDTH] IN BLOOD BY AUTOMATED COUNT: 15.3 % (ref 10–15)
GFR SERPL CREATININE-BSD FRML MDRD: 72 ML/MIN/1.73M2
GLUCOSE BLD-MCNC: 125 MG/DL (ref 70–99)
HCT VFR BLD AUTO: 30.7 % (ref 35–47)
HGB BLD-MCNC: 10.1 G/DL (ref 11.7–15.7)
LYMPHOCYTES # BLD MANUAL: 0.5 10E3/UL (ref 0.8–5.3)
LYMPHOCYTES NFR BLD MANUAL: 1 %
MAGNESIUM SERPL-MCNC: 1.3 MG/DL (ref 1.6–2.3)
MCH RBC QN AUTO: 30.1 PG (ref 26.5–33)
MCHC RBC AUTO-ENTMCNC: 32.9 G/DL (ref 31.5–36.5)
MCV RBC AUTO: 92 FL (ref 78–100)
MONOCYTES # BLD MANUAL: 1.6 10E3/UL (ref 0–1.3)
MONOCYTES NFR BLD MANUAL: 3 %
MYELOCYTES # BLD MANUAL: 1.1 10E3/UL
MYELOCYTES NFR BLD MANUAL: 2 %
NEUTROPHILS # BLD MANUAL: 49.7 10E3/UL (ref 1.6–8.3)
NEUTROPHILS NFR BLD MANUAL: 94 %
PLAT MORPH BLD: ABNORMAL
PLATELET # BLD AUTO: 80 10E3/UL (ref 150–450)
POTASSIUM BLD-SCNC: 3.2 MMOL/L (ref 3.4–5.3)
PRODUCT NUMBER FLOW CYTOMETRY: NORMAL
RBC # BLD AUTO: 3.35 10E6/UL (ref 3.8–5.2)
RBC MORPH BLD: ABNORMAL
SODIUM SERPL-SCNC: 142 MMOL/L (ref 133–144)
VIABLE CD34 CELLS NFR FLD: 98.04 %
WBC # BLD AUTO: 52.9 10E3/UL (ref 4–11)

## 2021-10-07 PROCEDURE — 96366 THER/PROPH/DIAG IV INF ADDON: CPT

## 2021-10-07 PROCEDURE — 250N000011 HC RX IP 250 OP 636: Performed by: PHYSICIAN ASSISTANT

## 2021-10-07 PROCEDURE — 36592 COLLECT BLOOD FROM PICC: CPT

## 2021-10-07 PROCEDURE — 250N000011 HC RX IP 250 OP 636: Performed by: STUDENT IN AN ORGANIZED HEALTH CARE EDUCATION/TRAINING PROGRAM

## 2021-10-07 PROCEDURE — 38207 CRYOPRESERVE STEM CELLS: CPT

## 2021-10-07 PROCEDURE — 38206 HARVEST AUTO STEM CELLS: CPT

## 2021-10-07 PROCEDURE — 80048 BASIC METABOLIC PNL TOTAL CA: CPT

## 2021-10-07 PROCEDURE — 85027 COMPLETE CBC AUTOMATED: CPT

## 2021-10-07 PROCEDURE — 96367 TX/PROPH/DG ADDL SEQ IV INF: CPT | Mod: 59

## 2021-10-07 PROCEDURE — 99214 OFFICE O/P EST MOD 30 MIN: CPT

## 2021-10-07 PROCEDURE — 250N000009 HC RX 250: Performed by: STUDENT IN AN ORGANIZED HEALTH CARE EDUCATION/TRAINING PROGRAM

## 2021-10-07 PROCEDURE — 86367 STEM CELLS TOTAL COUNT: CPT

## 2021-10-07 PROCEDURE — 96365 THER/PROPH/DIAG IV INF INIT: CPT

## 2021-10-07 PROCEDURE — 96365 THER/PROPH/DIAG IV INF INIT: CPT | Mod: 59

## 2021-10-07 PROCEDURE — 96372 THER/PROPH/DIAG INJ SC/IM: CPT | Performed by: PHYSICIAN ASSISTANT

## 2021-10-07 PROCEDURE — 83735 ASSAY OF MAGNESIUM: CPT

## 2021-10-07 RX ORDER — POTASSIUM CHLORIDE 29.8 MG/ML
20 INJECTION INTRAVENOUS ONCE
Status: COMPLETED | OUTPATIENT
Start: 2021-10-07 | End: 2021-10-07

## 2021-10-07 RX ORDER — MAGNESIUM SULFATE HEPTAHYDRATE 40 MG/ML
2 INJECTION, SOLUTION INTRAVENOUS ONCE
Status: COMPLETED | OUTPATIENT
Start: 2021-10-07 | End: 2021-10-07

## 2021-10-07 RX ORDER — POTASSIUM CHLORIDE 1500 MG/1
40 TABLET, EXTENDED RELEASE ORAL ONCE
Status: CANCELLED | OUTPATIENT
Start: 2021-10-07

## 2021-10-07 RX ORDER — POTASSIUM CHLORIDE 7.45 MG/ML
10 INJECTION INTRAVENOUS
Status: DISCONTINUED | OUTPATIENT
Start: 2021-10-07 | End: 2021-10-07 | Stop reason: CLARIF

## 2021-10-07 RX ORDER — PLERIXAFOR 24 MG/1.2ML
0.24 SOLUTION SUBCUTANEOUS DAILY
Status: CANCELLED
Start: 2021-10-08

## 2021-10-07 RX ORDER — HEPARIN SODIUM (PORCINE) LOCK FLUSH IV SOLN 100 UNIT/ML 100 UNIT/ML
3 SOLUTION INTRAVENOUS ONCE
Status: COMPLETED | OUTPATIENT
Start: 2021-10-07 | End: 2021-10-07

## 2021-10-07 RX ORDER — MAGNESIUM SULFATE HEPTAHYDRATE 40 MG/ML
2 INJECTION, SOLUTION INTRAVENOUS ONCE
Status: CANCELLED | OUTPATIENT
Start: 2021-10-07

## 2021-10-07 RX ORDER — HEPARIN SODIUM (PORCINE) LOCK FLUSH IV SOLN 100 UNIT/ML 100 UNIT/ML
3 SOLUTION INTRAVENOUS ONCE
Status: CANCELLED | OUTPATIENT
Start: 2021-10-07 | End: 2021-10-07

## 2021-10-07 RX ORDER — PLERIXAFOR 24 MG/1.2ML
0.24 SOLUTION SUBCUTANEOUS DAILY
Status: DISCONTINUED | OUTPATIENT
Start: 2021-10-07 | End: 2021-10-07 | Stop reason: HOSPADM

## 2021-10-07 RX ADMIN — Medication 3 ML: at 14:25

## 2021-10-07 RX ADMIN — POTASSIUM CHLORIDE 20 MEQ: 400 INJECTION, SOLUTION INTRAVENOUS at 12:18

## 2021-10-07 RX ADMIN — CALCIUM GLUCONATE 1468 MG/HR: 94 INJECTION, SOLUTION INTRAVENOUS at 08:45

## 2021-10-07 RX ADMIN — FILGRASTIM 780 MCG: 480 INJECTION, SOLUTION INTRAVENOUS; SUBCUTANEOUS at 08:57

## 2021-10-07 RX ADMIN — MAGNESIUM SULFATE IN WATER 2 G: 40 INJECTION, SOLUTION INTRAVENOUS at 13:15

## 2021-10-07 RX ADMIN — PLERIXAFOR 17.8 MG: 24 SOLUTION SUBCUTANEOUS at 15:33

## 2021-10-07 RX ADMIN — ANTICOAGULANT CITRATE DEXTROSE SOLUTION FORMULA A 1441 ML: 12.25; 11; 3.65 SOLUTION INTRAVENOUS at 08:45

## 2021-10-07 RX ADMIN — POTASSIUM CHLORIDE 20 MEQ: 400 INJECTION, SOLUTION INTRAVENOUS at 11:19

## 2021-10-07 NOTE — PROGRESS NOTES
Infusion Nursing Note:  Mil Seals presents today for add- on injection.    Patient seen by provider today: Yes: Pattie Lyn PA-C   present during visit today: Not Applicable.    Note: Patient received Mozobil 17.8mcg subcutaneous injection to left lower abdomen.      Intravenous Access:  No Intravenous access/labs at this visit.    Treatment Conditions:  Not Applicable.      Post Infusion Assessment:  Patient tolerated injection without incident.       Discharge Plan:   Patient discharged in stable condition accompanied by: self.  Departure Mode: Ambulatory.      Marly Camarillo RN

## 2021-10-07 NOTE — TELEPHONE ENCOUNTER
BMT CLINICAL SOCIAL WORK NOTE:    Focus: Resources    Data: Pt is a 61 year old female in process for coming for an auto BMT.    Interventions: Clinical  (CSW) received a call from Saritha VINES Supervisor [phone]. Saritha noted that no information was started for the pt's approval for lodging reimbursement until today. CSW asked why the delay as all of this was faxed on 9/28/21. Saritha did not have an answer why things were not processed. Saritha noted that they will not be approving lodging for her apheresis as it is under 200 miles. Post transplant will still need to be approved and Saritha noted they also need to get auth for transplant to approve lodging. This has been sent in, but may take 2 weeks to get approval. CSW expressed the urgency on this and the frustrations on the delay this will have on the pt's BMT. Saritha notes there is no way around this process.     Secondly, [facility] will end on 10/31/21 and a new agency [employer] will be starting 11/1/21. Saritha is unaware of what the program will offer and can not guarantee any benefits after 10/31/21. Saritha does not have any contact information for the new company, but will look for this information and send to this SW'er.    SYLVIAW also asked for Saritha to send in witting what is covered by [facility] as this information has changed so often that the pt would like documentation to confirm she will be reimbursed once she submits her forms.     CSW did provide this information to the pt via telephone and email. Discussed that at this time we can look at grants to help cover her currently lodging cost. Francisca [place] qian and [employer] submitted on the pt's behalf.     Assessment: Pt presented as friendly, but upset regarding the information.   Pt continues to be supported by her friend Lori who was also updated on this information.     Plan: CSW will continue to work with Pt and family to provide supportive counseling and assist with resources as  needed. CSW will continue to collaborate with multidisciplinary team regarding Pt's plan of care.     JEMIMA Lyn, [facility]  Pager: [phone]  Phone: [phone]

## 2021-10-07 NOTE — LETTER
10/7/2021         RE: Mil Seals  [address]  [place] [address]        Dear Colleague,    Thank you for referring your patient, Mil Seals, to the [facility] BLOOD AND MARROW [facility]ANSPLANT PROGRAM [place]. Please see a copy of my visit note below.    [facility]    ID: 60 yo F, MM, undergoing stem cell collections prior to auto PBSCT.     Interim hx: She is fine today.  Tolerating collection well.  No c/o nausea today.  Bone pain is also much better with tylenol, claritin, and oxycodone. Having some night sweats and low grade temps of 99 with gcsf. No infectious symptoms. Occasional cough due to asthma. Appetite is low but a bit better than previously.      8 point review of systems was negative other than noted above.     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 RT to sternum/rib 3990Gy 7/20/20-8/7/20, [place]  2. Janey/VRd since 5/11/21 for ~5 cycles, CR, last cycle finished 9/7 (C6D15)    Ph/E:   General: NAD; H&N: no mucosal lesions  Lungs clear; Heart RRR  Extremities: No edema  Skin: No rash  Neuro: Nonfocal;   Acess: R chest wall CVC intact.      A&P:   1. 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   - despite high risk disease collecting for TWO Transplants, see rationale below.  - s/p 2 day of collections. Collected 4.83 x10(6) CD34/kg. Third day of collections today. CD34 down to 18 yesterday, so mozobil given 10/6.  Will give mozobil again today and collect again tomorrow.     - Despite her High risk MM (del17p, p53 mutation, t(4;14), t(14;16), t(14;20), abnormal chromosome 1, -13 [by metaphase testing], or plasma cell leukemia), We will collect enough CD34 cells  for 2 transplants given her young age and presentation., and the collection goal is 8 million CD34/kg.  The day for admission to begin melphalan conditioning is Day -1 for melphalan 200 mg/m2 (not frail, creatinine clearance 30+).     CI score: 4 (FEV1%pred, depression).        2. Heme: Leukocytosis secondary to gcsf.     3.  Endo:   thyroid US with multiple nodules and 1 among them that meets criteria for Bx. Since most thyroid cancers that arise in the context of multinodular thyroid are slow growing and this nodule was diagnosed incidentally, will proceed with transplant and biopsy this at count recovery, day 28 auto. Dr. Joyner notified the patient by phone and notified her BMT coordinator by email to schedule the biopsy.     4. Renal: Cr stable  - hypokalemia: likely due to apheresis and poor oral intake. Give 40 mEq IV 10/7 d/t ongoing nausea.    - hypoMg:  give IV Mg     5. Bone pain: Claritin, oxycodone and tylenol prn.      6. Nausea: zofran and compazine prn with good relief.       RTC: this afternoon for 2nd dose of mozobil.  Return tomorrow for possible fourth day collections.     30 minutes spent on the date of the encounter doing chart review, history and exam, documentation and further activities per the note      Pattie Lyn pa-c  [phone]              Again, thank you for allowing me to participate in the care of your patient.        Sincerely,        BMT Advanced Practice Provider

## 2021-10-07 NOTE — PROGRESS NOTES
[facility] BMT Clinic    ID: 60 yo F, MM, undergoing stem cell collections prior to auto PBSCT.     Interim hx: She is fine today.  Tolerating collection well.  No c/o nausea today.  Bone pain is also much better with tylenol, claritin, and oxycodone. Having some night sweats and low grade temps of 99 with gcsf. No infectious symptoms. Occasional cough due to asthma. Appetite is low but a bit better than previously.      8 point review of systems was negative other than noted above.     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 RT to sternum/rib 3990Gy 7/20/20-8/7/20, [place]  2. Janey/VRd since 5/11/21 for ~5 cycles, CR, last cycle finished 9/7 (C6D15)    Ph/E:   General: NAD; H&N: no mucosal lesions  Lungs clear; Heart RRR  Extremities: No edema  Skin: No rash  Neuro: Nonfocal;   Acess: R chest wall CVC intact.      A&P:   1. 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   - despite high risk disease collecting for TWO Transplants, see rationale below.  - s/p 2 day of collections. Collected 4.83 x10(6) CD34/kg. Third day of collections today. CD34 down to 18 yesterday, so mozobil given 10/6.  Will give mozobil again today and collect again tomorrow.     - Despite her High risk MM (del17p, p53 mutation, t(4;14), t(14;16), t(14;20), abnormal chromosome 1, -13 [by metaphase testing], or plasma cell leukemia), We will collect enough CD34 cells for 2 transplants given her young age and presentation., and the collection goal is 8 million CD34/kg.  The day for admission to begin melphalan conditioning is Day -1 for melphalan 200 mg/m2 (not frail, creatinine clearance 30+).     CI score: 4 (FEV1%pred, depression).        2.  Heme: Leukocytosis secondary to gcsf.     3.  Endo:   thyroid US with multiple nodules and 1 among them that meets criteria for Bx. Since most thyroid cancers that arise in the context of multinodular thyroid are slow growing and this nodule was diagnosed incidentally, will proceed with transplant and biopsy this at count recovery, day 28 auto. Dr. Joyner notified the patient by phone and notified her BMT coordinator by email to schedule the biopsy.     4. Renal: Cr stable  - hypokalemia: likely due to apheresis and poor oral intake. Give 40 mEq IV 10/7 d/t ongoing nausea.    - hypoMg:  give IV Mg     5. Bone pain: Claritin, oxycodone and tylenol prn.      6. Nausea: zofran and compazine prn with good relief.       RTC: this afternoon for 2nd dose of mozobil.  Return tomorrow for possible fourth day collections.     30 minutes spent on the date of the encounter doing chart review, history and exam, documentation and further activities per the note      Pattie Lyn pa-c  [phone]
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
## 2021-10-07 NOTE — DISCHARGE INSTRUCTIONS
Autologous HPC/MNC Collection:   In most cases, the cell dose report will be available tomorrow morning.   The Bone Marrow Transplant (BMT) clinic staff looks at your report, and a decision is made if you will need another collection.   Remember it is important to follow a low fat diet during the collection process. Sometimes following the procedure, your blood platelet count may be low.  If you are told your platelet count is low, you need to avoid taking aspirin/aspirin containing products and avoid heavy physical activity and activities that may result in bruising or traumatic injury.  To contact the BMT fellow or attending physician after 5 p.m. call [phone].

## 2021-10-08 ENCOUNTER — HOSPITAL ENCOUNTER (OUTPATIENT)
Dept: LAB | Facility: CLINIC | Age: 61
End: 2021-10-08
Attending: INTERNAL MEDICINE
Payer: MEDICAID

## 2021-10-08 ENCOUNTER — TELEPHONE (OUTPATIENT)
Dept: TRANSPLANT | Facility: CLINIC | Age: 61
End: 2021-10-08

## 2021-10-08 ENCOUNTER — LAB (OUTPATIENT)
Dept: LAB | Facility: CLINIC | Age: 61
End: 2021-10-08
Attending: INTERNAL MEDICINE
Payer: COMMERCIAL

## 2021-10-08 ENCOUNTER — ONCOLOGY VISIT (OUTPATIENT)
Dept: TRANSPLANT | Facility: CLINIC | Age: 61
End: 2021-10-08
Attending: NURSE PRACTITIONER
Payer: MEDICAID

## 2021-10-08 VITALS
TEMPERATURE: 98.2 F | SYSTOLIC BLOOD PRESSURE: 123 MMHG | HEART RATE: 95 BPM | RESPIRATION RATE: 20 BRPM | DIASTOLIC BLOOD PRESSURE: 61 MMHG

## 2021-10-08 DIAGNOSIS — C90.00 MULTIPLE MYELOMA, REMISSION STATUS UNSPECIFIED (H): ICD-10-CM

## 2021-10-08 DIAGNOSIS — C90.00 MULTIPLE MYELOMA, REMISSION STATUS UNSPECIFIED (H): Primary | ICD-10-CM

## 2021-10-08 DIAGNOSIS — Z52.001 STEM CELL DONOR: Primary | ICD-10-CM

## 2021-10-08 LAB
ANION GAP SERPL CALCULATED.3IONS-SCNC: 11 MMOL/L (ref 3–14)
BASOPHILS # BLD MANUAL: 0 10E3/UL (ref 0–0.2)
BASOPHILS NFR BLD MANUAL: 0 %
BUN SERPL-MCNC: 6 MG/DL (ref 7–30)
CALCIUM SERPL-MCNC: 8.4 MG/DL (ref 8.5–10.1)
CD34 ABSOLUTE COUNT COMMENT: NORMAL
CD34 CELLS # SPEC: 22 CELLS/UL
CD34 CELLS NFR SPEC: 0.05 %
CHLORIDE BLD-SCNC: 107 MMOL/L (ref 94–109)
CO2 SERPL-SCNC: 29 MMOL/L (ref 20–32)
CREAT SERPL-MCNC: 0.78 MG/DL (ref 0.52–1.04)
EOSINOPHIL # BLD MANUAL: 0 10E3/UL (ref 0–0.7)
EOSINOPHIL NFR BLD MANUAL: 0 %
ERYTHROCYTE [DISTWIDTH] IN BLOOD BY AUTOMATED COUNT: 15.3 % (ref 10–15)
GFR SERPL CREATININE-BSD FRML MDRD: 82 ML/MIN/1.73M2
GLUCOSE BLD-MCNC: 118 MG/DL (ref 70–99)
HCT VFR BLD AUTO: 29.4 % (ref 35–47)
HGB BLD-MCNC: 9.7 G/DL (ref 11.7–15.7)
LYMPHOCYTES # BLD MANUAL: 0.9 10E3/UL (ref 0.8–5.3)
LYMPHOCYTES NFR BLD MANUAL: 2 %
MAGNESIUM SERPL-MCNC: 1.5 MG/DL (ref 1.6–2.3)
MCH RBC QN AUTO: 30.4 PG (ref 26.5–33)
MCHC RBC AUTO-ENTMCNC: 33 G/DL (ref 31.5–36.5)
MCV RBC AUTO: 92 FL (ref 78–100)
MONOCYTES # BLD MANUAL: 1.7 10E3/UL (ref 0–1.3)
MONOCYTES NFR BLD MANUAL: 4 %
MYELOCYTES # BLD MANUAL: 0.4 10E3/UL
MYELOCYTES NFR BLD MANUAL: 1 %
NEUTROPHILS # BLD MANUAL: 39.7 10E3/UL (ref 1.6–8.3)
NEUTROPHILS NFR BLD MANUAL: 92 %
PLAT MORPH BLD: ABNORMAL
PLATELET # BLD AUTO: 61 10E3/UL (ref 150–450)
POTASSIUM BLD-SCNC: 3.3 MMOL/L (ref 3.4–5.3)
PRODUCT NUMBER FLOW CYTOMETRY: NORMAL
PROMYELOCYTES # BLD MANUAL: 0.4 10E3/UL
PROMYELOCYTES NFR BLD MANUAL: 1 %
RBC # BLD AUTO: 3.19 10E6/UL (ref 3.8–5.2)
RBC MORPH BLD: ABNORMAL
SARS-COV-2 RNA RESP QL NAA+PROBE: NEGATIVE
SODIUM SERPL-SCNC: 147 MMOL/L (ref 133–144)
VIABLE CD34 CELLS NFR FLD: 100 %
WBC # BLD AUTO: 43.2 10E3/UL (ref 4–11)

## 2021-10-08 PROCEDURE — 250N000011 HC RX IP 250 OP 636: Performed by: NURSE PRACTITIONER

## 2021-10-08 PROCEDURE — 96372 THER/PROPH/DIAG INJ SC/IM: CPT | Mod: XS | Performed by: PHYSICIAN ASSISTANT

## 2021-10-08 PROCEDURE — U0005 INFEC AGEN DETEC AMPLI PROBE: HCPCS | Mod: 90 | Performed by: PATHOLOGY

## 2021-10-08 PROCEDURE — 250N000011 HC RX IP 250 OP 636: Performed by: PHYSICIAN ASSISTANT

## 2021-10-08 PROCEDURE — 99214 OFFICE O/P EST MOD 30 MIN: CPT

## 2021-10-08 PROCEDURE — 85048 AUTOMATED LEUKOCYTE COUNT: CPT

## 2021-10-08 PROCEDURE — 250N000011 HC RX IP 250 OP 636: Performed by: STUDENT IN AN ORGANIZED HEALTH CARE EDUCATION/TRAINING PROGRAM

## 2021-10-08 PROCEDURE — 38206 HARVEST AUTO STEM CELLS: CPT

## 2021-10-08 PROCEDURE — 86367 STEM CELLS TOTAL COUNT: CPT

## 2021-10-08 PROCEDURE — 36592 COLLECT BLOOD FROM PICC: CPT

## 2021-10-08 PROCEDURE — 96367 TX/PROPH/DG ADDL SEQ IV INF: CPT | Mod: 59

## 2021-10-08 PROCEDURE — 83735 ASSAY OF MAGNESIUM: CPT

## 2021-10-08 PROCEDURE — 82374 ASSAY BLOOD CARBON DIOXIDE: CPT

## 2021-10-08 PROCEDURE — 250N000009 HC RX 250: Performed by: STUDENT IN AN ORGANIZED HEALTH CARE EDUCATION/TRAINING PROGRAM

## 2021-10-08 PROCEDURE — 38207 CRYOPRESERVE STEM CELLS: CPT

## 2021-10-08 PROCEDURE — U0003 INFECTIOUS AGENT DETECTION BY NUCLEIC ACID (DNA OR RNA); SEVERE ACUTE RESPIRATORY SYNDROME CORONAVIRUS 2 (SARS-COV-2) (CORONAVIRUS DISEASE [COVID-19]), AMPLIFIED PROBE TECHNIQUE, MAKING USE OF HIGH THROUGHPUT TECHNOLOGIES AS DESCRIBED BY CMS-2020-01-R: HCPCS | Mod: 90 | Performed by: PATHOLOGY

## 2021-10-08 PROCEDURE — 96365 THER/PROPH/DIAG IV INF INIT: CPT | Mod: 59

## 2021-10-08 RX ORDER — POTASSIUM CHLORIDE 29.8 MG/ML
20 INJECTION INTRAVENOUS ONCE
Status: COMPLETED | OUTPATIENT
Start: 2021-10-08 | End: 2021-10-08

## 2021-10-08 RX ORDER — MAGNESIUM SULFATE HEPTAHYDRATE 40 MG/ML
2 INJECTION, SOLUTION INTRAVENOUS ONCE
Status: COMPLETED | OUTPATIENT
Start: 2021-10-08 | End: 2021-10-08

## 2021-10-08 RX ORDER — HEPARIN SODIUM (PORCINE) LOCK FLUSH IV SOLN 100 UNIT/ML 100 UNIT/ML
3 SOLUTION INTRAVENOUS ONCE
Status: COMPLETED | OUTPATIENT
Start: 2021-10-08 | End: 2021-10-08

## 2021-10-08 RX ORDER — PLERIXAFOR 24 MG/1.2ML
0.24 SOLUTION SUBCUTANEOUS DAILY
Status: CANCELLED
Start: 2021-10-09

## 2021-10-08 RX ADMIN — POTASSIUM CHLORIDE 20 MEQ: 400 INJECTION, SOLUTION INTRAVENOUS at 12:19

## 2021-10-08 RX ADMIN — ANTICOAGULANT CITRATE DEXTROSE SOLUTION FORMULA A 1342 ML: 12.25; 11; 3.65 SOLUTION INTRAVENOUS at 08:44

## 2021-10-08 RX ADMIN — CALCIUM GLUCONATE 1468 MG/HR: 94 INJECTION, SOLUTION INTRAVENOUS at 08:43

## 2021-10-08 RX ADMIN — Medication 3 ML: at 13:47

## 2021-10-08 RX ADMIN — FILGRASTIM 780 MCG: 480 INJECTION, SOLUTION INTRAVENOUS; SUBCUTANEOUS at 09:10

## 2021-10-08 RX ADMIN — Medication 3 ML: at 13:46

## 2021-10-08 RX ADMIN — MAGNESIUM SULFATE IN WATER 2 G: 40 INJECTION, SOLUTION INTRAVENOUS at 12:13

## 2021-10-08 NOTE — DISCHARGE INSTRUCTIONS
Apheresis Blood Donor Center Post Instructions  You may feel tired after your procedure today.   Please call your doctor if you have:  bleeding that doesn t stop, fever, pain where a needle or tube (catheter) was placed, seizures, trouble breathing, red urine, nausea or vomiting, other health concerns.     If your symptoms are severe, call [phone]..    If you have a Central Venous Catheter:  Notify your doctor if you have had a fever, chills, shaking  or redness, warmth, swelling, drainage at the exit-site.  This could be a sign of infection.      The Apheresis/Blood Donor Center is open Monday-Friday 7:30 a.m. to 5 p.m.  The phone number is [phone].  A Transfusion Medicine physician can be reached after 5:00 p.m. weekdays and on weekends /Holidays by calling [phone], and asking for the physician on call.      Autologous HPC/MNC Collection:   In most cases, the cell dose report will be available tomorrow morning.   The Bone Marrow Transplant (BMT) clinic staff looks at your report, and a decision is made if you will need another collection.   Remember it is important to follow a low fat diet during the collection process. Sometimes following the procedure, your blood platelet count may be low.  If you are told your platelet count is low, you need to avoid taking aspirin/aspirin containing products and avoid heavy physical activity and activities that may result in bruising or traumatic injury.  To contact the BMT fellow or attending physician after 5 p.m. call [phone].

## 2021-10-08 NOTE — TELEPHONE ENCOUNTER
Left voice mail for pt to confirm anticipated admission 10/12 at 6:30 am. Additionally informed pt that Appointment with Dr Stacy is rescheduled to 10/19 at 1:30.

## 2021-10-08 NOTE — LETTER
10/8/2021         RE: Mil Seals  [address]  [facility] [address]        Dear Colleague,    Thank you for referring your patient, Mil Seals, to the [facility] BLOOD AND MARROW TRANSPLANT PROGRAM [place]. Please see a copy of my visit note below.    BMT Clinic Note    ID: 60 yo F, MM, undergoing GCSF primed stem cell collections prior to auto PBSCT.     Interim hx: She is fine today.  Tolerating collection without difficutly.  Mild rib pain, likely due to GCSF.  Poor appetite & taste alteration.  Eating a little with emesis or diarrhea.  Afebrile.  Chronic cough, using Albuterol fo asthma.  C/O sore on her tongue possibly from albuterol. Having some night sweats  8 point review of systems was negative other than noted above.     Ph/E:   Vs reviewed  General: NAD  H&N: no mucosal lesions  Lungs clear  Heart RRR  Extremities: No edema  Skin: No rash  Neuro: Nonfocal;   Acess: R chest wall CVC mildly tender, no drainage    A&P:   1. 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   - despite high risk disease collecting for TWO Transplants, see rationale below.  - Total cd34 collected to date with GCSF & Mozobil 7.5.  Will collect a final time today.      - Despite her High risk MM (del17p, p53 mutation, t(4;14), t(14;16), t(14;20), abnormal chromosome 1, -13 [by metaphase testing], or plasma cell leukemia), We will collect enough CD34 cells for 2 transplants given her young age and presentation., and the collection goal is 8 million CD34/kg.  The day for admission to begin melphalan conditioning is Day -1 for melphalan 200 mg/m2 (not frail, creatinine clearance 30+).    2. Heme: Leukocytosis secondary to gcsf.   - plt trending down from collections      3.  Endo:   thyroid US with multiple nodules and 1 among them that meets criteria for Bx. Since most thyroid cancers  that arise in the context of multinodular thyroid are slow growing and this nodule was diagnosed incidentally, will proceed with transplant and biopsy this at count recovery, day 28 auto. Dr. Joyner notified the patient by phone and notified her BMT coordinator by email to schedule the biopsy.     4. Renal: Cr stable  - hypoK & hypoMg.  Replace both IV today (pt refusing oral supp)    5. Bone pain: Claritin, oxycodone and tylenol prn.      6. Nausea: zofran and compazine prn with good relief.       RTC daily for line flushes & check plt, Mg & K on 10/10  Scheduled for admission 10/12    30 minutes spent on the date of the encounter doing chart review, history and exam, documentation and further activities per the note      Louise Russo          Again, thank you for allowing me to participate in the care of your patient.        Sincerely,        BMT Advanced Practice Provider
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
## 2021-10-08 NOTE — PROGRESS NOTES
BMT Clinic Note    ID: 60 yo F, MM, undergoing GCSF primed stem cell collections prior to auto PBSCT.     Interim hx: She is fine today.  Tolerating collection without difficutly.  Mild rib pain, likely due to GCSF.  Poor appetite & taste alteration.  Eating a little with emesis or diarrhea.  Afebrile.  Chronic cough, using Albuterol fo asthma.  C/O sore on her tongue possibly from albuterol. Having some night sweats  8 point review of systems was negative other than noted above.     Ph/E:   Vs reviewed  General: NAD  H&N: no mucosal lesions  Lungs clear  Heart RRR  Extremities: No edema  Skin: No rash  Neuro: Nonfocal;   Acess: R chest wall CVC mildly tender, no drainage    A&P:   1. 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   - despite high risk disease collecting for TWO Transplants, see rationale below.  - Total cd34 collected to date with GCSF & Mozobil 7.5.  Will collect a final time today.      - Despite her High risk MM (del17p, p53 mutation, t(4;14), t(14;16), t(14;20), abnormal chromosome 1, -13 [by metaphase testing], or plasma cell leukemia), We will collect enough CD34 cells for 2 transplants given her young age and presentation., and the collection goal is 8 million CD34/kg.  The day for admission to begin melphalan conditioning is Day -1 for melphalan 200 mg/m2 (not frail, creatinine clearance 30+).    2. Heme: Leukocytosis secondary to gcsf.   - plt trending down from collections      3.  Endo:   thyroid US with multiple nodules and 1 among them that meets criteria for Bx. Since most thyroid cancers that arise in the context of multinodular thyroid are slow growing and this nodule was diagnosed incidentally, will proceed with transplant and biopsy this at count recovery, day 28 auto. Dr. Joyner notified the patient by phone and notified her BMT coordinator by email to schedule  the biopsy.     4. Renal: Cr stable  - hypoK & hypoMg.  Replace both IV today (pt refusing oral supp)    5. Bone pain: Claritin, oxycodone and tylenol prn.      6. Nausea: zofran and compazine prn with good relief.       RTC daily for line flushes & check plt, Mg & K on 10/10  Scheduled for admission 10/12    30 minutes spent on the date of the encounter doing chart review, history and exam, documentation and further activities per the note      Louise Russo

## 2021-10-09 ENCOUNTER — LAB (OUTPATIENT)
Dept: LAB | Facility: CLINIC | Age: 61
End: 2021-10-09
Attending: INTERNAL MEDICINE
Payer: MEDICAID

## 2021-10-09 PROCEDURE — 96523 IRRIG DRUG DELIVERY DEVICE: CPT

## 2021-10-09 PROCEDURE — 250N000011 HC RX IP 250 OP 636: Performed by: INTERNAL MEDICINE

## 2021-10-09 RX ORDER — HEPARIN SODIUM,PORCINE 10 UNIT/ML
5 VIAL (ML) INTRAVENOUS ONCE
Status: COMPLETED | OUTPATIENT
Start: 2021-10-09 | End: 2021-10-09

## 2021-10-09 RX ADMIN — Medication 5 ML: at 10:16

## 2021-10-09 NOTE — NURSING NOTE
Chief Complaint   Patient presents with     Port Flush     CVC line flush by RN.     CVC line flush by RN. Good blood return in both lumens and lines flushed with NS and Heparin. Pt tolerated well.     Faisal Yang RN

## 2021-10-10 ENCOUNTER — LAB (OUTPATIENT)
Dept: LAB | Facility: CLINIC | Age: 61
End: 2021-10-10
Attending: INTERNAL MEDICINE
Payer: MEDICAID

## 2021-10-10 DIAGNOSIS — C90.00 MULTIPLE MYELOMA, REMISSION STATUS UNSPECIFIED (H): ICD-10-CM

## 2021-10-10 LAB
ANION GAP SERPL CALCULATED.3IONS-SCNC: 5 MMOL/L (ref 3–14)
BASOPHILS # BLD AUTO: 0.1 10E3/UL (ref 0–0.2)
BASOPHILS NFR BLD AUTO: 0 %
BUN SERPL-MCNC: 7 MG/DL (ref 7–30)
CALCIUM SERPL-MCNC: 7.8 MG/DL (ref 8.5–10.1)
CHLORIDE BLD-SCNC: 112 MMOL/L (ref 94–109)
CO2 SERPL-SCNC: 29 MMOL/L (ref 20–32)
CREAT SERPL-MCNC: 0.72 MG/DL (ref 0.52–1.04)
EOSINOPHIL # BLD AUTO: 0.2 10E3/UL (ref 0–0.7)
EOSINOPHIL NFR BLD AUTO: 2 %
ERYTHROCYTE [DISTWIDTH] IN BLOOD BY AUTOMATED COUNT: 15.2 % (ref 10–15)
GFR SERPL CREATININE-BSD FRML MDRD: 90 ML/MIN/1.73M2
GLUCOSE BLD-MCNC: 100 MG/DL (ref 70–99)
HCT VFR BLD AUTO: 29.8 % (ref 35–47)
HGB BLD-MCNC: 9.6 G/DL (ref 11.7–15.7)
IMM GRANULOCYTES # BLD: 0.3 10E3/UL
IMM GRANULOCYTES NFR BLD: 2 %
LYMPHOCYTES # BLD AUTO: 0.6 10E3/UL (ref 0.8–5.3)
LYMPHOCYTES NFR BLD AUTO: 5 %
MAGNESIUM SERPL-MCNC: 1.9 MG/DL (ref 1.6–2.3)
MCH RBC QN AUTO: 29.9 PG (ref 26.5–33)
MCHC RBC AUTO-ENTMCNC: 32.2 G/DL (ref 31.5–36.5)
MCV RBC AUTO: 93 FL (ref 78–100)
MONOCYTES # BLD AUTO: 0.5 10E3/UL (ref 0–1.3)
MONOCYTES NFR BLD AUTO: 5 %
NEUTROPHILS # BLD AUTO: 10 10E3/UL (ref 1.6–8.3)
NEUTROPHILS NFR BLD AUTO: 86 %
NRBC # BLD AUTO: 0 10E3/UL
NRBC BLD AUTO-RTO: 0 /100
PLATELET # BLD AUTO: 111 10E3/UL (ref 150–450)
POTASSIUM BLD-SCNC: 4 MMOL/L (ref 3.4–5.3)
RBC # BLD AUTO: 3.21 10E6/UL (ref 3.8–5.2)
SODIUM SERPL-SCNC: 146 MMOL/L (ref 133–144)
WBC # BLD AUTO: 11.7 10E3/UL (ref 4–11)

## 2021-10-10 PROCEDURE — 36592 COLLECT BLOOD FROM PICC: CPT

## 2021-10-10 PROCEDURE — 83735 ASSAY OF MAGNESIUM: CPT

## 2021-10-10 PROCEDURE — 80048 BASIC METABOLIC PNL TOTAL CA: CPT

## 2021-10-10 PROCEDURE — 250N000011 HC RX IP 250 OP 636: Performed by: INTERNAL MEDICINE

## 2021-10-10 PROCEDURE — 85025 COMPLETE CBC W/AUTO DIFF WBC: CPT

## 2021-10-10 RX ORDER — HEPARIN SODIUM,PORCINE 10 UNIT/ML
5 VIAL (ML) INTRAVENOUS ONCE
Status: COMPLETED | OUTPATIENT
Start: 2021-10-10 | End: 2021-10-10

## 2021-10-10 RX ADMIN — Medication 5 ML: at 10:38

## 2021-10-10 NOTE — NURSING NOTE
Chief Complaint   Patient presents with     Blood Draw     Labs drawn via CVC by RN.     Labs drawn from CVC by rn.  Good blood return noted in both lumens.  Both lumens flushed with NS and heparin.  Pt tolerated well.  VS taken.  Pt checked in for next appt.    Faisal Yang RN

## 2021-10-11 ENCOUNTER — TELEPHONE (OUTPATIENT)
Dept: TRANSPLANT | Facility: CLINIC | Age: 61
End: 2021-10-11

## 2021-10-11 ENCOUNTER — LAB (OUTPATIENT)
Dept: LAB | Facility: CLINIC | Age: 61
End: 2021-10-11
Attending: INTERNAL MEDICINE
Payer: MEDICAID

## 2021-10-11 DIAGNOSIS — C90.00 MULTIPLE MYELOMA, REMISSION STATUS UNSPECIFIED (H): Primary | ICD-10-CM

## 2021-10-11 PROCEDURE — 250N000011 HC RX IP 250 OP 636: Performed by: INTERNAL MEDICINE

## 2021-10-11 PROCEDURE — 96523 IRRIG DRUG DELIVERY DEVICE: CPT

## 2021-10-11 RX ORDER — HEPARIN SODIUM,PORCINE 10 UNIT/ML
5 VIAL (ML) INTRAVENOUS
Status: DISCONTINUED | OUTPATIENT
Start: 2021-10-11 | End: 2021-10-17 | Stop reason: HOSPADM

## 2021-10-11 RX ADMIN — Medication 5 ML: at 10:17

## 2021-10-11 RX ADMIN — Medication 5 ML: at 10:16

## 2021-10-11 NOTE — TELEPHONE ENCOUNTER
Updated patient regarding changes to plan for admission this week. Had planned for 10/12 now, hoping for 10/14 pending insurance approval. Informed patient they will need a new covid test resulted prior to admission and an order will be placed. Also shared with pt the counts on her cell collections, exceeding the goal of 8 to 10.8!. All questions answered, will contact when insurance approval is finalized.

## 2021-10-12 ENCOUNTER — LAB (OUTPATIENT)
Dept: LAB | Facility: CLINIC | Age: 61
End: 2021-10-12
Attending: INTERNAL MEDICINE

## 2021-10-12 ENCOUNTER — LAB (OUTPATIENT)
Dept: LAB | Facility: CLINIC | Age: 61
End: 2021-10-12
Attending: INTERNAL MEDICINE
Payer: MEDICAID

## 2021-10-12 DIAGNOSIS — C90.00 MULTIPLE MYELOMA, REMISSION STATUS UNSPECIFIED (H): ICD-10-CM

## 2021-10-12 DIAGNOSIS — C90.00 MULTIPLE MYELOMA, REMISSION STATUS UNSPECIFIED (H): Primary | ICD-10-CM

## 2021-10-12 PROCEDURE — U0003 INFECTIOUS AGENT DETECTION BY NUCLEIC ACID (DNA OR RNA); SEVERE ACUTE RESPIRATORY SYNDROME CORONAVIRUS 2 (SARS-COV-2) (CORONAVIRUS DISEASE [COVID-19]), AMPLIFIED PROBE TECHNIQUE, MAKING USE OF HIGH THROUGHPUT TECHNOLOGIES AS DESCRIBED BY CMS-2020-01-R: HCPCS | Mod: 90 | Performed by: PATHOLOGY

## 2021-10-12 PROCEDURE — U0005 INFEC AGEN DETEC AMPLI PROBE: HCPCS | Mod: 90 | Performed by: PATHOLOGY

## 2021-10-12 PROCEDURE — 250N000011 HC RX IP 250 OP 636: Performed by: INTERNAL MEDICINE

## 2021-10-12 PROCEDURE — 96523 IRRIG DRUG DELIVERY DEVICE: CPT

## 2021-10-12 RX ORDER — BLOOD PRESSURE TEST KIT
2 KIT MISCELLANEOUS DAILY
Qty: 100 EACH | Refills: 0 | Status: ON HOLD | OUTPATIENT
Start: 2021-10-12 | End: 2021-10-15

## 2021-10-12 RX ORDER — HEPARIN SODIUM,PORCINE 10 UNIT/ML
5 VIAL (ML) INTRAVENOUS ONCE
Status: COMPLETED | OUTPATIENT
Start: 2021-10-12 | End: 2021-10-12

## 2021-10-12 RX ORDER — HEPARIN SODIUM,PORCINE 10 UNIT/ML
5 VIAL (ML) INTRAVENOUS DAILY
Qty: 300 ML | Refills: 0 | Status: ON HOLD | OUTPATIENT
Start: 2021-10-12 | End: 2021-10-15

## 2021-10-12 RX ADMIN — Medication 5 ML: at 15:13

## 2021-10-12 RX ADMIN — Medication 5 ML: at 15:12

## 2021-10-12 NOTE — NURSING NOTE
Chief Complaint   Patient presents with     Blood Draw     CVC flushed by rn in lab.     CVC line flushed with saline and heparin by rn in lab.    Jerman Hyman RN

## 2021-10-13 ENCOUNTER — LAB (OUTPATIENT)
Dept: LAB | Facility: CLINIC | Age: 61
End: 2021-10-13
Attending: INTERNAL MEDICINE
Payer: MEDICAID

## 2021-10-13 ENCOUNTER — TELEPHONE (OUTPATIENT)
Dept: TRANSPLANT | Facility: CLINIC | Age: 61
End: 2021-10-13

## 2021-10-13 ENCOUNTER — CARE COORDINATION (OUTPATIENT)
Dept: TRANSPLANT | Facility: CLINIC | Age: 61
End: 2021-10-13

## 2021-10-13 DIAGNOSIS — C90.00 MULTIPLE MYELOMA, REMISSION STATUS UNSPECIFIED (H): Primary | ICD-10-CM

## 2021-10-13 LAB — SARS-COV-2 RNA RESP QL NAA+PROBE: NEGATIVE

## 2021-10-13 PROCEDURE — 250N000011 HC RX IP 250 OP 636: Performed by: INTERNAL MEDICINE

## 2021-10-13 PROCEDURE — 96523 IRRIG DRUG DELIVERY DEVICE: CPT

## 2021-10-13 RX ORDER — DEXAMETHASONE 4 MG/1
8 TABLET ORAL DAILY
Status: CANCELLED
Start: 2021-10-15

## 2021-10-13 RX ORDER — DEXTROSE MONOHYDRATE 50 MG/ML
10-20 INJECTION, SOLUTION INTRAVENOUS
Status: CANCELLED | OUTPATIENT
Start: 2021-10-20

## 2021-10-13 RX ORDER — ALLOPURINOL 300 MG/1
300 TABLET ORAL ONCE
Status: CANCELLED
Start: 2021-10-14 | End: 2021-10-14

## 2021-10-13 RX ORDER — ACETAMINOPHEN 325 MG/1
650 TABLET ORAL ONCE
Status: CANCELLED
Start: 2021-10-15 | End: 2021-10-15

## 2021-10-13 RX ORDER — MEPERIDINE HYDROCHLORIDE 25 MG/ML
25-50 INJECTION INTRAMUSCULAR; INTRAVENOUS; SUBCUTANEOUS
Status: CANCELLED | OUTPATIENT
Start: 2021-10-15 | End: 2021-10-16

## 2021-10-13 RX ORDER — ONDANSETRON 8 MG/1
8 TABLET, FILM COATED ORAL EVERY 8 HOURS
Status: CANCELLED
Start: 2021-10-14

## 2021-10-13 RX ORDER — DIPHENHYDRAMINE HCL 25 MG
25 CAPSULE ORAL ONCE
Status: CANCELLED
Start: 2021-10-15 | End: 2021-10-15

## 2021-10-13 RX ORDER — HEPARIN SODIUM,PORCINE 10 UNIT/ML
5 VIAL (ML) INTRAVENOUS ONCE
Status: COMPLETED | OUTPATIENT
Start: 2021-10-13 | End: 2021-10-13

## 2021-10-13 RX ADMIN — Medication 5 ML: at 16:54

## 2021-10-13 NOTE — TELEPHONE ENCOUNTER
Spoke with patient to confirm admission 10/14. Pt will arrive to [facility] at [address] at 6:30am.

## 2021-10-13 NOTE — NURSING NOTE
Chief Complaint   Patient presents with     Labs Only     CVC flushed with saline and heparin in lab by RN.     CVC line flushed with saline and heparin by RN.   Levon Silva RN

## 2021-10-13 NOTE — PROGRESS NOTES
Inpatient Admission Information:      Admit Date:  10/14   Diagnosis:  Multiple Myeloma   Transplant Type:  Auto   Protocol:  [id number]      Notes: Pt has history of anxiety and ptsd. Pt will be meeting with Dr. Street 10/19.        New Eval Work-Up   MD Sridhar Jones    Date 8/12 9/24         Consult Type Date   1 Cards consult 9/24   2     3           Long Term Follow-Up   MD Ar Jones

## 2021-10-13 NOTE — TELEPHONE ENCOUNTER
BMT CLINICAL SOCIAL WORK NOTE:    Focus: Resources    Data: Pt is a 60 y/o female planning to admit for auto transplant 10/14/21.    Interventions: Clinical  (CSW) received a call from the pt's county worker Afsaneh [phone] who noted that the pt was approved for lodging through 10/31/2. Afsaneh still does not have any information for Vao who will be taking over on 11/1/21, but she is still working on this. SW updated the pt regarding the above.    Call was placed to [facility] to check the status of the pt's qian applications. SW was informed that one application was waiting for provider approval. Approval given and Juanita at [facility] will process the applications.       Plan: CSW will continue to work with Pt and family to provide supportive counseling and assist with resources as needed. CSW will continue to collaborate with multidisciplinary team regarding Pt's plan of care.     JEMIMA Lyn, [facility]  Pager: [phone]  Phone: [phone]

## 2021-10-14 ENCOUNTER — HOSPITAL ENCOUNTER (INPATIENT)
Facility: CLINIC | Age: 61
LOS: 2 days | Discharge: HOME | End: 2021-10-16
Attending: INTERNAL MEDICINE | Admitting: INTERNAL MEDICINE
Payer: MEDICAID

## 2021-10-14 DIAGNOSIS — C90.00 MULTIPLE MYELOMA, REMISSION STATUS UNSPECIFIED (H): Primary | ICD-10-CM

## 2021-10-14 DIAGNOSIS — Z94.81 S/P BONE MARROW TRANSPLANT (H): ICD-10-CM

## 2021-10-14 LAB
ABO/RH(D): ABNORMAL
ALBUMIN SERPL-MCNC: 2.6 G/DL (ref 3.4–5)
ALP SERPL-CCNC: 94 U/L (ref 40–150)
ALT SERPL W P-5'-P-CCNC: 24 U/L (ref 0–50)
ANION GAP SERPL CALCULATED.3IONS-SCNC: 3 MMOL/L (ref 3–14)
ANTIBODY SCREEN, TUBE: NORMAL
ANTIBODY SCREEN: POSITIVE
APTT PPP: 26 SECONDS (ref 22–38)
AST SERPL W P-5'-P-CCNC: 11 U/L (ref 0–45)
BASOPHILS # BLD AUTO: 0 10E3/UL (ref 0–0.2)
BASOPHILS NFR BLD AUTO: 1 %
BILIRUB SERPL-MCNC: 0.3 MG/DL (ref 0.2–1.3)
BUN SERPL-MCNC: 10 MG/DL (ref 7–30)
CALCIUM SERPL-MCNC: 7.1 MG/DL (ref 8.5–10.1)
CHLORIDE BLD-SCNC: 117 MMOL/L (ref 94–109)
CO2 SERPL-SCNC: 24 MMOL/L (ref 20–32)
CREAT SERPL-MCNC: 0.55 MG/DL (ref 0.52–1.04)
EOSINOPHIL # BLD AUTO: 0.2 10E3/UL (ref 0–0.7)
EOSINOPHIL NFR BLD AUTO: 5 %
ERYTHROCYTE [DISTWIDTH] IN BLOOD BY AUTOMATED COUNT: 15.1 % (ref 10–15)
GFR SERPL CREATININE-BSD FRML MDRD: >90 ML/MIN/1.73M2
GLUCOSE BLD-MCNC: 91 MG/DL (ref 70–99)
HCT VFR BLD AUTO: 30.2 % (ref 35–47)
HGB BLD-MCNC: 9.7 G/DL (ref 11.7–15.7)
IMM GRANULOCYTES # BLD: 0 10E3/UL
IMM GRANULOCYTES NFR BLD: 0 %
INR PPP: 0.99 (ref 0.85–1.15)
LACTATE SERPL-SCNC: 0.9 MMOL/L (ref 0.7–2)
LYMPHOCYTES # BLD AUTO: 0.6 10E3/UL (ref 0.8–5.3)
LYMPHOCYTES NFR BLD AUTO: 17 %
MAGNESIUM SERPL-MCNC: 1.9 MG/DL (ref 1.6–2.3)
MCH RBC QN AUTO: 30.7 PG (ref 26.5–33)
MCHC RBC AUTO-ENTMCNC: 32.1 G/DL (ref 31.5–36.5)
MCV RBC AUTO: 96 FL (ref 78–100)
MONOCYTES # BLD AUTO: 0.3 10E3/UL (ref 0–1.3)
MONOCYTES NFR BLD AUTO: 9 %
NEUTROPHILS # BLD AUTO: 2.4 10E3/UL (ref 1.6–8.3)
NEUTROPHILS NFR BLD AUTO: 68 %
NRBC # BLD AUTO: 0 10E3/UL
NRBC BLD AUTO-RTO: 0 /100
PLATELET # BLD AUTO: 266 10E3/UL (ref 150–450)
POTASSIUM BLD-SCNC: 3.3 MMOL/L (ref 3.4–5.3)
POTASSIUM BLD-SCNC: 4.1 MMOL/L (ref 3.4–5.3)
PROT SERPL-MCNC: 5 G/DL (ref 6.8–8.8)
RBC # BLD AUTO: 3.16 10E6/UL (ref 3.8–5.2)
SODIUM SERPL-SCNC: 144 MMOL/L (ref 133–144)
SPECIMEN EXPIRATION DATE: ABNORMAL
SPECIMEN EXPIRATION DATE: NORMAL
URATE SERPL-MCNC: 3.3 MG/DL (ref 2.6–6)
WBC # BLD AUTO: 3.5 10E3/UL (ref 4–11)

## 2021-10-14 PROCEDURE — 250N000013 HC RX MED GY IP 250 OP 250 PS 637: Performed by: INTERNAL MEDICINE

## 2021-10-14 PROCEDURE — 85014 HEMATOCRIT: CPT | Performed by: PHYSICIAN ASSISTANT

## 2021-10-14 PROCEDURE — 99223 1ST HOSP IP/OBS HIGH 75: CPT | Mod: AI | Performed by: INTERNAL MEDICINE

## 2021-10-14 PROCEDURE — 85610 PROTHROMBIN TIME: CPT | Performed by: PHYSICIAN ASSISTANT

## 2021-10-14 PROCEDURE — 999N000022 HC STATISTIC AUTOLOGOUS BM-INITIAL INFUSION

## 2021-10-14 PROCEDURE — 83605 ASSAY OF LACTIC ACID: CPT | Performed by: INTERNAL MEDICINE

## 2021-10-14 PROCEDURE — 250N000011 HC RX IP 250 OP 636: Performed by: INTERNAL MEDICINE

## 2021-10-14 PROCEDURE — 258N000003 HC RX IP 258 OP 636: Performed by: INTERNAL MEDICINE

## 2021-10-14 PROCEDURE — 206N000001 HC R&B BMT UMMC

## 2021-10-14 PROCEDURE — 86850 RBC ANTIBODY SCREEN: CPT | Performed by: PHYSICIAN ASSISTANT

## 2021-10-14 PROCEDURE — 250N000011 HC RX IP 250 OP 636: Performed by: PHYSICIAN ASSISTANT

## 2021-10-14 PROCEDURE — 86901 BLOOD TYPING SEROLOGIC RH(D): CPT | Performed by: PHYSICIAN ASSISTANT

## 2021-10-14 PROCEDURE — 84550 ASSAY OF BLOOD/URIC ACID: CPT | Performed by: PHYSICIAN ASSISTANT

## 2021-10-14 PROCEDURE — 85730 THROMBOPLASTIN TIME PARTIAL: CPT | Performed by: PHYSICIAN ASSISTANT

## 2021-10-14 PROCEDURE — 80053 COMPREHEN METABOLIC PANEL: CPT | Performed by: PHYSICIAN ASSISTANT

## 2021-10-14 PROCEDURE — 3E04305 INTRODUCTION OF OTHER ANTINEOPLASTIC INTO CENTRAL VEIN, PERCUTANEOUS APPROACH: ICD-10-PCS | Performed by: INTERNAL MEDICINE

## 2021-10-14 PROCEDURE — 83735 ASSAY OF MAGNESIUM: CPT | Performed by: PHYSICIAN ASSISTANT

## 2021-10-14 PROCEDURE — 250N000013 HC RX MED GY IP 250 OP 250 PS 637: Performed by: PHYSICIAN ASSISTANT

## 2021-10-14 PROCEDURE — 250N000012 HC RX MED GY IP 250 OP 636 PS 637: Performed by: INTERNAL MEDICINE

## 2021-10-14 PROCEDURE — 84132 ASSAY OF SERUM POTASSIUM: CPT | Performed by: PHYSICIAN ASSISTANT

## 2021-10-14 RX ORDER — GABAPENTIN 100 MG/1
100 CAPSULE ORAL 2 TIMES DAILY PRN
Status: DISCONTINUED | OUTPATIENT
Start: 2021-10-14 | End: 2021-10-16 | Stop reason: HOSPADM

## 2021-10-14 RX ORDER — ACYCLOVIR 400 MG/1
800 TABLET ORAL 2 TIMES DAILY
Status: DISCONTINUED | OUTPATIENT
Start: 2021-10-14 | End: 2021-10-16 | Stop reason: HOSPADM

## 2021-10-14 RX ORDER — ACETAMINOPHEN 325 MG/1
325-650 TABLET ORAL EVERY 4 HOURS PRN
Status: DISCONTINUED | OUTPATIENT
Start: 2021-10-14 | End: 2021-10-16 | Stop reason: HOSPADM

## 2021-10-14 RX ORDER — POTASSIUM CHLORIDE 29.8 MG/ML
20 INJECTION INTRAVENOUS
Status: COMPLETED | OUTPATIENT
Start: 2021-10-14 | End: 2021-10-14

## 2021-10-14 RX ORDER — DEXAMETHASONE 4 MG/1
8 TABLET ORAL DAILY
Status: COMPLETED | OUTPATIENT
Start: 2021-10-15 | End: 2021-10-16

## 2021-10-14 RX ORDER — ALBUTEROL SULFATE 90 UG/1
2 AEROSOL, METERED RESPIRATORY (INHALATION) EVERY 4 HOURS PRN
Status: DISCONTINUED | OUTPATIENT
Start: 2021-10-14 | End: 2021-10-16 | Stop reason: HOSPADM

## 2021-10-14 RX ORDER — DIPHENHYDRAMINE HCL 25 MG
25 CAPSULE ORAL ONCE
Status: COMPLETED | OUTPATIENT
Start: 2021-10-15 | End: 2021-10-15

## 2021-10-14 RX ORDER — LAMOTRIGINE 150 MG/1
300 TABLET ORAL DAILY
Status: DISCONTINUED | OUTPATIENT
Start: 2021-10-14 | End: 2021-10-14

## 2021-10-14 RX ORDER — HEPARIN SODIUM,PORCINE 10 UNIT/ML
3 VIAL (ML) INTRAVENOUS
Status: DISCONTINUED | OUTPATIENT
Start: 2021-10-14 | End: 2021-10-16 | Stop reason: HOSPADM

## 2021-10-14 RX ORDER — ALLOPURINOL 300 MG/1
300 TABLET ORAL ONCE
Status: COMPLETED | OUTPATIENT
Start: 2021-10-14 | End: 2021-10-14

## 2021-10-14 RX ORDER — ACETAMINOPHEN 325 MG/1
650 TABLET ORAL ONCE
Status: COMPLETED | OUTPATIENT
Start: 2021-10-15 | End: 2021-10-15

## 2021-10-14 RX ORDER — HYDROCORTISONE 2.5 %
CREAM (GRAM) TOPICAL 2 TIMES DAILY PRN
Status: DISCONTINUED | OUTPATIENT
Start: 2021-10-14 | End: 2021-10-16 | Stop reason: HOSPADM

## 2021-10-14 RX ORDER — LAMOTRIGINE 300 MG/1
300 TABLET, EXTENDED RELEASE ORAL AT BEDTIME
COMMUNITY

## 2021-10-14 RX ORDER — ACETAMINOPHEN 500 MG
500-1000 TABLET ORAL EVERY 8 HOURS PRN
COMMUNITY

## 2021-10-14 RX ORDER — PRAZOSIN HYDROCHLORIDE 1 MG/1
1 CAPSULE ORAL 3 TIMES DAILY
Status: DISCONTINUED | OUTPATIENT
Start: 2021-10-14 | End: 2021-10-15

## 2021-10-14 RX ORDER — DEXTROSE MONOHYDRATE 50 MG/ML
10-20 INJECTION, SOLUTION INTRAVENOUS
Status: DISCONTINUED | OUTPATIENT
Start: 2021-10-20 | End: 2021-10-16 | Stop reason: HOSPADM

## 2021-10-14 RX ORDER — LORAZEPAM 0.5 MG/1
.5-1 TABLET ORAL EVERY 4 HOURS PRN
Status: DISCONTINUED | OUTPATIENT
Start: 2021-10-14 | End: 2021-10-16 | Stop reason: HOSPADM

## 2021-10-14 RX ORDER — BUDESONIDE AND FORMOTEROL FUMARATE DIHYDRATE 160; 4.5 UG/1; UG/1
2 AEROSOL RESPIRATORY (INHALATION) 2 TIMES DAILY
Status: DISCONTINUED | OUTPATIENT
Start: 2021-10-14 | End: 2021-10-14 | Stop reason: CLARIF

## 2021-10-14 RX ORDER — MEPERIDINE HYDROCHLORIDE 25 MG/ML
25-50 INJECTION INTRAMUSCULAR; INTRAVENOUS; SUBCUTANEOUS
Status: DISCONTINUED | OUTPATIENT
Start: 2021-10-15 | End: 2021-10-16 | Stop reason: HOSPADM

## 2021-10-14 RX ORDER — PROCHLORPERAZINE MALEATE 5 MG
5-10 TABLET ORAL EVERY 6 HOURS PRN
Status: DISCONTINUED | OUTPATIENT
Start: 2021-10-14 | End: 2021-10-16 | Stop reason: HOSPADM

## 2021-10-14 RX ORDER — LORAZEPAM 2 MG/ML
.5-1 INJECTION INTRAMUSCULAR EVERY 4 HOURS PRN
Status: DISCONTINUED | OUTPATIENT
Start: 2021-10-14 | End: 2021-10-16 | Stop reason: HOSPADM

## 2021-10-14 RX ORDER — FLUCONAZOLE 200 MG/1
200 TABLET ORAL DAILY
Status: DISCONTINUED | OUTPATIENT
Start: 2021-10-14 | End: 2021-10-16 | Stop reason: HOSPADM

## 2021-10-14 RX ORDER — LAMOTRIGINE 100 MG/1
300 TABLET, EXTENDED RELEASE ORAL AT BEDTIME
Status: DISCONTINUED | OUTPATIENT
Start: 2021-10-14 | End: 2021-10-16 | Stop reason: HOSPADM

## 2021-10-14 RX ORDER — LORATADINE 10 MG/1
10 TABLET ORAL DAILY
Status: DISCONTINUED | OUTPATIENT
Start: 2021-10-14 | End: 2021-10-16 | Stop reason: HOSPADM

## 2021-10-14 RX ORDER — ONDANSETRON 8 MG/1
8 TABLET, FILM COATED ORAL EVERY 8 HOURS
Status: COMPLETED | OUTPATIENT
Start: 2021-10-14 | End: 2021-10-15

## 2021-10-14 RX ORDER — PANTOPRAZOLE SODIUM 40 MG/1
40 TABLET, DELAYED RELEASE ORAL DAILY
Status: DISCONTINUED | OUTPATIENT
Start: 2021-10-14 | End: 2021-10-16 | Stop reason: HOSPADM

## 2021-10-14 RX ORDER — POTASSIUM CHLORIDE 750 MG/1
40 TABLET, EXTENDED RELEASE ORAL ONCE
Status: DISCONTINUED | OUTPATIENT
Start: 2021-10-14 | End: 2021-10-16 | Stop reason: HOSPADM

## 2021-10-14 RX ORDER — ALBUTEROL SULFATE 1.25 MG/3ML
1.25 SOLUTION RESPIRATORY (INHALATION) EVERY 6 HOURS PRN
COMMUNITY

## 2021-10-14 RX ORDER — LEVOFLOXACIN 250 MG/1
250 TABLET, FILM COATED ORAL
Status: DISCONTINUED | OUTPATIENT
Start: 2021-10-14 | End: 2021-10-16 | Stop reason: HOSPADM

## 2021-10-14 RX ADMIN — POTASSIUM CHLORIDE 20 MEQ: 29.8 INJECTION, SOLUTION INTRAVENOUS at 16:34

## 2021-10-14 RX ADMIN — ALLOPURINOL 300 MG: 300 TABLET ORAL at 10:06

## 2021-10-14 RX ADMIN — PROCHLORPERAZINE EDISYLATE 10 MG: 5 INJECTION INTRAMUSCULAR; INTRAVENOUS at 15:48

## 2021-10-14 RX ADMIN — FLUCONAZOLE 200 MG: 200 TABLET ORAL at 15:41

## 2021-10-14 RX ADMIN — LORAZEPAM 1 MG: 0.5 TABLET ORAL at 20:50

## 2021-10-14 RX ADMIN — ACYCLOVIR 800 MG: 800 TABLET ORAL at 20:50

## 2021-10-14 RX ADMIN — ONDANSETRON HYDROCHLORIDE 8 MG: 8 TABLET, FILM COATED ORAL at 10:06

## 2021-10-14 RX ADMIN — LORATADINE 10 MG: 10 TABLET ORAL at 11:40

## 2021-10-14 RX ADMIN — ONDANSETRON HYDROCHLORIDE 8 MG: 8 TABLET, FILM COATED ORAL at 17:30

## 2021-10-14 RX ADMIN — ACETAMINOPHEN 650 MG: 325 TABLET, FILM COATED ORAL at 10:36

## 2021-10-14 RX ADMIN — DEXAMETHASONE 10 MG: 2 TABLET ORAL at 10:06

## 2021-10-14 RX ADMIN — PANTOPRAZOLE SODIUM 40 MG: 40 TABLET, DELAYED RELEASE ORAL at 08:01

## 2021-10-14 RX ADMIN — SODIUM CHLORIDE 1000 ML: 9 INJECTION, SOLUTION INTRAVENOUS at 10:07

## 2021-10-14 RX ADMIN — LAMOTRIGINE EXTENDED RELEASE 300 MG: 100 TABLET ORAL at 20:52

## 2021-10-14 RX ADMIN — Medication 600 MG: at 17:25

## 2021-10-14 RX ADMIN — POTASSIUM CHLORIDE 20 MEQ: 29.8 INJECTION, SOLUTION INTRAVENOUS at 15:42

## 2021-10-14 RX ADMIN — PRAZOSIN HYDROCHLORIDE 1 MG: 1 CAPSULE ORAL at 14:24

## 2021-10-14 RX ADMIN — MELPHALAN HYDROCHLORIDE 372 MG: KIT INTRAVENOUS at 11:11

## 2021-10-14 RX ADMIN — LEVOFLOXACIN 250 MG: 250 TABLET, FILM COATED ORAL at 15:42

## 2021-10-14 RX ADMIN — Medication 3 ML: at 20:56

## 2021-10-14 ASSESSMENT — ACTIVITIES OF DAILY LIVING (ADL)
ADLS_ACUITY_SCORE: 4
ADLS_ACUITY_SCORE: 12

## 2021-10-14 ASSESSMENT — MIFFLIN-ST. JEOR: SCORE: 1301

## 2021-10-14 NOTE — PROGRESS NOTES
SPIRITUAL HEALTH SERVICES  [facility] 5C  REFERRAL SOURCE: SW- interest in blessing     Introduced spiritual health services and provided blessing templates for pt to consider for pt's transplant on Friday 10/15     PLAN: Will return in afternoon to follow-up with pt about choice of blessing     Rev. Pam Fisher MDiv, [facility]  Staff    Pager [phone]  * [facility] remains available 24/7 for emergent requests/referrals, either by having the switchboard page the on-call  or by entering an ASAP/STAT consult in Epic (this will also page the on-call ).*

## 2021-10-14 NOTE — PROGRESS NOTES
Patient admitted to:   Admitted from: home  Arrived by: ambulatory   Reason for admission: transplant  Patient accompanied by: care giver   Belongings: with patient   Teaching: unit routines, medications  Skin double check completed by: Miguelina Piña RN/ Marylin Brar RN

## 2021-10-14 NOTE — CONSULTS
"Below is the pt's psychosocial assessment for the staff to review as needed.      CLINICAL SOCIAL WORK   PSYCHOSOCIAL ASSESSMENT  BLOOD AND MARROW TRANSPLANT SERVICE        Assessment completed on September 23, 2021 of living situation, support system, financial status, functional status, coping, stressors, need for resources and social work intervention provided as needed.  Information for this assessment was provided by Pt and friend report in addition to medical chart review and consultation with medical team.      Present at assessment: Patient, Mil Seals and Lindy \"Lori\" Nelson were present for this telephone assessment conducted by Michelle Fraser, BMT .      Diagnosis: Multiple Myeloma (MM)     Date of Diagnosis: 4/22/2021     Transplant type: Autologous     Donor: Autologous      Physician: Yohan Powers MD     Nurse Coordinator: Faith Simms RN     : JEMIMA Lyn, [facility]      Permanent Address:   [address] [address]     Local Address:   [place] [address]     Contact Information:  Pt Home Phone: [phone]- does not answer this #  Pt Cell Phone: [phone]  Pt Email: [email or web address]  Pt's Lori Damon Phone: [phone]        Presenting Information:  Mil is a 61 year old female diagnosed with MM who presents for evaluation for an autologous transplant at the [facility] ([facility]).  Pt was accompanied to today's telephone visit by her friend Lori.      Decision Making:   Self      Health Care Directive:   Will bring in copy. Mil has a completed document and they will bring in the copy for scanning.      Relationship Status:   Single      Special Lodging Needs: Local Lodging Needed. Mil will be staying [facility].     Family/Support System: Pt endorsed a good support system including family and close friends who will " be available to support Pt throughout transplant process.      Spouse: n/a     Children: Marco, Pelon, Rboin, Val- all live in [place]     Grandchildren: not discussed     Parents: Mother Amelia Valdivia- lives in [place]. Father is      Siblings: 1 adopted brother and 1 1/2 brother     Friends: Lori and Pamleonora Damon     Caregiver: TYRONE discussed with pt the caregiver role and expectation at length. Pt is agreeable to having a full time caregiver for a minimum of 30 days until cleared by the BMT physician. Pt's identified caregivers is Pam MarvinGene. Caregiver education and resources provided. No caregiver concerns identified. Pt and Lori confirmed understanding caregiving requirement, including driving restrictions, as discussed during psychosocial assessment.      Name & Numbers  Pam Damon [phone]     Transportation Mode:  Private Car . Pt is aware of driving restrictions post-BMT and the need for the caregiver is to drive until cleared to drive by the  BMT physician. SW provided information on parking info and monthly parking pass options. Pt will utilize the [employer] security shuttle for transportation to and from the [facility] and BMT Clinic/Hospital.     Insurance:  No Insurance issues identified.  Pt denied specific insurance concerns at this time. TYRONE reiterated information about the [employer]  should specific insurance questions arise as Pt moves through transplant process.      Mil does report that she has coverage through her insurance for lodging, transport, and food support, but reports that she has been getting mixed information on what this benefit will cover. TYRONE agreed to reach out and ask them to send an explanation of benefits for the pt to have.     Sources of Income:  Income concerns identified  Mil has no income at this time. She expressed lots of concerns regard making ends meet during the BMT process, especially if she needs to  pay for her lodging up front. TYRONE encouraged Pt to contact this SW for additional potential resources should financial situation change.      TYRONE provided the pt the [employer] application. She has also [facility] is [facility].      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  GAD7 assessment, score was 13, which indicates moderate signs of anxiety.     Mil shared she has been diagnosed with a mood disorder, anxiety and PTSD. She is currently taking Lamictal and Prazosin to help her mental health, she was also taking supplements including Joey's wart, omega-3 and d-3 as ways to help her anxiety. She had to stop these supplements due to the interaction of her chemotherapy. Mil has not been working with a therapist currently, but has in the past, she has struggled a bit find a new therapist that she connects with. Pt is open to working with a therapist again if she can find one. Mil shared that her past therapist did EMDR with her, but does not feel the relationship was positive for her. She reports a prior work supervisor who was trained in Somatic therapy and she felt this was more of the therapeutic style that she would appreciate.       Mil has also been working with a psychiatrist [facility] in [place], [place] who has been making medication recommendations for her and then her PCP has been officially prescribing the medications. Mil notes they did change her medication dose at her diagnosis, but Mil would be open to having these reassessed. We discussed looking for a local psychiatrist and she was open to this. TYRONE discussed this with the pt's BMT medical team who will place a referral for a psychiatrist visit.  "     Mil shares that her mental health has been good and bad, overall she feels she \"has not been present due to her grief\" she is not completely ready to explore this grief currently, but does feel this is a big impact for her quality of life. Mil describes herself as a introvert and find being home much easier for her, but she misses connection with family (impacted due to COVID) and feeling down by life a lot. She reports her mental health was significantly impacted during her chemo and found her anxiety much higher during this time. Mil shares that she does not reach out of she is needing support and tends to just live in her feeling which she knows is not a positive place for her.    Mil does report she tries to utilize a few established supports when she is feeling more anxiety including talking to family/friends, sleep, positive self-talk and getting outside.      We talked about how some patients may see an increase in feelings of anxiety or depression while hospitalized for extended periods along with isolation. Encouraged Mil and Lori to let us know if they are noticing an increase in symptoms. We talked about the variety of modalities available to use as coping mechanisms (including but not limited to guided imagery, relaxation techniques, progressive muscle relaxation, counseling/talk therapy and medication).     Chemical Use: No issues identified.  Mil denies the use of tobacco alcohol, marijuana or other drugs. Based on the information provided, there appear to be no specific risks or concerns identified at this time.      Trauma/Loss/Abuse History: Multiple losses associated with cancer diagnosis and treatment, including health, employment, changes to physical appearance, etc.      Spirituality:  Patient identifies with cosmo community. Mil describes her cosmo as a collection of things and find nature a source of connection for her. Mil is open to a blessing ceremony. " "     Coping: Pt noted that she is currently feeling \"anxious, nervous, ready to begin and scared\".  Pt shared that her main coping mechanisms are talking with friend/family and being outdoors.  Pt noted that she also tobin by positive self-talk. SW and Pt discussed additional positive coping mechanisms that Pt can utilize while in the hospital.      Caregiver Coping: caregiver not present for assessment      Education Provided: Transplant process expectations, Caregiver requirements, Caregiver self-care, Financial issues related to transplant, Financial resources/grants available, Common psychosocial stressors pre/post transplant, Support group(s) available, Tour/layout of the inpatient unit/non-use of cell phones, Hospital resources available, Web site information, Resources for transplant patients and their families as well as the Clinical Social Work role.      Interventions Provided: Supportive counseling and education      Recreation/Leisure Activities:  Mil shared she enjoys being with family/friends, watching TV and working on [place]     Plans for Hospital Stay Leisure:  While IP Mil plans to watch TV.      Assessment and Recommendations for Team:  Pt is a 61 year old female diagnosed with MM who is here undergoing preparation for a planned autologous transplant.      Pt is a pleasant and articulate female who feels comfortable communicating with the medical team. Pt has a limited, but strong support team who are involved.      Pt may benefit from ongoing psychosocial support in regards to coping with the adjustment to the BMT process. Pt's family may benefit from ongoing psychosocial support in regards to coping with the adjustment to the BMT process and may also benefit from attending the BMT Caregiver Support Group that meets weekly on the inpatient unit.      Pt has a good support system and a good caregiver plan. Pt verbalizes understanding of the transplant process and wanting to proceed. TYRONE " provided contact information and encouraged Pt to contact SW with questions, concerns, resources and for support. Per this assessment, I did not identify any barriers to this patient moving forward with transplant        Important Information:   - Mil would like to see psychiatry prior to starting-referral placed  - Mil is open to having a blessing ceremony  -Mil would like qian information  - Mil would like to establish with a new therapist  - Mil would like to check in with the dietician to discuss her intake.      Follow up Planned:   Initiate financial resources  Psychosocial support  Lodging referrals  Spiritual Health referral     JEMIMA Lyn, [facility]  Pager: [phone]  Phone: [phone]

## 2021-10-14 NOTE — PLAN OF CARE
"/77 (BP Location: Right arm)   Pulse 93   Temp 98.2  F (36.8  C) (Oral)   Resp 16   Ht 1.68 m (5' 6.14\")   Wt 72.2 kg (159 lb 2.8 oz)   SpO2 99%   BMI 25.58 kg/m     New admit to the unit around 0700, Vitals and weight done. Chemo released by charge nurse. Pt oriented to room and call light. Keep monitoring pt as ordered and notify MD with any new changes.   Problem: Adult Inpatient Plan of Care  Goal: Plan of Care Review  Outcome: No Change  Goal: Patient-Specific Goal (Individualized)  Outcome: No Change  Goal: Absence of Hospital-Acquired Illness or Injury  Outcome: No Change  Goal: Optimal Comfort and Wellbeing  Outcome: No Change  Goal: Readiness for Transition of Care  Outcome: No Change     "

## 2021-10-14 NOTE — PROGRESS NOTES
"CLINICAL NUTRITION SERVICES - ASSESSMENT NOTE     Nutrition Prescription    Malnutrition Status:    Patient does not meet two of the established criteria necessary for diagnosing malnutrition but is at risk for malnutrition    Recommendations already ordered by Registered Dietitian (RD):  Supplements PRN -- sending trial of [employer]    Future/Additional Recommendations:  Adjustment in snacks/supplements PRN    Monitor po intakes, wt trends     REASON FOR ASSESSMENT  Mil Seals is a/an 61 year old female assessed by the dietitian for Patient/Family Request    NUTRITION HISTORY  Visited with pt per request of SW while she was in clinic.   Pt declines having any questions/concerns for RD. Appetite has been variable depending on how she has been feeling.  Was able to eat a good breakfast this morning. Endorses stable weight.  After having days of poor po will have days where she eats a lot and feels that overall this balances out. Has supplements in apartment for after transplant.  Sending trial of [employer] protein supplement while in house otherwise declines questions/concerns at this time.    CURRENT NUTRITION ORDERS  Diet: High Kcal/High Protein  Intake/Tolerance: N/A -- just admitted, no food records yet available    LABS  Labs reviewed  Na 146H  Cl 112H  Euglycemia    MEDICATIONS  Medications reviewed  Dexamethasone  zofran    ANTHROPOMETRICS  Height: 168 cm (5' 6.142\")  Most Recent Weight: 72.2 kg (159 lb 2.8 oz)    IBW: 59.1 kg  BMI: 25.6 --  Overweight BMI 25-29.9  Weight History: No significant wt shifts  Wt Readings from Last 15 Encounters:   10/14/21 72.2 kg (159 lb 2.8 oz)   10/05/21 73.4 kg (161 lb 13.1 oz)   10/04/21 73.9 kg (163 lb)   10/04/21 73.9 kg (162 lb 14.7 oz)   10/01/21 74.1 kg (163 lb 4.8 oz)   09/24/21 73.9 kg (163 lb)   09/22/21 74.3 kg (163 lb 12.8 oz)   09/22/21 74.3 kg (163 lb 11.2 oz)   09/22/21 74.3 kg (163 lb 11.2 oz)   09/20/21 73.2 kg (161 lb 6.4 oz)   08/12/21 73.5 kg " (162 lb)   7/13/21           72.3 kg  1/8/21             68.5 kg    Dosing Weight: 62 kg (adjusted, 72.2 kg on 10/14 and IBW of 59.1 kg)    ASSESSED NUTRITION NEEDS  Estimated Energy Needs: 3157-6660 kcals/day (25 - 30 kcals/kg)  Justification: Maintenance  Estimated Protein Needs: 75-95 grams protein/day (1.2 - 1.5 grams of pro/kg)  Justification: Increased needs  Estimated Fluid Needs:  (1 mL/kcal)   Justification: Maintenance and Per provider pending fluid status    PHYSICAL FINDINGS  See malnutrition section below.  Limited with street clothes, hooded sweatshirt    MALNUTRITION  % Intake: Decreased intake does not meet criteria  % Weight Loss: Weight loss does not meet criteria  Subcutaneous Fat Loss: None observed on visual exam of facial region  Muscle Loss: None observed on visual exam of facial region  Fluid Accumulation/Edema: Does not meet criteria  Malnutrition Diagnosis: Patient does not meet two of the established criteria necessary for diagnosing malnutrition but is at risk for malnutrition    NUTRITION DIAGNOSIS  Predicted inadequate nutrient intake (calories, protein) related to potential to develop decreased appetite during transplant course vs menu fatigue with LOS      INTERVENTIONS  Implementation  Nutrition Education: Provided education on RD role and nutrition POC   Collaboration with other providers - 5C rounds  Medical food supplement therapy  - per above    Goals  Patient to consume % of nutritionally adequate meal trays TID, or the equivalent with supplements/snacks.     Monitoring/Evaluation  Progress toward goals will be monitored and evaluated per protocol.    Ora Blandon MS, RD, , CNSC, LD.  5C/BMT Pager:[phone]

## 2021-10-14 NOTE — H&P
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 willing to retry, Active infections: None  Rash with bactrim as child, she is willing to retry this    4. CARDIOVASCULAR    Risk of cardiomyopathy:  Baseline EF 3D LVEF volumetric analysis is 61.8%.    Risk of arrhythmia: Baseline EKG showed sinus rythym with sinus arrythmia    XCt=469, would recheck after starts levaquin/zofran      5. RESPIRATORY    Baseline PFTs: DLCO seble=97    Risk of respiratory complications: Frequent ambulation and incentive spirometer.    History of asthma    6. GI/NUTRITION    Ulcer prophylaxis: protonix    Risk of nausea/vomiting due to chemo/radiation: decadron and zofran scheduled    7. RENAL/ELECTROLYTES/    Risk of renal injury: IV hydration before and after melphalan    Electrolyte management: replace per sliding scale    8. DIABETES/ENDOCRINE    Risk of steroid-induced hyperglyemia: Monitor BG, sliding scale if needed      Bone health: calcium and vitamin D          thyroid: 9/24/2021:  Bilateral thyroid nodules. Among these, 1.1 cm left thyroid lobe meets the criteria for biopsy based on FDG avidity on recent PET/CT exam dated 9/21/2021. Per close note: t Since most thyroid cancers that arise in the context of multinodular thyroid are slow growing and this nodule was diagnosed incidentally, will proceed with transplant and biopsy this at count recovery, day 28 auto. Dr Joyner notified [place] by email to get biopsy scheduled then.    9. MUSCULOSKELETAL:  Has some left shoulder ache, not pain    Daily PT/OT as needed while inpatient    Cancer Rehab as needed outpatient    10. SYMPTOM MANAGEMENT    Nausea from chemo/radiation: Prochlorperazine, ondansetron, lorazepam.     Neuropathy:  She is on gabapentin    11. Mood:  lamictal as mood stabilizer and prasozin for her PTSD    Pam Brandon PA-C  [phone]

## 2021-10-14 NOTE — PLAN OF CARE
Care Coordination  Discharge Planning     Line Company:  No coverage for line care company. Please provide a two week heparin supply at discharge.   Referral made for line care:  No  IV medications needed at discharge:  None   Pharmacy concerns:  None    PT/OT/therapies recommended:  Has not been assessed yet - will watch for recs tomorrow  Referral made for PT/OT/therapies:  No    D/c location:  Will stay at [place], [place] ([address] [facility])  Has placement need been communicated to Social Work?  Yes    [place] Teaching time arranged with patient/caregiver:  Completed 10/14 with pt and Pam (with Pam's spouse Lori on speaker phone)  Notify nurse to schedule line care class/ DM teaching, prior to d/c:  PLC to provide repeat line care class for pt's caregiver-Pam, who was not present at the original teach. PLC will arrange for this to be done in clinic after hospital discharge (unable to arrange for prior to discharge)    Caregiver:  Pam Damon (friend) [phone]      Smiley Corado, RN, BSN  RN Care Coordinator - BMT  Ph [phone]  Pg [phone]

## 2021-10-14 NOTE — PROGRESS NOTES
BMT Teaching Flowsheet    Mil RICH Seals is a 61 year old female  The encounter diagnosis was Multiple myeloma, remission status unspecified (H).    Teaching Topic: Autologous stem cell transplant discharge teaching     Person(s) involved in teaching: Patient and Friend-Pam  Motivation Level  Asks Questions: Yes  Eager to Learn: Yes  Cooperative: Yes  Receptive (willing/able to accept information): Yes  Any cultural factors/Orthodoxy beliefs that may influence understanding or compliance? No    Patient and Caregiver demonstrates understanding of the following:  - Reason for the appointment, diagnosis and treatment plan: Yes  - Knowledge of proper use of medications and conditions for which they are ordered (with special attention to potential side effects or drug interactions): No, explain: Bedside RN to complete medication teaching with patient and caregiver prior to discharge.   - Which situations necessitate calling provider and whom to contact: Yes    Teaching concerns addressed: None identified    Proper use and care of (medical equipment, care aids, etc.) Yes  Pain management techniques: Yes  Patient instructed on hand hygiene: Yes  How and/when to access community resources: Yes    Infection Control:  Patient and Caregiver demonstrates understanding of the following:  Surgical procedure site care taught NA  Signs and symptoms of infection taught Yes  Wound care taught NA  Central venous catheter care taught No, explain: Patient and caregiver previously received teaching, and decline further need     Instructional Materials Used/Given:   Discharge teaching completed with patient and family. Written guidelines provided including clinic follow up, signs and symptoms to report, infection prevention, and contact list. Reviewed potential side effects s/p transplant and what to expect during recovery period. Discussed clinic routines. Discussed activities to avoid to prevent infections and mask guidelines.  [facility] home infusion for line care supplies. Pt and family verbalize understanding of material via teach back and all questions have been answered.    Time spent with patient: 60 minutes.    Specific Concerns: NA

## 2021-10-14 NOTE — PHARMACY-ADMISSION MEDICATION HISTORY
Admission Medication History Completed by Pharmacy    See [facility] Admission Navigator for allergy information, preferred outpatient pharmacy, prior to admission medications and immunization status.     Medication History Sources:     Patient    Dispense report    Changes made to PTA medication list (reason):    Added:   o Albuterol 1.25mg/3mL nebulizer solution, 1 vial every 6 hours as needed (per pt report)  o Acetaminophen 500mg tablets, 1-2 tablets every 8 hours as needed for pain (patient reports taking 2 tablets with growth factor shots and as needed for headaches)      Deleted:   o Hydrocortisone 2.5% cream, apply topically 2 times daily as needed for vaginal dryness (per pt report)  o Zoledronic acid 4mg injection (not patient administered)      Changed:   o Lactobacillus 1 tablet 3 times daily --> 2 tablets every morning and 2 tablets every evening    Additional Information:    Patient was a reliable historian.     She stopped taking most of her vitamins and nutritional supplements the last week of September in preparation for stem cell transplant per BMT clinic pharmacist recommendations (see PharmD note 9/23/21). She reported she tried stopping lactobacillus temporarily but then resumed taking it due to her gut symptoms, and also took a calcium supplement last week due to a low level. She reported using aloe vera juice and asked if that needed to be stopped as well. We discussed the significance of stopping nutritional supplements and antioxidants and she confirmed her understanding and stated she would stop these until 30 days after transplant. She asked about prune juice and we discussed that it is okay to take but to use judiciously due to expected diarrhea from melphalan.    She mentioned prazosin makes her tired and she had often been skipping her afternoon dose while she was working, but she expressed that now she thinks she may do better with the 3 times daily dosing as prescribed.      Prior to  Admission medications    Medication Sig Last Dose Taking? Auth Provider   acetaminophen (TYLENOL) 500 MG tablet Take 500-1,000 mg by mouth every 8 hours as needed for mild pain Past Week Yes Unknown, Entered By History   albuterol (ACCUNEB) 1.25 MG/3ML neb solution Take 1.25 mg by nebulization every 6 hours as needed for shortness of breath / dyspnea or wheezing 10/13/2021 Yes Unknown, Entered By History   albuterol (PROAIR HFA/PROVENTIL HFA/VENTOLIN HFA) 108 (90 Base) MCG/ACT inhaler Inhale 2 puffs into the lungs every 4 hours as needed for shortness of breath / dyspnea or wheezing 10/14/2021 at AM Yes Reported, Patient   Ascorbic Acid (VITAMIN C) 500 MG CHEW Take by mouth daily  Past Month Yes Reported, Patient   budesonide-formoterol (SYMBICORT) 160-4.5 MCG/ACT Inhaler Inhale 2 puffs into the lungs 2 times daily As needed 10/14/2021 at AM Yes Reported, Patient   calcium carbonate (OS-BRUNA) 1500 (600 Ca) MG tablet Take 1,530 mg by mouth 2 times daily (with meals)  Past Week Yes Reported, Patient   cholecalciferol (VITAMIN D3) 125 mcg (5000 units) capsule Take 2 capsules by mouth daily  Past Month Yes Reported, Patient   fish oil-omega-3 fatty acids 500 MG capsule Take 1,500 mg by mouth daily  9/26/2021 Yes Reported, Patient   gabapentin (NEURONTIN) 100 MG capsule Take 100 mg by mouth 2 times daily Taking prn  Past Month Yes Reported, Patient   glutamine 500 MG capsule Take 500 mg by mouth 2 times daily Past Month Yes Reported, Patient   heparin lock flush 10 UNIT/ML SOLN injection 5 mLs by Intracatheter route daily Please flush 5 ml into each lumen daily. 10/13/2021 Yes Ricki Joyner MD   Lactobacillus Acid-Pectin (LACTOBACILLUS ACIDOPHILUS) TABS Take 2 tablets by mouth 2 times daily  10/13/2021 Yes Reported, Patient   LamoTRIgine (LAMICTAL XR) 300 MG 24 hr tablet Take 300 mg by mouth At Bedtime 10/13/2021 at PM Yes Unknown, Entered By History   loratadine (CLARITIN) 10 MG tablet Take 10 mg by mouth At Bedtime   10/13/2021 at PM Yes Reported, Patient   LORazepam (ATIVAN) 0.5 MG tablet Take 0.5 mg by mouth nightly as needed for anxiety Unknown at Unknown time Yes Reported, Patient   Nutritional Supplements (ADULT NUTRITIONAL SUPPLEMENT + OR) DIM 900mg daily  Past Month Yes Reported, Patient   Nutritional Supplements (ADULT NUTRITIONAL SUPPLEMENT + OR) Take by mouth 2 times daily Tryphylla 12,000mg daily for gut health Past Month Yes Reported, Patient   Nutritional Supplements (ADULT NUTRITIONAL SUPPLEMENT + OR) Take by mouth daily Calm powder- magnesium/calcium supplement for sleep Past Week Yes Reported, Patient   ondansetron (ZOFRAN-ODT) 8 MG ODT tab Take 1 tablet (8 mg) by mouth every 8 hours as needed for nausea Past Week Yes Pattie Lyn PA-C   prazosin (MINIPRESS) 1 MG capsule Take 1 mg by mouth 3 times daily 10/13/2021 at PM Yes Reported, Patient   prochlorperazine (COMPAZINE) 10 MG tablet Take 10 mg by mouth every 6 hours as needed for nausea or vomiting  More than a month Yes Reported, Patient   Alcohol Swabs PADS 2 swab daily Use one swab per lumen to disinfect prior to flushing lumen each day.   Ricki Joyner MD       Date completed: 10/14/21    Medication history completed by: Artem Villa PharmD                Date completed: 10/14/21    Medication history completed by: Artem Villa [facility]

## 2021-10-14 NOTE — PROGRESS NOTES
Care Management Follow Up    Length of Stay (days): 0    Expected Discharge Date: 10/16/2021     Concerns to be Addressed: all concerns addressed in this encounter     Patient plan of care discussed at interdisciplinary rounds: Yes    Anticipated Discharge Disposition: Other (Comments) (Hotel)     Anticipated Discharge Services: None  Anticipated Discharge DME: None    Patient/family educated on Medicare website which has current facility and service quality ratings:    Education Provided on the Discharge Plan:    Patient/Family in Agreement with the Plan: yes    Referrals Placed by CM/SW:    Private pay costs discussed: Not applicable    Additional Information:  SW met the pt and her friend/caregiver Pam. Provided the pt an update on where things are at with her lodging. She has already booked her continued stay at the [facility]. We reviewed the current grants and provided her the next set of applications including LLS and UMF. Pt will complete these prior to her discharge tomorrow. The pt shared that she is happy to be admitted after a delay due to her insurance, but is a bit frustrated that everything seems to no go smoothly, but she is ready to be at the place she is today. CSW provided empathic listening, validation of concerns, and encouragement. CSW encouraged Pt to contact CSW for support, questions and/or resources.     JEMIMA Lyn, LICTYRONE  [facility]  Pager: [phone]  Phone: [phone]        TRENTON Glover

## 2021-10-15 ENCOUNTER — APPOINTMENT (OUTPATIENT)
Dept: OCCUPATIONAL THERAPY | Facility: CLINIC | Age: 61
End: 2021-10-15
Attending: PHYSICIAN ASSISTANT
Payer: MEDICAID

## 2021-10-15 LAB
ANION GAP SERPL CALCULATED.3IONS-SCNC: 2 MMOL/L (ref 3–14)
BASOPHILS # BLD AUTO: 0 10E3/UL (ref 0–0.2)
BASOPHILS NFR BLD AUTO: 0 %
BUN SERPL-MCNC: 9 MG/DL (ref 7–30)
CALCIUM SERPL-MCNC: 8.3 MG/DL (ref 8.5–10.1)
CHLORIDE BLD-SCNC: 111 MMOL/L (ref 94–109)
CO2 SERPL-SCNC: 27 MMOL/L (ref 20–32)
CREAT SERPL-MCNC: 0.58 MG/DL (ref 0.52–1.04)
EOSINOPHIL # BLD AUTO: 0 10E3/UL (ref 0–0.7)
EOSINOPHIL NFR BLD AUTO: 0 %
ERYTHROCYTE [DISTWIDTH] IN BLOOD BY AUTOMATED COUNT: 15.1 % (ref 10–15)
GFR SERPL CREATININE-BSD FRML MDRD: >90 ML/MIN/1.73M2
GLUCOSE BLD-MCNC: 115 MG/DL (ref 70–99)
HCT VFR BLD AUTO: 28.7 % (ref 35–47)
HGB BLD-MCNC: 9.5 G/DL (ref 11.7–15.7)
IMM GRANULOCYTES # BLD: 0 10E3/UL
IMM GRANULOCYTES NFR BLD: 0 %
LYMPHOCYTES # BLD AUTO: 0.5 10E3/UL (ref 0.8–5.3)
LYMPHOCYTES NFR BLD AUTO: 7 %
MAGNESIUM SERPL-MCNC: 1.9 MG/DL (ref 1.6–2.3)
MCH RBC QN AUTO: 30.5 PG (ref 26.5–33)
MCHC RBC AUTO-ENTMCNC: 33.1 G/DL (ref 31.5–36.5)
MCV RBC AUTO: 92 FL (ref 78–100)
MONOCYTES # BLD AUTO: 0.4 10E3/UL (ref 0–1.3)
MONOCYTES NFR BLD AUTO: 5 %
NEUTROPHILS # BLD AUTO: 6.6 10E3/UL (ref 1.6–8.3)
NEUTROPHILS NFR BLD AUTO: 88 %
NRBC # BLD AUTO: 0 10E3/UL
NRBC BLD AUTO-RTO: 0 /100
PLATELET # BLD AUTO: 275 10E3/UL (ref 150–450)
POTASSIUM BLD-SCNC: 4.4 MMOL/L (ref 3.4–5.3)
RBC # BLD AUTO: 3.11 10E6/UL (ref 3.8–5.2)
SODIUM SERPL-SCNC: 140 MMOL/L (ref 133–144)
WBC # BLD AUTO: 7.5 10E3/UL (ref 4–11)

## 2021-10-15 PROCEDURE — 999N000147 HC STATISTIC PT IP EVAL DEFER: Performed by: PHYSICAL THERAPIST

## 2021-10-15 PROCEDURE — 83735 ASSAY OF MAGNESIUM: CPT | Performed by: PHYSICIAN ASSISTANT

## 2021-10-15 PROCEDURE — 97530 THERAPEUTIC ACTIVITIES: CPT | Mod: GO

## 2021-10-15 PROCEDURE — 87081 CULTURE SCREEN ONLY: CPT | Performed by: PHYSICIAN ASSISTANT

## 2021-10-15 PROCEDURE — 250N000013 HC RX MED GY IP 250 OP 250 PS 637: Performed by: INTERNAL MEDICINE

## 2021-10-15 PROCEDURE — 258N000001 HC RX 258: Performed by: PHYSICIAN ASSISTANT

## 2021-10-15 PROCEDURE — 250N000013 HC RX MED GY IP 250 OP 250 PS 637: Performed by: PHYSICIAN ASSISTANT

## 2021-10-15 PROCEDURE — 250N000011 HC RX IP 250 OP 636: Performed by: INTERNAL MEDICINE

## 2021-10-15 PROCEDURE — 97165 OT EVAL LOW COMPLEX 30 MIN: CPT | Mod: GO

## 2021-10-15 PROCEDURE — 250N000011 HC RX IP 250 OP 636: Performed by: PHYSICIAN ASSISTANT

## 2021-10-15 PROCEDURE — 85014 HEMATOCRIT: CPT | Performed by: PHYSICIAN ASSISTANT

## 2021-10-15 PROCEDURE — 38241 TRANSPLT AUTOL HCT/DONOR: CPT | Performed by: INTERNAL MEDICINE

## 2021-10-15 PROCEDURE — 30243Y0 TRANSFUSION OF AUTOLOGOUS HEMATOPOIETIC STEM CELLS INTO CENTRAL VEIN, PERCUTANEOUS APPROACH: ICD-10-PCS | Performed by: INTERNAL MEDICINE

## 2021-10-15 PROCEDURE — 206N000001 HC R&B BMT UMMC

## 2021-10-15 PROCEDURE — 250N000012 HC RX MED GY IP 250 OP 636 PS 637: Performed by: INTERNAL MEDICINE

## 2021-10-15 PROCEDURE — 80048 BASIC METABOLIC PNL TOTAL CA: CPT | Performed by: PHYSICIAN ASSISTANT

## 2021-10-15 RX ORDER — PANTOPRAZOLE SODIUM 40 MG/1
40 TABLET, DELAYED RELEASE ORAL DAILY
Qty: 30 TABLET | Refills: 0 | Status: SHIPPED | OUTPATIENT
Start: 2021-10-16 | End: 2021-11-04

## 2021-10-15 RX ORDER — ACYCLOVIR 800 MG/1
800 TABLET ORAL 2 TIMES DAILY
Qty: 28 TABLET | Refills: 0 | Status: SHIPPED | OUTPATIENT
Start: 2021-10-15 | End: 2021-10-26

## 2021-10-15 RX ORDER — HEPARIN SODIUM (PORCINE) LOCK FLUSH IV SOLN 100 UNIT/ML 100 UNIT/ML
5 SOLUTION INTRAVENOUS
Status: CANCELLED | OUTPATIENT
Start: 2021-10-15

## 2021-10-15 RX ORDER — LEVOFLOXACIN 250 MG/1
250 TABLET, FILM COATED ORAL DAILY
Qty: 14 TABLET | Refills: 0 | Status: SHIPPED | OUTPATIENT
Start: 2021-10-15 | End: 2021-11-03

## 2021-10-15 RX ORDER — HEPARIN SODIUM,PORCINE 10 UNIT/ML
5 VIAL (ML) INTRAVENOUS DAILY
Qty: 140 ML | Refills: 0 | Status: SHIPPED | OUTPATIENT
Start: 2021-10-15

## 2021-10-15 RX ORDER — HEPARIN SODIUM,PORCINE 10 UNIT/ML
5 VIAL (ML) INTRAVENOUS
Status: CANCELLED | OUTPATIENT
Start: 2021-10-15

## 2021-10-15 RX ORDER — FLUCONAZOLE 200 MG/1
200 TABLET ORAL DAILY
Qty: 14 TABLET | Refills: 0 | Status: SHIPPED | OUTPATIENT
Start: 2021-10-16 | End: 2021-10-26

## 2021-10-15 RX ORDER — PRAZOSIN HYDROCHLORIDE 1 MG/1
1 CAPSULE ORAL 2 TIMES DAILY
Status: DISCONTINUED | OUTPATIENT
Start: 2021-10-15 | End: 2021-10-16 | Stop reason: HOSPADM

## 2021-10-15 RX ORDER — ONDANSETRON 8 MG/1
8 TABLET, FILM COATED ORAL EVERY 8 HOURS PRN
Status: DISCONTINUED | OUTPATIENT
Start: 2021-10-15 | End: 2021-10-16 | Stop reason: HOSPADM

## 2021-10-15 RX ORDER — ONDANSETRON 4 MG/1
4 TABLET, FILM COATED ORAL EVERY 4 HOURS PRN
Status: DISCONTINUED | OUTPATIENT
Start: 2021-10-15 | End: 2021-10-15

## 2021-10-15 RX ORDER — AMOXICILLIN 250 MG
1 CAPSULE ORAL 2 TIMES DAILY PRN
Status: DISCONTINUED | OUTPATIENT
Start: 2021-10-15 | End: 2021-10-16 | Stop reason: HOSPADM

## 2021-10-15 RX ADMIN — LORAZEPAM 1 MG: 0.5 TABLET ORAL at 01:09

## 2021-10-15 RX ADMIN — LORAZEPAM 0.5 MG: 0.5 TABLET ORAL at 12:29

## 2021-10-15 RX ADMIN — LORATADINE 10 MG: 10 TABLET ORAL at 07:38

## 2021-10-15 RX ADMIN — DIPHENHYDRAMINE HYDROCHLORIDE 25 MG: 25 CAPSULE ORAL at 11:31

## 2021-10-15 RX ADMIN — DEXTROSE AND SODIUM CHLORIDE 2000 ML: 5; 450 INJECTION, SOLUTION INTRAVENOUS at 07:01

## 2021-10-15 RX ADMIN — ONDANSETRON HYDROCHLORIDE 4 MG: 4 TABLET, FILM COATED ORAL at 13:06

## 2021-10-15 RX ADMIN — FLUCONAZOLE 200 MG: 200 TABLET ORAL at 11:33

## 2021-10-15 RX ADMIN — ONDANSETRON HYDROCHLORIDE 8 MG: 8 TABLET, FILM COATED ORAL at 02:31

## 2021-10-15 RX ADMIN — ACETAMINOPHEN 650 MG: 325 TABLET, FILM COATED ORAL at 11:31

## 2021-10-15 RX ADMIN — LORAZEPAM 0.5 MG: 0.5 TABLET ORAL at 14:00

## 2021-10-15 RX ADMIN — LORAZEPAM 0.5 MG: 0.5 TABLET ORAL at 07:38

## 2021-10-15 RX ADMIN — ACYCLOVIR 800 MG: 800 TABLET ORAL at 07:38

## 2021-10-15 RX ADMIN — ONDANSETRON HYDROCHLORIDE 4 MG: 8 TABLET, FILM COATED ORAL at 15:23

## 2021-10-15 RX ADMIN — PANTOPRAZOLE SODIUM 40 MG: 40 TABLET, DELAYED RELEASE ORAL at 07:38

## 2021-10-15 RX ADMIN — LAMOTRIGINE EXTENDED RELEASE 300 MG: 100 TABLET ORAL at 22:32

## 2021-10-15 RX ADMIN — LEVOFLOXACIN 250 MG: 250 TABLET, FILM COATED ORAL at 11:32

## 2021-10-15 RX ADMIN — DEXAMETHASONE 8 MG: 4 TABLET ORAL at 07:38

## 2021-10-15 RX ADMIN — DEXTROSE AND SODIUM CHLORIDE: 5; 450 INJECTION, SOLUTION INTRAVENOUS at 18:22

## 2021-10-15 RX ADMIN — LORAZEPAM 1 MG: 0.5 TABLET ORAL at 19:55

## 2021-10-15 RX ADMIN — ACYCLOVIR 800 MG: 800 TABLET ORAL at 19:55

## 2021-10-15 ASSESSMENT — ACTIVITIES OF DAILY LIVING (ADL)
ADLS_ACUITY_SCORE: 4
ADLS_ACUITY_SCORE: 4
ADLS_ACUITY_SCORE: 6
ADLS_ACUITY_SCORE: 4
PREVIOUS_RESPONSIBILITIES: MEAL PREP;HOUSEKEEPING;LAUNDRY;SHOPPING;MEDICATION MANAGEMENT;FINANCES;DRIVING
ADLS_ACUITY_SCORE: 4
ADLS_ACUITY_SCORE: 6

## 2021-10-15 ASSESSMENT — MIFFLIN-ST. JEOR: SCORE: 1300.22

## 2021-10-15 NOTE — SUMMARY OF CARE
Discharge Medications     Mil Krishnamurthy   Home Medication Instructions OLGA LIDIA:[id number]    Printed on:10/15/21 1322   Medication Information                      acetaminophen (TYLENOL) 500 MG tablet  Take 500-1,000 mg by mouth every 8 hours as needed for mild pain             acyclovir (ZOVIRAX) 800 MG tablet  Take 1 tablet (800 mg) by mouth 2 times daily             albuterol (ACCUNEB) 1.25 MG/3ML neb solution  Take 1.25 mg by nebulization every 6 hours as needed for shortness of breath / dyspnea or wheezing             albuterol (PROAIR HFA/PROVENTIL HFA/VENTOLIN HFA) 108 (90 Base) MCG/ACT inhaler  Inhale 2 puffs into the lungs every 4 hours as needed for shortness of breath / dyspnea or wheezing             budesonide-formoterol (SYMBICORT) 160-4.5 MCG/ACT Inhaler  Inhale 2 puffs into the lungs 2 times daily As needed             calcium carbonate (OS-BRUNA) 1500 (600 Ca) MG tablet  Take 1,530 mg by mouth 2 times daily (with meals)              cholecalciferol (VITAMIN D3) 125 mcg (5000 units) capsule  Take 2 capsules by mouth daily              fluconazole (DIFLUCAN) 200 MG tablet  Take 1 tablet (200 mg) by mouth daily             gabapentin (NEURONTIN) 100 MG capsule  Take 100 mg by mouth 2 times daily as needed for neuropathy               heparin lock flush 10 UNIT/ML SOLN injection  5 mLs by Intracatheter route daily   Please flush 5 ml into each lumen daily on days you do not come to clinic             LamoTRIgine (LAMICTAL XR) 300 MG 24 hr tablet  Take 300 mg by mouth At Bedtime             levofloxacin (LEVAQUIN) 250 MG tablet  Take 1 tablet (250 mg) by mouth daily             loratadine (CLARITIN) 10 MG tablet  Take 10 mg by mouth At Bedtime              LORazepam (ATIVAN) 0.5 MG tablet  Take 0.5 mg by mouth nightly as needed for anxiety             ondansetron (ZOFRAN-ODT) 8 MG ODT tab  Take 1 tablet (8 mg) by mouth every 8 hours as needed for nausea             pantoprazole (PROTONIX) 40 MG EC  tablet  Take 1 tablet (40 mg) by mouth daily             prazosin (MINIPRESS) 1 MG capsule  Take 1 mg by mouth 2 times daily

## 2021-10-15 NOTE — PLAN OF CARE
"  Blood pressure 102/63, pulse 97, temperature 98.3  F (36.8  C), temperature source Axillary, resp. rate 16, height 1.68 m (5' 6.14\"), weight 72.2 kg (159 lb 2.8 oz), SpO2 96 %.    Pt is day 0 today she requested Ativan at the beginning of the shift at midnight and slept well. She took  a shower and complete bed linen changed. CVC dressing changed and site looks clean. Pre transplant flush started  and pt will need CHG wipes today. Pt will be having 4 bags of transplant product today, she also needs C-diff stool. Continue to monitor pt and follow plan of care.      Problem: Adult Inpatient Plan of Care  Goal: Plan of Care Review  Outcome: No Change     Problem: Adjustment to Transplant (Stem Cell/Bone Marrow Transplant)  Goal: Optimal Coping with Transplant  Outcome: No Change     Problem: Diarrhea (Stem Cell/Bone Marrow Transplant)  Goal: Diarrhea Symptom Control  Outcome: No Change     Problem: Fatigue (Stem Cell/Bone Marrow Transplant)  Goal: Energy Level Supports Daily Activity  Outcome: No Change     Problem: Hematologic Alteration (Stem Cell/Bone Marrow Transplant)  Goal: Blood Counts Within Acceptable Range  Outcome: No Change     Problem: Infection Risk (Stem Cell/Bone Marrow Transplant)  Goal: Absence of Infection  Outcome: No Change     "

## 2021-10-15 NOTE — PLAN OF CARE
Pt nauseated today got ativan 0.5 mg x3 po ( she did not want 1mg this am as last night it made her too groggy) and zofran 4 mg po x2. Pt had transplant and urine was red now brown flush will continue until 0100, unless urine has blood still in it. Med box has been set up and ready for 1100 discharge. Pt still needs cdiff sent she says she feels constipated although she stated she went yesterday prior to admission.  Problem: Adult Inpatient Plan of Care  Goal: Plan of Care Review  Outcome: No Change  Flowsheets (Taken 10/15/2021 1857)  Plan of Care Reviewed With: patient  Progress: no change  Goal: Patient-Specific Goal (Individualized)  Outcome: No Change  Goal: Absence of Hospital-Acquired Illness or Injury  Outcome: No Change  Intervention: Identify and Manage Fall Risk  Recent Flowsheet Documentation  Taken 10/15/2021 0800 by Shannan Onofre RN  Safety Promotion/Fall Prevention:   assistive device/personal items within reach   nonskid shoes/slippers when out of bed  Intervention: Prevent Skin Injury  Recent Flowsheet Documentation  Taken 10/15/2021 0800 by Shannan Onofre RN  Body Position: position changed independently  Intervention: Prevent and Manage VTE (Venous Thromboembolism) Risk  Recent Flowsheet Documentation  Taken 10/15/2021 0800 by Shannan Onofre RN  VTE Prevention/Management: ambulation promoted  Goal: Optimal Comfort and Wellbeing  Outcome: No Change  Goal: Readiness for Transition of Care  Outcome: No Change     Problem: Adjustment to Transplant (Stem Cell/Bone Marrow Transplant)  Goal: Optimal Coping with Transplant  Outcome: No Change     Problem: Bladder Irritation (Stem Cell/Bone Marrow Transplant)  Goal: Symptom-Free Urinary Elimination  Outcome: No Change     Problem: Diarrhea (Stem Cell/Bone Marrow Transplant)  Goal: Diarrhea Symptom Control  Outcome: No Change     Problem: Fatigue (Stem Cell/Bone Marrow Transplant)  Goal: Energy Level Supports Daily Activity  Outcome: No  Change     Problem: Hematologic Alteration (Stem Cell/Bone Marrow Transplant)  Goal: Blood Counts Within Acceptable Range  Outcome: No Change     Problem: Hypersensitivity Reaction (Stem Cell/Bone Marrow Transplant)  Goal: Absence of Hypersensitivity Reaction  Outcome: No Change     Problem: Infection Risk (Stem Cell/Bone Marrow Transplant)  Goal: Absence of Infection  Outcome: No Change     Problem: Mucositis (Stem Cell/Bone Marrow Transplant)  Goal: Mucous Membrane Health and Integrity  Outcome: No Change     Problem: Nausea and Vomiting (Stem Cell/Bone Marrow Transplant)  Goal: Nausea and Vomiting Symptom Relief  Outcome: No Change     Problem: Nutrition Intake Altered (Stem Cell/Bone Marrow Transplant)  Goal: Optimal Nutrition Intake  Outcome: No Change     Problem: Discharge Planning  Goal: Discharge Planning (Adult, OB, Behavioral, Peds)  Outcome: No Change

## 2021-10-15 NOTE — PROGRESS NOTES
"BMT Daily Progress Note   10/15/2021    Patient ID:  Mil Seals is a 60 yo Day 0 for auto PBSCT for MM      Diagnosis MM Multiple myeloma  BMTCT Type Autologous    Prep Regimen Melphalan  Primary BMT MD Dr. Sridhar Cullen     Admission date: 10/14/2021    INTERVAL  HISTORY   Intermittent nausea, no emesis. No diarrhea. No fevers or URI sx. Mild line site tenderness.    Review of Systems: 10 point ROS negative except as noted above.      PHYSICAL EXAM     KPS: 80  BP (!) 145/81 (BP Location: Left arm)   Pulse 87   Temp 98.1  F (36.7  C) (Axillary)   Resp 16   Ht 1.68 m (5' 6.14\")   Wt 72.1 kg (159 lb)   SpO2 96%   BMI 25.55 kg/m       General: NAD   Eyes: sclera anicteric   Nose/Mouth/Throat: OP moist, no ulcerations   Lungs: CTA bilaterally  Cardiovascular: RRR, no M/R/G   Abdominal/Rectal: +BS, soft, NT, ND, No HSM   Lymphatics: No edema  Skin: No rashes or petechaie  Neuro: non-focal  Access: R chest CVC NT, dressing cdi         LABS AND IMAGING: I have assessed all abnormal lab values for their clinical significance and any values considered clinically significant have been addressed in the assessment and plan.          Lab Results   Component Value Date    WBC 7.5 10/15/2021    ANEU 39.7 (H) 10/08/2021    HGB 9.5 (L) 10/15/2021    HCT 28.7 (L) 10/15/2021     10/15/2021     10/15/2021    POTASSIUM 4.4 10/15/2021    CHLORIDE 111 (H) 10/15/2021    CO2 27 10/15/2021     (H) 10/15/2021    BUN 9 10/15/2021    CR 0.58 10/15/2021    MAG 1.9 10/15/2021    INR 0.99 10/14/2021    BILITOTAL 0.3 10/14/2021    AST 11 10/14/2021    ALT 24 10/14/2021    ALKPHOS 94 10/14/2021    PROTTOTAL 5.0 (L) 10/14/2021    ALBUMIN 2.6 (L) 10/14/2021           SYSTEMS-BASED ASSESSMENT AND PLAN      Mil Seals is a 60 yo Day 0 for auto PBSCT for MM      1. BMT/IEC PROTOCOL    MT 2016-35    Day-1 (10/14/2021) will get Melphalan and flush    Day 0 (10/15/2021) Transplant: will get half of her " collected cells:  Total cell dose=10.8 X10^6 CD34/kg  G-CSF starts on day+5 (10/20/2021)  Allopurinol, UA=3.3    2. HEME/COAG    Risk of cytopenias due to chemotherapy and radiation    Transfusion parameters: hemoglobin <7, platelets <10    Relevant thrombosis or bleeding history: none     3. IMMUNOCOMPROMISED    Vaccination status: Influenza vaccination after day +60, COVID vaccination after day +100, followed by remaining vaccinations at 12, 14, 24, and 26 months.    Prophylaxis plan: fluconazole (not listed as allergy but she thinks this made her feel bad but willing to try it), ACV (had some previous issues with acyclovir but willing to retry), Levaquin     4. CARDIOVASCULAR    Risk of cardiomyopathy:  Baseline EF 3D LVEF volumetric analysis is 61.8%.    Risk of arrhythmia: Baseline EKG showed sinus rythym with sinus arrythmia    BNd=194, consider recheck after starts levaquin/zofran        5. RESPIRATORY    Baseline PFTs: DLCO seble=97    Risk of respiratory complications: Frequent ambulation and incentive spirometer.    History of asthma     6. GI/NUTRITION    Ulcer prophylaxis: protonix    Risk of nausea/vomiting due to chemo/radiation: decadron and zofran scheduled     7. RENAL/ELECTROLYTES/    Risk of renal injury: IV hydration with chemo, transplant    Electrolyte management: replace per sliding scale     8. DIABETES/ENDOCRINE    Risk of steroid-induced hyperglyemia: Monitor BG, sliding scale if needed      Bone health: calcium and vitamin D         US thyroid: 9/24/2021: Bilateral thyroid nodules. Among these, 1.1 cm left thyroid lobe meets the criteria for biopsy based on FDG avidity on recent PET/CT exam dated 9/21/2021. Per close note: t Since most thyroid cancers that arise in the context of multinodular thyroid are slow growing and this nodule was diagnosed incidentally, will proceed with transplant and biopsy this at count recovery, day 28 auto. Dr Joyner notified [place] by email to get biopsy  scheduled then.     9. MUSCULOSKELETAL:  Has some left shoulder ache, not pain    Daily PT/OT as needed while inpatient    Cancer Rehab as needed outpatient     10. SYMPTOM MANAGEMENT    Nausea from chemo/radiation: Prochlorperazine, ondansetron, lorazepam.     Neuropathy:  She is on gabapentin     11. Mood:  lamictal as mood stabilizer and prasozin for her PTSD    Dispo: possible discharge tomorrow if stable; daily follow-up in BMT Clinic x 10/17. 1w of fluc, ACV sent in case she doesn't tolerate and needs alternate Rx (valtrex? Nystatin?)    Jazmin Aviles PA-C  [phone]

## 2021-10-15 NOTE — PROGRESS NOTES
10/15/21 0930   Quick Adds   Type of Visit Initial Occupational Therapy Evaluation   Living Environment   People in home alone   Current Living Arrangements house   Home Accessibility stairs to enter home;stairs within home   Number of Stairs, Main Entrance 2   Number of Stairs, Within Home, Primary 2   Transportation Anticipated car, drives self;family or friend will provide   Living Environment Comments pt lives alone in a house, reports there are 2 stairs to enter the home and 2-3 stairs within the home, otherwise everything is on one level.    Self-Care   Usual Activity Tolerance good   Current Activity Tolerance moderate   Regular Exercise Yes   Activity/Exercise Type other (see comments)  (yoga and dancing )   Exercise Amount/Frequency 3-5 times/wk   Equipment Currently Used at Home none   Activity/Exercise/Self-Care Comment Ptreports being very active at baseline btu has not been physically active since cancer diagnosis 2/2 fatigue.    Instrumental Activities of Daily Living (IADL)   Previous Responsibilities meal prep;housekeeping;laundry;shopping;medication management;finances;driving   IADL Comments IND with IADLs.    Disability/Function   Hearing Difficulty or Deaf no   Wear Glasses or Blind yes   Vision Management reading glasses   Concentrating, Remembering or Making Decisions Difficulty no   Difficulty Communicating no   Difficulty Eating/Swallowing no   Walking or Climbing Stairs Difficulty no   Dressing/Bathing Difficulty no   Toileting issues no   Doing Errands Independently Difficulty (such as shopping) no   Fall history within last six months no   Change in Functional Status Since Onset of Current Illness/Injury yes   General Information   Onset of Illness/Injury or Date of Surgery 10/14/21   Referring Physician  Pam Brandon PA-C   Patient/Family Therapy Goal Statement (OT) return home, gain functional strength, reduce fatigue    Additional Occupational Profile Info/Pertinent History of  Current Problem Per chart: Mil Seals is a 61 year old women who is Day-1 pre auto PBSCT for Multiple Myeloma.    Existing Precautions/Restrictions immunosuppressed   Left Upper Extremity (Weight-bearing Status) full weight-bearing (FWB)   Right Upper Extremity (Weight-bearing Status) full weight-bearing (FWB)   Left Lower Extremity (Weight-bearing Status) full weight-bearing (FWB)   Right Lower Extremity (Weight-bearing Status) full weight-bearing (FWB)   General Observations and Info Activity: ambulate    Cognitive Status Examination   Orientation Status orientation to person, place and time   Affect/Mental Status (Cognitive) WNL   Follows Commands WNL   Cognitive Status Comments No cognition concerns noted.    Visual Perception   Visual Impairment/Limitations WNL;corrective lenses for reading   Sensory   Sensory Comments Mild numbness and tingling in fingers and feet from chemo.    Pain Assessment   Patient Currently in Pain No   Integumentary/Edema   Integumentary/Edema no deficits were identifed   Posture   Posture not impaired   Range of Motion Comprehensive   General Range of Motion no range of motion deficits identified   Strength Comprehensive (MMT)   General Manual Muscle Testing (MMT) Assessment no strength deficits identified   Comment, General Manual Muscle Testing (MMT) Assessment WFL, 4/5    Coordination   Upper Extremity Coordination No deficits were identified   Gross Motor Coordination No deficits identified   Fine Motor Coordination Not tested    Clinical Impression   Criteria for Skilled Therapeutic Interventions Met (OT) yes;meets criteria;skilled treatment is necessary   OT Diagnosis Increased risk of deconditioning 2/2 BMT    OT Problem List-Impairments impacting ADL problems related to;activity tolerance impaired;strength  (fatigue)   Assessment of Occupational Performance 1-3 Performance Deficits   Identified Performance Deficits home management, community mobility    Planned  Therapy Interventions (OT) home program guidelines;progressive activity/exercise;strengthening;IADL retraining;ADL retraining   Clinical Decision Making Complexity (OT) low complexity   Therapy Frequency (OT) Daily  (x1-2 more sessions)   Predicted Duration of Therapy 10/17/2021   Risk & Benefits of therapy have been explained evaluation/treatment results reviewed;care plan/treatment goals reviewed;current/potential barriers reviewed;risks/benefits reviewed;participants voiced agreement with care plan;participants included;patient;friend   OT Discharge Planning    OT Discharge Recommendation (DC Rec) Home with assist;home with outpatient occupational therapy   OT Rationale for DC Rec Pt is SBA for all mobility and ADLs but is limited by fatigue, strength, and poor activity tolerance. Pt would benefit from further therapy to progress functional strength and endurance needed for all ADLs and IADLs in home environment. If pt is staying locally OP cancer rehab would be beneficial.    OT Brief overview of current status  SBA    Total Evaluation Time (Minutes)   Total Evaluation Time (Minutes) 4

## 2021-10-15 NOTE — PLAN OF CARE
AVSS. Patient received Melphalan. Did cryotherapy for several hours. Compazine x1 for nausea. Potassium replaced. Lactic 0.9. ativan given at bedtime. Need stool sample for VRE/C.Diff. needs central line dressing change but patient declines today and wants to wait til tomorrow. Transplant tomorrow noon and 4 bags. Continue POC.   Problem: Adult Inpatient Plan of Care  Goal: Plan of Care Review  Outcome: No Change     Problem: Adult Inpatient Plan of Care  Goal: Patient-Specific Goal (Individualized)  Outcome: No Change     Problem: Adult Inpatient Plan of Care  Goal: Absence of Hospital-Acquired Illness or Injury  Outcome: No Change     Problem: Adult Inpatient Plan of Care  Goal: Absence of Hospital-Acquired Illness or Injury  Intervention: Identify and Manage Fall Risk  Recent Flowsheet Documentation  Taken 10/14/2021 2100 by Miguelina Piña, RN  Safety Promotion/Fall Prevention: activity supervised  Taken 10/14/2021 0900 by Miguelina Piña, RN  Safety Promotion/Fall Prevention:   activity supervised   assistive device/personal items within reach     Problem: Adult Inpatient Plan of Care  Goal: Optimal Comfort and Wellbeing  Outcome: No Change     Problem: Adult Inpatient Plan of Care  Goal: Readiness for Transition of Care  Outcome: No Change     Problem: Adult Inpatient Plan of Care  Goal: Readiness for Transition of Care  Intervention: Mutually Develop Transition Plan  Recent Flowsheet Documentation  Taken 10/14/2021 1115 by Miguelina Piña, RN  Equipment Currently Used at Home: none     Problem: Adjustment to Transplant (Stem Cell/Bone Marrow Transplant)  Goal: Optimal Coping with Transplant  Outcome: No Change     Problem: Bladder Irritation (Stem Cell/Bone Marrow Transplant)  Goal: Symptom-Free Urinary Elimination  Outcome: No Change     Problem: Diarrhea (Stem Cell/Bone Marrow Transplant)  Goal: Diarrhea Symptom Control  Outcome: No Change     Problem: Fatigue (Stem Cell/Bone Marrow Transplant)  Goal:  Energy Level Supports Daily Activity  Outcome: No Change     Problem: Hematologic Alteration (Stem Cell/Bone Marrow Transplant)  Goal: Blood Counts Within Acceptable Range  Outcome: No Change     Problem: Hypersensitivity Reaction (Stem Cell/Bone Marrow Transplant)  Goal: Absence of Hypersensitivity Reaction  Outcome: No Change     Problem: Infection Risk (Stem Cell/Bone Marrow Transplant)  Goal: Absence of Infection  Outcome: No Change     Problem: Mucositis (Stem Cell/Bone Marrow Transplant)  Goal: Mucous Membrane Health and Integrity  Outcome: No Change     Problem: Nausea and Vomiting (Stem Cell/Bone Marrow Transplant)  Goal: Nausea and Vomiting Symptom Relief  Outcome: No Change     Problem: Nutrition Intake Altered (Stem Cell/Bone Marrow Transplant)  Goal: Optimal Nutrition Intake  Outcome: No Change     Problem: Discharge Planning  Goal: Discharge Planning (Adult, OB, Behavioral, Peds)  Outcome: No Change

## 2021-10-15 NOTE — SUMMARY OF CARE
BMT Summary of Care    October 15, 2021 1:08 PM  Mil Seals  MRN: [id number]    Discharge Date: 10/16/21    BMT Primary Physician: Yonathan Cullen    BMT Nurse Coordinator: Sandra Reis    Discharge Diagnosis: S/P BMT    Discharge To: [facility]    Activity: As tolerated    Catheter Care: Olmedo - Flush each line with 5mL of 10 unit/mL heparin every 24 hours    Vascular Access Device Protocol Per Policy  No coverage, will send 2 weeks pf heparin syringes at discharge      Nutrition: Regular diet as tolerated    Blood Transfusions:  Transfuse if Hemoglobin < or equal 7 mg/dL  Red Blood Cell Order: 1 units, irradiated and leukoreduced   Transfuse if Platelet count < or equal 10,000 uL  Platelet order: 1 adult dose, irradiated and leukoreduced  Transfusion Pre-meds:  As needed    Intravenous Electrolyte Replacement:  Potassium  chloride (give only if serum creatinine < 2)     3-3.3  20mEq/hr  over 1 hour x 2 doses     <3      20mEq/hr  over 1 hour x 3 doses  Magnesium sulfate 1.3-1.7     2 grams over 1 hour x1 dose                                     <1.3         2 grams over 2 hours x1 dose    Outpatient Pharmacy:  G-CSF to be given in clinic: (dose) 480mg daily starting Wed 10/20 and cont until ANC > 2500 x 2 days    Laboratory Tests:  At next clinic appointment (date: 10/17/21)  Hemogram (CBC) differential, platelet count  Basic Metabolic Panel    Support Services:  None    Appointments:   BMT Clinic (date, time, provider):   1. Solo 10/17 9:00am for labs and provider visit  2. Monday 10/18 10:45am for labs, provider visit, pharm D appt (bring meds and pillbox to this appt)      Marylin Araujo PA-C      BMT Contact Information:-   For issues including fevers 100.5 or more:  Please call during the week: Monday through Friday between hours of 8:00 am and 4:30 pm- Call BMT office- [phone]  After hours/Weekends: Please call [facility] :  [phone] and ask for BMT physician on call and the  will have the BMT physician call you back.

## 2021-10-15 NOTE — PROGRESS NOTES
..Type of transplant: Donor: Autologous  Product:      BMT INFUSION DOCUMENTATION (last 48 hours)      BMT/Cellular Product Infusion     Row Name 10/15/21 0800                [REMOVED] Product 10/15/21 1011 HPC, Apheresis    Product Details Product Release Date: 10/15/21  -NB Product Release Time: 1011 -LL Product Type: HPC, Apheresis  -NB DIN: [id number]  -NB Product Description Code: [id number]  -NB Volume Dispensed (mL): 70 mL  -NB Completion Date (RN to complete): 10/15/21  -KG Completion Time (RN to complete): 1215  -KG    Checked by (Patient RN)  Shannan Onofre RN  -KG       Checked by (Witness)  Obdulia CespedesLR       Product Volume Infused (mL)  70 mL  -KG       Flush Volume (mL)  30 mL  -KG       Volume Dispensed (mL)  --          [REMOVED] Product 10/15/21 1011 HPC, Apheresis    Product Details Product Release Date: 10/15/21  -NB Product Release Time: 1011 -LL Product Type: HPC, Apheresis  -NB DIN: [id number]  -NB Product Description Code: [id number]  -NB Volume Dispensed (mL): 70 mL  -NB Completion Date (RN to complete): 10/15/21  -KG Completion Time (RN to complete): 1227  -KG    Checked by (Patient RN)  Shannan Onofre RN  -KG       Checked by (Witness)  Obdulia HERNANDEZ       Product Volume Infused (mL)  70 mL  -KG       Flush Volume (mL)  30 mL  -KG       Volume Dispensed (mL)  --          [REMOVED] Product 10/15/21 1011 HPC, Apheresis    Product Details Product Release Date: 10/15/21  -NB Product Release Time: 1011 -LL Product Type: HPC, Apheresis  -NB DIN: [id number]  -NB Product Description Code: [id number]  -NB Volume Dispensed (mL): 80 mL  -NB Completion Date (RN to complete): 10/15/21  -KG Completion Time (RN to complete): 1241  -KG    Checked by (Patient RN)  Shannan Onofre RN  -KG       Checked by (Witness)  Obdulia Kaur RN  -LR       Product Volume Infused (mL)  80 mL  -KG       Flush Volume (mL)  30 mL  -KG       Volume Dispensed (mL)  --          [REMOVED] Product 10/15/21 [facility],  Apheresis    Product Details Product Release Date: 10/15/21  -NB Product Type: HPC, Apheresis  -NB DIN: [id number]  -NB Product Description Code: [id number]  -NB Volume Dispensed (mL): 70 mL  -NB Completion Date (RN to complete): 10/15/21  -KG Completion Time (RN to complete): 1256  -KG    Checked by (Patient RN)  Shannan Onofre RN  -KG       Checked by (Witness)  Obdulia Kaur RN  -LR       Product Volume Infused (mL)  70 mL  -KG       Flush Volume (mL)  30 mL  -KG       Volume Dispensed (mL)  --         User Key  (r) = Recorded By, (t) = Taken By, (c) = Cosigned By    Initials Name Effective Dates    KG Shannan Onofre RN 04/29/14 -     LL Chelsie Jeffery 07/11/21 -     Obdulia Arceo RN 04/29/14 -     MIREYA Gillis Jihan J 07/11/21 -         Preparation: RN Documentation  Patient was premedicated as ordered: yes  Line Type: central line, right  Patient Stable Prior to Infusion: yes  Time Infusion Started: 1207  Teaching: side effects/monitoring  Tolerated/Reaction: Patient tolerance of product infusion  Immediate suspected transfusion reaction to the product: none  Did patient have prior history of similar signs/symptoms during this hospitalization?: NA  Did the patient tolerate the infusion well: yes  Flush until:0100  Plan:monitor vs one hour post transplant and continue flush for 12 hours post

## 2021-10-15 NOTE — PROGRESS NOTES
Care Management Discharge Note    Discharge Date: 10/16/2021       Discharge Disposition: Other (Comments) (Hotel)    Discharge Services: None    Discharge DME: None    Discharge Transportation: car, drives self, family or friend will provide    Private pay costs discussed: Not applicable    PAS Confirmation Code:  n/a  Patient/family educated on Medicare website which has current facility and service quality ratings:      Education Provided on the Discharge Plan:  discharge packet provided and reviewed with the pt.  Persons Notified of Discharge Plans: Pt, friend Pam  Patient/Family in Agreement with the Plan: yes    Handoff Referral Completed: No    Additional Information:  Per medical team it will likely be ready to discharge Sat 10/16/21. The pt will return to the [facility] in [place] with Pam as her caregiver. Reviewed with the pt she could drop the grants off at the clinic once she completes them. Also requested she email the contact information to CSW for the hot to help coordinate qian payments. CSW provided empathic listening, validation of concerns, and encouragement. CSW encouraged Pt to contact CSW for support, questions and/or resources.     SW also placed another call to Afsaneh [phone] at [facility] to confirm pt's discharge and start date for lodging reimbursement.       ADDENDUM: 15:43  Afsaneh called back and noted the pt's approval number for lodging starting 10/16/21-10/31/21 is [id number]. Reimbursement max per day is $70.    JEMIMA Lyn, [facility]  Pager: [phone]  Phone: [phone]

## 2021-10-15 NOTE — PROGRESS NOTES
"SPIRITUAL HEALTH SERVICES  [facility] ([place]) 5C  REFERRAL SOURCE: Follow-up     Pt was eating breakfast. Pt's friend Pam was present.  Pt stated \"I'm feeling overwhelmed, I had a bad night and I've decided not do a blessing. It's too much.\"    Affirmed and validated pt's feelings and choices on her transplant day.  Instructed pt how to request a future spiritual health visit through her nurse     PLAN:  remains available per pt/family/staff request.     Rev. Pam Fisher MDiv, [facility]  Staff    Pager [phone]  * [facility] remains available 24/7 for emergent requests/referrals, either by having the switchboard page the on-call  or by entering an ASAP/STAT consult in Epic (this will also page the on-call ).*   "
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
## 2021-10-15 NOTE — PLAN OF CARE
Care Coordination  Discharge Planning     Line Company:  No coverage for line care company. Please provide a two week heparin supply at discharge.   Referral made for line care:  No  IV medications needed at discharge:  None   Pharmacy concerns:  None    PT/OT/therapies recommended:  Has not been assessed yet - will watch for recs tomorrow  Referral made for PT/OT/therapies:  No    D/c location:  [facility] stay at [facility] [place] [place] ([address] [facility])  Has placement need been communicated to Social Work?  Yes    [place] Teaching time arranged with patient/caregiver:  Completed 10/14 with pt and Pam (with Pam's spouse Lori on speaker phone)  Notify nurse to schedule line care class/ DM teaching, prior to d/c:  PLC to provide repeat line care class for pt's caregiver-Pam, who was not present at the original teach. PLC plans to call pt on Mon 10/18 to arrange for this to be done in clinic (unable to arrange inpatient before discharge)    Caregiver:  Pam Damon (friend) [phone]      Smiley Corado, RN, BSN  RN Care Coordinator - BMT  Ph [phone]  Pg [phone]

## 2021-10-15 NOTE — PLAN OF CARE
PT 5C: DEFER- Orders received for PT evaluation. Per discussion with OT, pt is up independently. No IP PT needs during short admission for autologous BMT. PT orders completed.

## 2021-10-15 NOTE — PROCEDURES
BMT/Cellular Autologous Product Infusion         Patient Vitals for the past 24 hrs:   Temp Temp src Pulse Resp BP   10/14/21 1216 97.5  F (36.4  C) Oral 89 18 127/69   10/14/21 1515 96.9  F (36.1  C) Oral 94 18 129/63   10/14/21 1619 98.1  F (36.7  C) Oral 105 18 123/65   10/14/21 1859 97.9  F (36.6  C) Oral 109 18 (!) 140/76   10/15/21 0010 97.5  F (36.4  C) Axillary 98 16 125/68   10/15/21 0555 98.3  F (36.8  C) Axillary 97 16 102/63   10/15/21 0823 97.9  F (36.6  C) Oral 97 16 117/67   10/15/21 1143 97.9  F (36.6  C) Axillary 94 16 115/66         BMT INFUSION DOCUMENTATION (last 48 hours)      BMT/Cellular Product Infusion     Row Name                  Product 10/15/21 1011 HPC, Apheresis    Product Details Product Release Date: 10/15/21  -NB Product Release Time: 1011  -LL Product Type: HPC, Apheresis  -NB DIN: [id number]  -NB Product Description Code: [id number]  -NB Volume Dispensed (mL): 70 mL  -NB    Checked by (Patient RN)  --       Checked by (Witness)  --       Product Volume Infused (mL)  --       Flush Volume (mL)  --       Volume Dispensed (mL)  --          Product 10/15/21 1011 HPC, Apheresis    Product Details Product Release Date: 10/15/21  -NB Product Release Time: 1011  -LL Product Type: HPC, Apheresis  -NB DIN: [id number]  -NB Product Description Code: [id number]  -NB Volume Dispensed (mL): 70 mL  -NB    Checked by (Patient RN)  --       Checked by (Witness)  --       Product Volume Infused (mL)  --       Flush Volume (mL)  --       Volume Dispensed (mL)  --          Product 10/15/21 1011 HPC, Apheresis    Product Details Product Release Date: 10/15/21  -NB Product Release Time: 1011  -LL Product Type: HPC, Apheresis  -NB DIN: [id number]  -NB Product Description Code: [id number]  -NB Volume Dispensed (mL): 80 mL  -NB    Checked by (Patient RN)  --       Checked by (Witness)  --       Product Volume Infused (mL)  --       Flush Volume (mL)  --       Volume Dispensed (mL)  --           Product 10/15/21 HPC, Apheresis    Product Details Product Release Date: 10/15/21  -NB Product Type: HPC, Apheresis  -NB DIN: [id number]  -NB Product Description Code: [id number]  -NB Volume Dispensed (mL): 70 mL  -NB    Checked by (Patient RN)  --       Checked by (Witness)  --       Product Volume Infused (mL)  --       Flush Volume (mL)  --       Volume Dispensed (mL)  --         User Key  (r) = Recorded By, (t) = Taken By, (c) = Cosigned By    Initials Name Effective Dates    LL Chelsie Jeffery 07/11/21 -     NB Jihan Gillis 07/11/21 -         Allogeneic Donor Eligibility Determination and Summary of Records: N/A - Autologous        Type of Infusion: Autologous      Baseline Pre-Infusion Evaluation (to be completed by Provider):   Dyspnea: Grade 0 - none  Hypoxia: Grade 0 - not present  Fever: Grade 0 - afebrile  Chills: Grade 0 - none  Febrile Neutropenia: Grade 0 - not present  Sinus Bradycardia: Grade 0 - none  Hypertension: Grade 1 - prehypertension (systolic -139 mm Hg or diastolic BP 80-89 mm Hg)  Hypotension: Grade 0 - none  Chest Pain: Grade 0 - none  Bronchospasm: Grade 0 - none  Pain: Grade 0 - none  Rash: Grade 0 - None  Neurologic Specify: none    If adverse reactions, events or complications occur (fever greater than 2 degrees fahrenheit increase, and severe reactions of the following types: chills, dyspnea, bronchospasm, hyper/hypotension, hypoxia, bradycardia, chest pain, back/flank pain, hypoxia, and any other reaction deemed severe or life threatening; any instance of product bag breakage or unusual product appearance)    Any other events that are >= grade 3, then immediately contact the BMT Attending physician, the Cell Therapy Laboratory Medical Director (pager [phone]) and the Cell Therapy Laboratory ([phone]).  After midnight, holidays & weekends contact the [facility] on the appropriate campus ([facility] [place]: [phone];   [facility]: [phone]).    ZULEIKA GarciaC

## 2021-10-16 VITALS
HEIGHT: 66 IN | HEART RATE: 97 BPM | BODY MASS INDEX: 25.78 KG/M2 | TEMPERATURE: 97.9 F | OXYGEN SATURATION: 94 % | SYSTOLIC BLOOD PRESSURE: 119 MMHG | RESPIRATION RATE: 16 BRPM | WEIGHT: 160.4 LBS | DIASTOLIC BLOOD PRESSURE: 69 MMHG

## 2021-10-16 LAB
ABO/RH(D): ABNORMAL
ANION GAP SERPL CALCULATED.3IONS-SCNC: 4 MMOL/L (ref 3–14)
ANTIBODY SCREEN, TUBE: NORMAL
ANTIBODY SCREEN: POSITIVE
BASOPHILS # BLD AUTO: 0 10E3/UL (ref 0–0.2)
BASOPHILS NFR BLD AUTO: 0 %
BUN SERPL-MCNC: 13 MG/DL (ref 7–30)
CALCIUM SERPL-MCNC: 8.2 MG/DL (ref 8.5–10.1)
CHLORIDE BLD-SCNC: 110 MMOL/L (ref 94–109)
CO2 SERPL-SCNC: 26 MMOL/L (ref 20–32)
CREAT SERPL-MCNC: 0.55 MG/DL (ref 0.52–1.04)
EOSINOPHIL # BLD AUTO: 0 10E3/UL (ref 0–0.7)
EOSINOPHIL NFR BLD AUTO: 0 %
ERYTHROCYTE [DISTWIDTH] IN BLOOD BY AUTOMATED COUNT: 15.6 % (ref 10–15)
GFR SERPL CREATININE-BSD FRML MDRD: >90 ML/MIN/1.73M2
GLUCOSE BLD-MCNC: 134 MG/DL (ref 70–99)
HCT VFR BLD AUTO: 27.7 % (ref 35–47)
HGB BLD-MCNC: 8.9 G/DL (ref 11.7–15.7)
IMM GRANULOCYTES # BLD: 0.1 10E3/UL
IMM GRANULOCYTES NFR BLD: 1 %
LYMPHOCYTES # BLD AUTO: 0.3 10E3/UL (ref 0.8–5.3)
LYMPHOCYTES NFR BLD AUTO: 3 %
MAGNESIUM SERPL-MCNC: 2.1 MG/DL (ref 1.6–2.3)
MCH RBC QN AUTO: 30.3 PG (ref 26.5–33)
MCHC RBC AUTO-ENTMCNC: 32.1 G/DL (ref 31.5–36.5)
MCV RBC AUTO: 94 FL (ref 78–100)
MONOCYTES # BLD AUTO: 0.2 10E3/UL (ref 0–1.3)
MONOCYTES NFR BLD AUTO: 3 %
NEUTROPHILS # BLD AUTO: 8.4 10E3/UL (ref 1.6–8.3)
NEUTROPHILS NFR BLD AUTO: 93 %
NRBC # BLD AUTO: 0 10E3/UL
NRBC BLD AUTO-RTO: 0 /100
PLATELET # BLD AUTO: 306 10E3/UL (ref 150–450)
POTASSIUM BLD-SCNC: 4.4 MMOL/L (ref 3.4–5.3)
RBC # BLD AUTO: 2.94 10E6/UL (ref 3.8–5.2)
SODIUM SERPL-SCNC: 140 MMOL/L (ref 133–144)
SPECIMEN EXPIRATION DATE: ABNORMAL
SPECIMEN EXPIRATION DATE: NORMAL
WBC # BLD AUTO: 8.9 10E3/UL (ref 4–11)

## 2021-10-16 PROCEDURE — 250N000013 HC RX MED GY IP 250 OP 250 PS 637: Performed by: PHYSICIAN ASSISTANT

## 2021-10-16 PROCEDURE — 80048 BASIC METABOLIC PNL TOTAL CA: CPT | Performed by: PHYSICIAN ASSISTANT

## 2021-10-16 PROCEDURE — 86850 RBC ANTIBODY SCREEN: CPT | Performed by: INTERNAL MEDICINE

## 2021-10-16 PROCEDURE — 86922 COMPATIBILITY TEST ANTIGLOB: CPT

## 2021-10-16 PROCEDURE — 85025 COMPLETE CBC W/AUTO DIFF WBC: CPT | Performed by: PHYSICIAN ASSISTANT

## 2021-10-16 PROCEDURE — 250N000011 HC RX IP 250 OP 636: Performed by: INTERNAL MEDICINE

## 2021-10-16 PROCEDURE — 36592 COLLECT BLOOD FROM PICC: CPT | Performed by: PHYSICIAN ASSISTANT

## 2021-10-16 PROCEDURE — 250N000012 HC RX MED GY IP 250 OP 636 PS 637: Performed by: INTERNAL MEDICINE

## 2021-10-16 PROCEDURE — 86901 BLOOD TYPING SEROLOGIC RH(D): CPT | Performed by: INTERNAL MEDICINE

## 2021-10-16 PROCEDURE — 86850 RBC ANTIBODY SCREEN: CPT | Performed by: PHYSICIAN ASSISTANT

## 2021-10-16 PROCEDURE — 250N000013 HC RX MED GY IP 250 OP 250 PS 637: Performed by: STUDENT IN AN ORGANIZED HEALTH CARE EDUCATION/TRAINING PROGRAM

## 2021-10-16 PROCEDURE — 99239 HOSP IP/OBS DSCHRG MGMT >30: CPT | Performed by: INTERNAL MEDICINE

## 2021-10-16 PROCEDURE — 83735 ASSAY OF MAGNESIUM: CPT | Performed by: INTERNAL MEDICINE

## 2021-10-16 PROCEDURE — 250N000011 HC RX IP 250 OP 636: Performed by: PHYSICIAN ASSISTANT

## 2021-10-16 RX ADMIN — PANTOPRAZOLE SODIUM 40 MG: 40 TABLET, DELAYED RELEASE ORAL at 09:12

## 2021-10-16 RX ADMIN — DOCUSATE SODIUM 50 MG AND SENNOSIDES 8.6 MG 1 TABLET: 8.6; 5 TABLET, FILM COATED ORAL at 04:37

## 2021-10-16 RX ADMIN — DEXAMETHASONE 8 MG: 4 TABLET ORAL at 09:14

## 2021-10-16 RX ADMIN — ACYCLOVIR 800 MG: 800 TABLET ORAL at 09:14

## 2021-10-16 RX ADMIN — Medication 3 ML: at 00:27

## 2021-10-16 RX ADMIN — LORAZEPAM 1 MG: 0.5 TABLET ORAL at 04:23

## 2021-10-16 RX ADMIN — LORAZEPAM 1 MG: 0.5 TABLET ORAL at 09:12

## 2021-10-16 RX ADMIN — ONDANSETRON HYDROCHLORIDE 8 MG: 8 TABLET, FILM COATED ORAL at 00:27

## 2021-10-16 RX ADMIN — LORATADINE 10 MG: 10 TABLET ORAL at 09:14

## 2021-10-16 RX ADMIN — ONDANSETRON HYDROCHLORIDE 8 MG: 8 TABLET, FILM COATED ORAL at 09:20

## 2021-10-16 RX ADMIN — FLUCONAZOLE 200 MG: 200 TABLET ORAL at 09:14

## 2021-10-16 ASSESSMENT — MIFFLIN-ST. JEOR: SCORE: 1306.57

## 2021-10-16 ASSESSMENT — ACTIVITIES OF DAILY LIVING (ADL)
ADLS_ACUITY_SCORE: 4

## 2021-10-16 NOTE — PROGRESS NOTES
"Discharge SBAR:    Situation:  Mil Seals is a 61 year old being discharged to: Local Lodging: Residence [facility]  Admission reason: Scheduled Admission  Is this a readmission? No  Discharge time: 1035  Discharge barrier if discharged after 11AM: NA    Background:  Primary diagnosis: Multiple Myeloma  /69 (BP Location: Left arm)   Pulse 97   Temp 97.9  F (36.6  C) (Axillary)   Resp 16   Ht 1.68 m (5' 6.14\")   Wt 72.8 kg (160 lb 6.4 oz)   SpO2 94%   BMI 25.78 kg/m    Type of donor: Autologous  Type of stem cells: PBSC  Relapsed? No  Falls Precautions? No  Isolation? No  DNR? No  DNI? No  Confidential Patient? No  Positive blood cultures? No    Assessment:  Discharge teaching    Review discharge medications and schedule: Yes    Set up pill box: Yes    Discharge instructions reviewed: Yes    Special considerations (think about previous reactions, issues with flushing CVC, premedication needs, etc): Yes: Line care teaching through Patient Learning center to be set up on Monday.  Pt highly anxious and tends to refuse scheduled medications (calcium, vit D, prazosin).  Pt highly anxious around Fluconazole and Acyclovir because she thought she had previous reactions to those medications, but she was unable to clearly state what those reactions were.  MD prescribed a 2 week supply of Acyclovir and Fluconazole as a trial.      Patient Concerns: Yes: Being able to take all schedule meds because it's so many pills and frequent nausea.  Herbal medication recommended to stop by pharmacy due to potential liver toxicity and affects platelets.      Recommendations:  Anticipated needs:    Daily infusions: No    Daily transfusions: No    G-CSF: Yes. Starting 10/20/21    Other: Not Applicable  Verbal report called to clinic: No      "

## 2021-10-16 NOTE — PLAN OF CARE
Occupational Therapy Discharge Summary    Reason for therapy discharge:    Discharged to home with outpatient therapy.    Progress towards therapy goal(s). See goals on Care Plan in [facility] electronic health record for goal details.  Goals partially met.  Barriers to achieving goals:   discharge from facility.    Therapy recommendation(s):    Continued therapy is recommended.  Rationale/Recommendations:  Pt is SBA for all mobility and ADLs but is limited by fatigue, strength, and poor activity tolerance. Pt would benefit from further therapy to progress functional strength and endurance needed for all ADLs and IADLs in home environment. If pt is staying locally OP cancer rehab would be beneficial. .  No further therapy is recommended.

## 2021-10-16 NOTE — PLAN OF CARE
"/56 (BP Location: Right arm)   Pulse 94   Temp 97.8  F (36.6  C) (Oral)   Resp 16   Ht 1.68 m (5' 6.14\")   Wt 72.1 kg (159 lb)   SpO2 95%   BMI 25.55 kg/m      Remains afebrile, VSS on RA. Urine is now a clear yellow - flush shut off at 0100. Intermittently nauseated, received 1mg Ativan x2 and Zofran x1 with relief. Likes aromatherapy. Poor appetite but forcing herself to eat. Senna given x1 for mild constipation, no BM overnight. No replacements needed this AM, CVC hep locked. Plan is for discharge today, medications ready.    Problem: Bladder Irritation (Stem Cell/Bone Marrow Transplant)  Goal: Symptom-Free Urinary Elimination  Outcome: Improving     Problem: Nausea and Vomiting (Stem Cell/Bone Marrow Transplant)  Goal: Nausea and Vomiting Symptom Relief  Outcome: Improving     Problem: Adult Inpatient Plan of Care  Goal: Plan of Care Review  Outcome: No Change  Goal: Optimal Comfort and Wellbeing  Outcome: No Change     Problem: Adjustment to Transplant (Stem Cell/Bone Marrow Transplant)  Goal: Optimal Coping with Transplant  Outcome: No Change     Problem: Nutrition Intake Altered (Stem Cell/Bone Marrow Transplant)  Goal: Optimal Nutrition Intake  Outcome: No Change     "

## 2021-10-16 NOTE — PROGRESS NOTES
"BMT Daily Progress Note   10/16/2021    Patient ID:  Mil Seals is a 62 yo Day +1 for auto PBSCT for MM      Diagnosis MM Multiple myeloma  BMTCT Type Autologous    Prep Regimen Melphalan  Primary BMT MD Dr. Sridhar Cullen     Admission date: 10/14/2021    INTERVAL  HISTORY   Ready to discharge today. Transplant went okay--some nausea/dizzyness.     Review of Systems: 10 point ROS negative except as noted above.      PHYSICAL EXAM     KPS: 80  /69 (BP Location: Left arm)   Pulse 97   Temp 97.9  F (36.6  C) (Axillary)   Resp 16   Ht 1.68 m (5' 6.14\")   Wt 72.8 kg (160 lb 6.4 oz)   SpO2 94%   BMI 25.78 kg/m       General: NAD   Eyes: sclera anicteric   Nose/Mouth/Throat: OP moist, no ulcerations   Lungs: CTA bilaterally  Cardiovascular: RRR, no M/R/G   Lymphatics: No edema  Skin: No rashes or petechaie  Neuro: non-focal  Access: R chest CVC NT, dressing cdi      LABS AND IMAGING: I have assessed all abnormal lab values for their clinical significance and any values considered clinically significant have been addressed in the assessment and plan.          Lab Results   Component Value Date    WBC 8.9 10/16/2021    ANEU 39.7 (H) 10/08/2021    HGB 8.9 (L) 10/16/2021    HCT 27.7 (L) 10/16/2021     10/16/2021     10/16/2021    POTASSIUM 4.4 10/16/2021    CHLORIDE 110 (H) 10/16/2021    CO2 26 10/16/2021     (H) 10/16/2021    BUN 13 10/16/2021    CR 0.55 10/16/2021    MAG 2.1 10/16/2021    INR 0.99 10/14/2021    BILITOTAL 0.3 10/14/2021    AST 11 10/14/2021    ALT 24 10/14/2021    ALKPHOS 94 10/14/2021    PROTTOTAL 5.0 (L) 10/14/2021    ALBUMIN 2.6 (L) 10/14/2021        SYSTEMS-BASED ASSESSMENT AND PLAN      Mil Seals is a 62 yo Day +1 for auto PBSCT for MM      1. BMT/IEC PROTOCOL    MT 2016-35    Day-1 (10/14/2021) will get Melphalan and flush    Day 0 (10/15/2021) Transplant: will get half of her collected cells:  Total cell dose=10.8 X10^6 CD34/kg  G-CSF starts on day+5 " (10/20/2021)  Allopurinol, UA=3.3    2. HEME/COAG    Risk of cytopenias due to chemotherapy and radiation    Transfusion parameters: hemoglobin <7, platelets <10    Relevant thrombosis or bleeding history: none     3. IMMUNOCOMPROMISED    Vaccination status: Influenza vaccination after day +60, COVID vaccination after day +100, followed by remaining vaccinations at 12, 14, 24, and 26 months.    Prophylaxis plan: fluconazole (not listed as allergy but she thinks this made her feel bad but willing to try it), ACV (had some previous issues with acyclovir but willing to retry), Levaquin     4. CARDIOVASCULAR    Risk of cardiomyopathy:  Baseline EF 3D LVEF volumetric analysis is 61.8%.    Risk of arrhythmia: Baseline EKG showed sinus rythym with sinus arrythmia    OHg=394, consider recheck after starts levaquin/zofran        5. RESPIRATORY    Baseline PFTs: DLCO seble=97    Risk of respiratory complications: Frequent ambulation and incentive spirometer.    History of asthma     6. GI/NUTRITION    Ulcer prophylaxis: protonix    Risk of nausea/vomiting due to chemo/radiation: decadron and zofran scheduled     7. RENAL/ELECTROLYTES/    Risk of renal injury: IV hydration with chemo, transplant    Electrolyte management: replace per sliding scale     8. DIABETES/ENDOCRINE    Risk of steroid-induced hyperglyemia: Monitor BG, sliding scale if needed      Bone health: calcium and vitamin D         US thyroid: 9/24/2021: Bilateral thyroid nodules. Among these, 1.1 cm left thyroid lobe meets the criteria for biopsy based on FDG avidity on recent PET/CT exam dated 9/21/2021. Per close note: t Since most thyroid cancers that arise in the context of multinodular thyroid are slow growing and this nodule was diagnosed incidentally, will proceed with transplant and biopsy this at count recovery, day 28 auto. Dr Joyner notified [facility] by email to get biopsy scheduled then.     9. MUSCULOSKELETAL:  Has some left shoulder ache, not  pain    Daily PT/OT as needed while inpatient    Cancer Rehab as needed outpatient     10. SYMPTOM MANAGEMENT    Nausea from chemo/radiation: Prochlorperazine, ondansetron, lorazepam.     Neuropathy:  She is on gabapentin     11. Mood:  lamictal as mood stabilizer and prasozin for her PTSD    Disposition: Discharge today with daily clinic f/u.    Sondra Valerio PA-C  [phone]

## 2021-10-16 NOTE — PROGRESS NOTES
BMT Post Infusion Documentation    Data   Patient Vitals for the past 72 hrs:   Temp Temp src Pulse Resp BP   10/14/21 0638 98.2  F (36.8  C) Oral 93 16 125/77   10/14/21 1216 97.5  F (36.4  C) Oral 89 18 127/69   10/14/21 1515 96.9  F (36.1  C) Oral 94 18 129/63   10/14/21 1619 98.1  F (36.7  C) Oral 105 18 123/65   10/14/21 1859 97.9  F (36.6  C) Oral 109 18 (!) 140/76   10/15/21 0010 97.5  F (36.4  C) Axillary 98 16 125/68   10/15/21 0555 98.3  F (36.8  C) Axillary 97 16 102/63   10/15/21 0823 97.9  F (36.6  C) Oral 97 16 117/67   10/15/21 1143 97.9  F (36.6  C) Axillary 94 16 115/66   10/15/21 1201 97.5  F (36.4  C) Axillary 102 16 120/68   10/15/21 1220 98  F (36.7  C) Axillary 98 16 124/70   10/15/21 1228 98.1  F (36.7  C) Axillary 96 16 123/68   10/15/21 1241 98.2  F (36.8  C) Axillary 95 16 127/69   10/15/21 1256 97.9  F (36.6  C) Axillary 98 16 133/76   10/15/21 1400 97.9  F (36.6  C) Axillary 91 16 (!) 148/83   10/15/21 1516 98.1  F (36.7  C) Axillary 87 16 (!) 145/81   10/15/21 2011 98.4  F (36.9  C) Axillary 98 16 (!) 144/76   10/15/21 2235 97.8  F (36.6  C) Oral 94 16 118/56   10/16/21 0410 98.3  F (36.8  C) Oral 91 16 115/61   10/16/21 0740 97.9  F (36.6  C) Axillary 97 16 119/69        BMT INFUSION DOCUMENTATION (last 24 hours)      BMT/Cellular Product Infusion     Row Name                  Cell Therapy Documentation    Product Release Date  --       Recipient Study ID  --       Donor  --       Donor MRN/ID  --       Donor ABO/Rh  --       Allogeneic Donor Eligibility Determination and Summary of Records  --       Type of Infusion  --       Total Volume Dispensed (mL)  --       Total NC Dose  --       Total CD34 Dose  --       Total CD3 dose  --       Total NC Dose Left in Storage  --       Comments for Product Issues  --       Donor ABO/Rh  --       Product Types  --       Product Numbers  --       Product Types and Numbers  --       Volume  --       ABO Mismatch  --       ZZTotal NC Dose  --        ZZTotal CD34 Dose  --       ZZTotal NC Dose Left in Storage  --          [REMOVED] Product 10/15/21 1011 HPC, Apheresis    Product Details Product Release Date: 10/15/21  -NB Product Release Time: 1011  -LL Product Type: HPC, Apheresis  -NB DIN: [id number]  -NB Product Description Code: [id number]  -NB Volume Dispensed (mL): 70 mL  -NB Completion Date (RN to complete): 10/15/21  -KG Completion Time (RN to complete): 1215  -KG    Checked by (Patient RN)  --       Checked by (Witness)  --       Product Volume Infused (mL)  --       Flush Volume (mL)  --       Volume Dispensed (mL)  --          [REMOVED] Product 10/15/21 1011 HPC, Apheresis    Product Details Product Release Date: 10/15/21  -NB Product Release Time: 1011  -LL Product Type: HPC, Apheresis  -NB DIN: [id number]  -NB Product Description Code: [id number]  -NB Volume Dispensed (mL): 70 mL  -NB Completion Date (RN to complete): 10/15/21  -KG Completion Time (RN to complete): 1227  -KG    Checked by (Patient RN)  --       Checked by (Witness)  --       Product Volume Infused (mL)  --       Flush Volume (mL)  --       Volume Dispensed (mL)  --          [REMOVED] Product 10/15/21 1011 HPC, Apheresis    Product Details Product Release Date: 10/15/21  -NB Product Release Time: 1011  -LL Product Type: HPC, Apheresis  -NB DIN: [id number]  -NB Product Description Code: [id number]  -NB Volume Dispensed (mL): 80 mL  -NB Completion Date (RN to complete): 10/15/21  -KG Completion Time (RN to complete): 1241  -KG    Checked by (Patient RN)  --       Checked by (Witness)  --       Product Volume Infused (mL)  --       Flush Volume (mL)  --       Volume Dispensed (mL)  --          [REMOVED] Product 10/15/21 HPC, Apheresis    Product Details Product Release Date: 10/15/21  -NB Product Type: HPC, Apheresis  -NB DIN: [id number]  -NB Product Description Code: [id number]  -NB Volume Dispensed (mL): 70 mL  -NB Completion Date (RN to complete): 10/15/21  -KG Completion  Time (RN to complete): 1256  -KG    Checked by (Patient RN)  --       Checked by (Witness)  --       Product Volume Infused (mL)  --       Flush Volume (mL)  --       Volume Dispensed (mL)  --          RN Documentation    Patient was premedicated as ordered  --       Line Type  --       Patient Stable Prior to Infusion  --       Time Infusion Started  --       Checked by (Patient RN)  --       Checked by (RN 2)  --       Broken Bag?  --       Immediate suspected transfusion reaction to the product  --       Time Infusion Stopped  --       Total Flush Volume (mL)  --       Checked by (Witness)  --       Date Infusion Started  --       Date Infusion Stopped  --       Volume Infused (mL)  --       Total Volume Infused (cc)  --          Patient tolerance of product infusion    Immediate suspected transfusion reaction to the product  --       Did patient have prior history of similar signs/symptoms during this hospitalization?  --       Symptoms during/after infusion  --       Did the patient tolerate the infusion well  --       Medications and treatment for symptoms  --       Did the symptoms resolve?  --       Enter comments if clots, leaks, broken bag, infusion delays, other issues with bag/infusion  --       Describe symptoms  --         User Key  (r) = Recorded By, (t) = Taken By, (c) = Cosigned By    Initials Name Effective Dates    KG Shannan Onofre RN 04/29/14 -     Chelsie Santana 07/11/21 -     Jihan Narayanan 07/11/21 -             Post-Infusion Evaluation:   Infusion Related Reaction: Grade 0 - none  Dyspnea: Grade 0 - none  Hypoxia: Grade 0 - not present  Fever: Grade 0 - afebrile  Chills: Grade 0 - none  Febrile Neutropenia: Grade 0 - not present  Sinus Bradycardia: Grade 0 - none  Hypertension: Grade 2 - stage 1 hypertension (systolic -159 mm Hg or diastolic BP 90-99 mm Hg); medical intervention indicated; recurrent or persistent (>/ 24 hours); symptomatic increase by >/ 20 mm Hg (diastolic)  or to > 140/90 mm Hg if previously WNL; monotherapy indicated  Hypotension: Grade 0 - none  Chest Pain: Grade 0 - none  Bronchospasm: Grade 0 - none  Pain: Grade 0 - none  Rash: Grade 0 - None  Neurologic Specify: none    If this was a cord blood transplant, was more than one cord blood unit infused? Not applicable    Sondra Valerio PA-C

## 2021-10-17 ENCOUNTER — APPOINTMENT (OUTPATIENT)
Dept: LAB | Facility: CLINIC | Age: 61
End: 2021-10-17
Attending: INTERNAL MEDICINE
Payer: MEDICAID

## 2021-10-17 ENCOUNTER — INFUSION THERAPY VISIT (OUTPATIENT)
Dept: TRANSPLANT | Facility: CLINIC | Age: 61
End: 2021-10-17
Attending: INTERNAL MEDICINE
Payer: MEDICAID

## 2021-10-17 VITALS
BODY MASS INDEX: 26.12 KG/M2 | DIASTOLIC BLOOD PRESSURE: 73 MMHG | WEIGHT: 162.5 LBS | TEMPERATURE: 98.6 F | OXYGEN SATURATION: 97 % | RESPIRATION RATE: 16 BRPM | HEART RATE: 91 BPM | SYSTOLIC BLOOD PRESSURE: 111 MMHG

## 2021-10-17 DIAGNOSIS — C90.00 MULTIPLE MYELOMA, REMISSION STATUS UNSPECIFIED (H): ICD-10-CM

## 2021-10-17 DIAGNOSIS — T45.1X5A CHEMOTHERAPY-INDUCED NAUSEA: Primary | ICD-10-CM

## 2021-10-17 DIAGNOSIS — Z94.81 S/P BONE MARROW TRANSPLANT (H): ICD-10-CM

## 2021-10-17 DIAGNOSIS — R11.0 CHEMOTHERAPY-INDUCED NAUSEA: Primary | ICD-10-CM

## 2021-10-17 DIAGNOSIS — Z94.81 S/P BONE MARROW TRANSPLANT (H): Primary | ICD-10-CM

## 2021-10-17 LAB
ANION GAP SERPL CALCULATED.3IONS-SCNC: 3 MMOL/L (ref 3–14)
BACTERIA SPEC CULT: NORMAL
BASOPHILS # BLD AUTO: 0 10E3/UL (ref 0–0.2)
BASOPHILS NFR BLD AUTO: 0 %
BUN SERPL-MCNC: 18 MG/DL (ref 7–30)
CALCIUM SERPL-MCNC: 8.3 MG/DL (ref 8.5–10.1)
CHLORIDE BLD-SCNC: 108 MMOL/L (ref 94–109)
CO2 SERPL-SCNC: 28 MMOL/L (ref 20–32)
CREAT SERPL-MCNC: 0.64 MG/DL (ref 0.52–1.04)
EOSINOPHIL # BLD AUTO: 0 10E3/UL (ref 0–0.7)
EOSINOPHIL NFR BLD AUTO: 0 %
ERYTHROCYTE [DISTWIDTH] IN BLOOD BY AUTOMATED COUNT: 15.2 % (ref 10–15)
GFR SERPL CREATININE-BSD FRML MDRD: >90 ML/MIN/1.73M2
GLUCOSE BLD-MCNC: 109 MG/DL (ref 70–99)
HCT VFR BLD AUTO: 28 % (ref 35–47)
HGB BLD-MCNC: 9.3 G/DL (ref 11.7–15.7)
IMM GRANULOCYTES # BLD: 0.1 10E3/UL
IMM GRANULOCYTES NFR BLD: 1 %
LYMPHOCYTES # BLD AUTO: 0.2 10E3/UL (ref 0.8–5.3)
LYMPHOCYTES NFR BLD AUTO: 2 %
MCH RBC QN AUTO: 30.7 PG (ref 26.5–33)
MCHC RBC AUTO-ENTMCNC: 33.2 G/DL (ref 31.5–36.5)
MCV RBC AUTO: 92 FL (ref 78–100)
MONOCYTES # BLD AUTO: 0.1 10E3/UL (ref 0–1.3)
MONOCYTES NFR BLD AUTO: 1 %
NEUTROPHILS # BLD AUTO: 8.8 10E3/UL (ref 1.6–8.3)
NEUTROPHILS NFR BLD AUTO: 96 %
NRBC # BLD AUTO: 0 10E3/UL
NRBC BLD AUTO-RTO: 0 /100
PLATELET # BLD AUTO: 347 10E3/UL (ref 150–450)
POTASSIUM BLD-SCNC: 3.9 MMOL/L (ref 3.4–5.3)
RBC # BLD AUTO: 3.03 10E6/UL (ref 3.8–5.2)
SODIUM SERPL-SCNC: 139 MMOL/L (ref 133–144)
WBC # BLD AUTO: 9.1 10E3/UL (ref 4–11)

## 2021-10-17 PROCEDURE — 250N000011 HC RX IP 250 OP 636: Performed by: INTERNAL MEDICINE

## 2021-10-17 PROCEDURE — 80048 BASIC METABOLIC PNL TOTAL CA: CPT

## 2021-10-17 PROCEDURE — 258N000003 HC RX IP 258 OP 636: Performed by: INTERNAL MEDICINE

## 2021-10-17 PROCEDURE — 85025 COMPLETE CBC W/AUTO DIFF WBC: CPT

## 2021-10-17 PROCEDURE — 99213 OFFICE O/P EST LOW 20 MIN: CPT

## 2021-10-17 PROCEDURE — 36592 COLLECT BLOOD FROM PICC: CPT

## 2021-10-17 RX ORDER — SCOLOPAMINE TRANSDERMAL SYSTEM 1 MG/1
1 PATCH, EXTENDED RELEASE TRANSDERMAL
Status: DISCONTINUED | OUTPATIENT
Start: 2021-10-17 | End: 2021-10-17 | Stop reason: CLARIF

## 2021-10-17 RX ORDER — SCOLOPAMINE TRANSDERMAL SYSTEM 1 MG/1
1 PATCH, EXTENDED RELEASE TRANSDERMAL
Qty: 4 PATCH | Refills: 1 | Status: SHIPPED | OUTPATIENT
Start: 2021-10-17 | End: 2021-10-20

## 2021-10-17 RX ORDER — HEPARIN SODIUM,PORCINE 10 UNIT/ML
5 VIAL (ML) INTRAVENOUS ONCE
Status: COMPLETED | OUTPATIENT
Start: 2021-10-17 | End: 2021-10-17

## 2021-10-17 RX ADMIN — Medication 5 ML: at 09:25

## 2021-10-17 RX ADMIN — SODIUM CHLORIDE 1000 ML: 9 INJECTION, SOLUTION INTRAVENOUS at 10:23

## 2021-10-17 ASSESSMENT — PAIN SCALES - GENERAL: PAINLEVEL: NO PAIN (0)

## 2021-10-17 NOTE — LETTER
10/17/2021         RE: Mil Seals  [address]  [facility] [address]        Dear Colleague,    Thank you for referring your patient, Mil Seals, to the [facility] BLOOD AND MARROW TRANSPLANT PROGRAM [place]. Please see a copy of my visit note below.    BMT/Cell Therapy Daily Progress Note   10/17/2021    Patient ID:  Mil Seals is a 61 year old female, currently day +2 of her therapy.     Diagnosis MM Multiple myeloma  BMTCT Type Autologous    Prep Regimen Melphalan  Donor Source Self    GVHD Prophylaxis No data was found  Primary BMT MD Sridhar Cullen   Clinical Trials   [id number]           INTERVAL  HISTORY     Feels weak, low energy, down overall.  A lot of nausea; had oral lorazepam and ondansetron 2 hours ago and could take in some fluids and a banana.  Interested in drinking coconut water and aloe vera juice to settle stomach.    Review of Systems: 10 point ROS negative except as noted above.      PHYSICAL EXAM     Weight In/Out     Wt Readings from Last 3 Encounters:   10/17/21 73.7 kg (162 lb 8 oz)   10/16/21 72.8 kg (160 lb 6.4 oz)   10/05/21 73.4 kg (161 lb 13.1 oz)             KPS:  70    /73   Pulse 91   Temp 98.6  F (37  C) (Oral)   Resp 16   Wt 73.7 kg (162 lb 8 oz)   SpO2 97%   BMI 26.12 kg/m       General: NAD   Eyes: : FRANK, sclera anicteric   Nose/Mouth/Throat: OP clear, buccal mucosa moist, no ulcerations   Lungs: Normal effort, no wheeze  Cardiovascular: No JVD   Abdominal/Rectal: non-distended  Lymphatics: no edema  Skin: no rashes or petechiae  Neuro: A&O   Musculoskeletal: muscle mass normal  Additional Findings: Olmedo site NT, no drainage.        LABS AND IMAGING: I have assessed all abnormal lab values for their clinical significance and any values considered clinically significant have been addressed in the assessment and plan.        Lab Results   Component Value Date    WBC 9.1 10/17/2021    ANEU 39.7 (H) 10/08/2021    HGB 9.3 (L)  10/17/2021    HCT 28.0 (L) 10/17/2021     10/17/2021     10/17/2021    POTASSIUM 3.9 10/17/2021    CHLORIDE 108 10/17/2021    CO2 28 10/17/2021     (H) 10/17/2021    BUN 18 10/17/2021    CR 0.64 10/17/2021    MAG 2.1 10/16/2021    INR 0.99 10/14/2021           SYSTEMS-BASED ASSESSMENT AND PLAN     Mil Saels is a 60 yo Day +2 for auto PBSCT for MM      1. BMT/IEC PROTOCOL    MT 2016-35    Day-1 (10/14/2021) will get Melphalan and flush     Day 0 (10/15/2021) Transplant: will get half of her collected cells:  Total cell dose=10.8 X10^6 CD34/kg    G-CSF starts on day+5 (10/20/2021)     2. HEME/COAG    Risk of cytopenias due to chemotherapy and radiation    Transfusion parameters: hemoglobin <7, platelets <10    Relevant thrombosis or bleeding history: none     3. IMMUNOCOMPROMISED    Vaccination status: Influenza vaccination after day +60, COVID vaccination after day +100, followed by remaining vaccinations at 12, 14, 24, and 26 months.    Prophylaxis plan: fluconazole (not listed as allergy but she thinks this made her feel bad but willing to try it), ACV (had some previous issues with acyclovir but willing to retry), Levaquin     4. CARDIOVASCULAR    Risk of cardiomyopathy:  Baseline EF 3D LVEF volumetric analysis is 61.8%.    Risk of arrhythmia: Baseline EKG showed sinus rythym with sinus arrythmia    APc=191, consider recheck after starts levaquin/zofran        5. RESPIRATORY    Baseline PFTs: DLCO seble=97    Risk of respiratory complications: Frequent ambulation and incentive spirometer.    History of asthma     6. GI/NUTRITION    Ulcer prophylaxis: protonix    Risk of nausea/vomiting due to chemo/radiation: decadron and zofran scheduled    Does not react well to Compazine    Use ondansetron and lorazepam PRN    Responded well to scopolamine patch historically; worth trying this again for CINV    1L NS bolus today for hypovolemia    Ask pharmacy tomorrow if she can drink aloe  juice. Coconut water is probably ok.     7. RENAL/ELECTROLYTES/    Risk of renal injury: IV hydration with chemo, transplant    Electrolyte management: replace per sliding scale     8. DIABETES/ENDOCRINE    Risk of steroid-induced hyperglyemia: Monitor BG, sliding scale if needed    Bone health: calcium and vitamin D    US thyroid: 9/24/2021: Bilateral thyroid nodules. Among these, 1.1 cm left thyroid lobe meets the criteria for biopsy based on FDG avidity on recent PET/CT exam dated 9/21/2021. Per  close note: t Since most thyroid cancers that arise in the context of multinodular thyroid are slow growing and this nodule was diagnosed incidentally, will proceed with transplant and  biopsy this at count recovery, day 28 auto. Dr Joyner notified [place] by email to get biopsy scheduled then.     9. MUSCULOSKELETAL:  Has some left shoulder ache, not pain    Daily PT/OT as needed while inpatient    Cancer Rehab as needed outpatient     10. SYMPTOM MANAGEMENT    Nausea from chemo/radiation: Prochlorperazine, ondansetron, lorazepam.    Neuropathy:  She is on gabapentin     11.  Mood:  lamictal as mood stabilizer and prasozin for her PTSD    Known issues that I take into account for medical decisions, with salient changes to the plan considering these complexities noted above.    Patient Active Problem List   Diagnosis     Multiple myeloma, remission status unspecified (H)     Moderate asthma without complication     Stem cell donor       Today's summary: 1 L NS bolus, scopalomine patch for CINV, RTC daily for labs, transfusions, supportive care    I spent 30 minutes in the care of this patient today, which included time necessary for preparation for the visit, obtaining history, ordering medications/tests/procedures as medically indicated, review of pertinent medical literature, counseling of the patient, communication of recommendations to the care team, and documentation time.    Yohan Powers    Securely message with  the [employer] Web Console           Again, thank you for allowing me to participate in the care of your patient.        Sincerely,        BMT DOM

## 2021-10-17 NOTE — NURSING NOTE
Chief Complaint   Patient presents with     Blood Draw     Labs drawn via CVC by RN. VS taken.     Labs drawn from CVC by rn.  Good blood return noted in both lumens.  Both lumens flushed with NS and heparin.  Pt tolerated well.  VS taken.  Pt checked in for next appt.    Faisal Yang RN

## 2021-10-17 NOTE — PROGRESS NOTES
Infusion Nursing Note:  Mil Seals presents today for IVF.    Patient seen by provider today: Yes: Dr. Powers   present during visit today: Not Applicable.    Note: Labs monitored. Pt c/o nausea and lightheadedness. 1L NS bolus administered. Pt encouraged to drink plenty of fluids and take antinausea scheduled instead of PRN.     Intravenous Access:  No Intravenous access/labs at this visit.    Treatment Conditions:  Results reviewed, labs MET treatment parameters, ok to proceed with treatment.      Post Infusion Assessment:  Patient tolerated infusion without incident.       Discharge Plan:   [facility]  Patient will return tomorrow for next appointment.   Patient discharged in stable condition accompanied by: friend.  Departure Mode: Ambulatory.      Everette Tracey RN

## 2021-10-17 NOTE — PROGRESS NOTES
BMT/Cell Therapy Daily Progress Note   10/17/2021    Patient ID:  Mil Seals is a 61 year old female, currently day +2 of her therapy.     Diagnosis MM Multiple myeloma  BMTCT Type Autologous    Prep Regimen Melphalan  Donor Source Self    GVHD Prophylaxis No data was found  Primary BMT MD Sridhar Cullen   Clinical Trials   [id number]           INTERVAL  HISTORY     Feels weak, low energy, down overall.  A lot of nausea; had oral lorazepam and ondansetron 2 hours ago and could take in some fluids and a banana.  Interested in drinking coconut water and aloe vera juice to settle stomach.    Review of Systems: 10 point ROS negative except as noted above.      PHYSICAL EXAM     Weight In/Out     Wt Readings from Last 3 Encounters:   10/17/21 73.7 kg (162 lb 8 oz)   10/16/21 72.8 kg (160 lb 6.4 oz)   10/05/21 73.4 kg (161 lb 13.1 oz)             KPS:  70    /73   Pulse 91   Temp 98.6  F (37  C) (Oral)   Resp 16   Wt 73.7 kg (162 lb 8 oz)   SpO2 97%   BMI 26.12 kg/m       General: NAD   Eyes: : FRANK, sclera anicteric   Nose/Mouth/Throat: OP clear, buccal mucosa moist, no ulcerations   Lungs: Normal effort, no wheeze  Cardiovascular: No JVD   Abdominal/Rectal: non-distended  Lymphatics: no edema  Skin: no rashes or petechiae  Neuro: A&O   Musculoskeletal: muscle mass normal  Additional Findings: Olmedo site NT, no drainage.        LABS AND IMAGING: I have assessed all abnormal lab values for their clinical significance and any values considered clinically significant have been addressed in the assessment and plan.        Lab Results   Component Value Date    WBC 9.1 10/17/2021    ANEU 39.7 (H) 10/08/2021    HGB 9.3 (L) 10/17/2021    HCT 28.0 (L) 10/17/2021     10/17/2021     10/17/2021    POTASSIUM 3.9 10/17/2021    CHLORIDE 108 10/17/2021    CO2 28 10/17/2021     (H) 10/17/2021    BUN 18 10/17/2021    CR 0.64 10/17/2021    MAG 2.1 10/16/2021    INR 0.99 10/14/2021            SYSTEMS-BASED ASSESSMENT AND PLAN     Mil Seals is a 60 yo Day +2 for auto PBSCT for MM      1. BMT/IEC PROTOCOL    MT 2016-35    Day-1 (10/14/2021) will get Melphalan and flush     Day 0 (10/15/2021) Transplant: will get half of her collected cells:  Total cell dose=10.8 X10^6 CD34/kg    G-CSF starts on day+5 (10/20/2021)     2. HEME/COAG    Risk of cytopenias due to chemotherapy and radiation    Transfusion parameters: hemoglobin <7, platelets <10    Relevant thrombosis or bleeding history: none     3. IMMUNOCOMPROMISED    Vaccination status: Influenza vaccination after day +60, COVID vaccination after day +100, followed by remaining vaccinations at 12, 14, 24, and 26 months.    Prophylaxis plan: fluconazole (not listed as allergy but she thinks this made her feel bad but willing to try it), ACV (had some previous issues with acyclovir but willing to retry), Levaquin     4. CARDIOVASCULAR    Risk of cardiomyopathy:  Baseline EF 3D LVEF volumetric analysis is 61.8%.    Risk of arrhythmia: Baseline EKG showed sinus rythym with sinus arrythmia    YFg=072, consider recheck after starts levaquin/zofran        5. RESPIRATORY    Baseline PFTs: DLCO seble=97    Risk of respiratory complications: Frequent ambulation and incentive spirometer.    History of asthma     6. GI/NUTRITION    Ulcer prophylaxis: protonix    Risk of nausea/vomiting due to chemo/radiation: decadron and zofran scheduled    Does not react well to Compazine    Use ondansetron and lorazepam PRN    Responded well to scopolamine patch historically; worth trying this again for CINV    1L NS bolus today for hypovolemia    Ask pharmacy tomorrow if she can drink aloe juice. Coconut water is probably ok.     7. RENAL/ELECTROLYTES/    Risk of renal injury: IV hydration with chemo, transplant    Electrolyte management: replace per sliding scale     8. DIABETES/ENDOCRINE    Risk of steroid-induced hyperglyemia: Monitor BG, sliding scale if  needed    Bone health: calcium and vitamin D    US thyroid: 9/24/2021: Bilateral thyroid nodules. Among these, 1.1 cm left thyroid lobe meets the criteria for biopsy based on FDG avidity on recent PET/CT exam dated 9/21/2021. Per  close note: t Since most thyroid cancers that arise in the context of multinodular thyroid are slow growing and this nodule was diagnosed incidentally, will proceed with transplant and  biopsy this at count recovery, day 28 auto. Dr Joyner notified [place] by email to get biopsy scheduled then.     9. MUSCULOSKELETAL:  Has some left shoulder ache, not pain    Daily PT/OT as needed while inpatient    Cancer Rehab as needed outpatient     10. SYMPTOM MANAGEMENT    Nausea from chemo/radiation: Prochlorperazine, ondansetron, lorazepam.    Neuropathy:  She is on gabapentin     11.  Mood:  lamictal as mood stabilizer and prasozin for her PTSD    Known issues that I take into account for medical decisions, with salient changes to the plan considering these complexities noted above.    Patient Active Problem List   Diagnosis     Multiple myeloma, remission status unspecified (H)     Moderate asthma without complication     Stem cell donor       Today's summary: 1 L NS bolus, scopalomine patch for CINV, RTC daily for labs, transfusions, supportive care    I spent 30 minutes in the care of this patient today, which included time necessary for preparation for the visit, obtaining history, ordering medications/tests/procedures as medically indicated, review of pertinent medical literature, counseling of the patient, communication of recommendations to the care team, and documentation time.    Yohan Powers    Securely message with the [employer] Web Console

## 2021-10-17 NOTE — LETTER
10/17/2021         RE: Mil Seals  [address]  [facility] [address]        Dear Colleague,    Thank you for referring your patient, Mil Seals, to the [facility] BLOOD AND MARROW TRANSPLANT PROGRAM [place]. Please see a copy of my visit note below.    Infusion Nursing Note:  Mil Seals presents today for IVF.    Patient seen by provider today: Yes: Dr. Powers   present during visit today: Not Applicable.    Note: Labs monitored. Pt c/o nausea and lightheadedness. 1L NS bolus administered. Pt encouraged to drink plenty of fluids and take antinausea scheduled instead of PRN.     Intravenous Access:  No Intravenous access/labs at this visit.    Treatment Conditions:  Results reviewed, labs MET treatment parameters, ok to proceed with treatment.      Post Infusion Assessment:  Patient tolerated infusion without incident.       Discharge Plan:   AVS to patient via [facility]CH[facility].  Patient will return tomorrow for next appointment.   Patient discharged in stable condition accompanied by: friend.  Departure Mode: Ambulatory.      Everette Tracey RN                          Again, thank you for allowing me to participate in the care of your patient.        Sincerely,        [facility]

## 2021-10-18 ENCOUNTER — ONCOLOGY VISIT (OUTPATIENT)
Dept: TRANSPLANT | Facility: CLINIC | Age: 61
End: 2021-10-18
Attending: INTERNAL MEDICINE
Payer: MEDICAID

## 2021-10-18 ENCOUNTER — APPOINTMENT (OUTPATIENT)
Dept: LAB | Facility: CLINIC | Age: 61
End: 2021-10-18
Attending: INTERNAL MEDICINE
Payer: MEDICAID

## 2021-10-18 VITALS
SYSTOLIC BLOOD PRESSURE: 109 MMHG | HEART RATE: 97 BPM | BODY MASS INDEX: 26.26 KG/M2 | HEIGHT: 66 IN | DIASTOLIC BLOOD PRESSURE: 71 MMHG | OXYGEN SATURATION: 98 % | WEIGHT: 163.4 LBS | TEMPERATURE: 98.4 F | RESPIRATION RATE: 18 BRPM

## 2021-10-18 DIAGNOSIS — C90.00 MULTIPLE MYELOMA, REMISSION STATUS UNSPECIFIED (H): Primary | ICD-10-CM

## 2021-10-18 DIAGNOSIS — C90.00 MULTIPLE MYELOMA, REMISSION STATUS UNSPECIFIED (H): ICD-10-CM

## 2021-10-18 DIAGNOSIS — Z52.001 STEM CELL DONOR: ICD-10-CM

## 2021-10-18 DIAGNOSIS — Z52.001 STEM CELL DONOR: Primary | ICD-10-CM

## 2021-10-18 LAB
ALBUMIN SERPL-MCNC: 2.9 G/DL (ref 3.4–5)
ALP SERPL-CCNC: 68 U/L (ref 40–150)
ALT SERPL W P-5'-P-CCNC: 21 U/L (ref 0–50)
ANION GAP SERPL CALCULATED.3IONS-SCNC: 3 MMOL/L (ref 3–14)
AST SERPL W P-5'-P-CCNC: 13 U/L (ref 0–45)
BASOPHILS # BLD MANUAL: 0 10E3/UL (ref 0–0.2)
BASOPHILS NFR BLD MANUAL: 0 %
BILIRUB DIRECT SERPL-MCNC: 0.1 MG/DL (ref 0–0.2)
BILIRUB SERPL-MCNC: 0.6 MG/DL (ref 0.2–1.3)
BLD PROD TYP BPU: NORMAL
BLOOD COMPONENT TYPE: NORMAL
BUN SERPL-MCNC: 14 MG/DL (ref 7–30)
CALCIUM SERPL-MCNC: 8 MG/DL (ref 8.5–10.1)
CHLORIDE BLD-SCNC: 107 MMOL/L (ref 94–109)
CO2 SERPL-SCNC: 28 MMOL/L (ref 20–32)
CODING SYSTEM: NORMAL
CREAT SERPL-MCNC: 0.67 MG/DL (ref 0.52–1.04)
CROSSMATCH: NORMAL
EOSINOPHIL # BLD MANUAL: 0 10E3/UL (ref 0–0.7)
EOSINOPHIL NFR BLD MANUAL: 0 %
ERYTHROCYTE [DISTWIDTH] IN BLOOD BY AUTOMATED COUNT: 15 % (ref 10–15)
GFR SERPL CREATININE-BSD FRML MDRD: >90 ML/MIN/1.73M2
GLUCOSE BLD-MCNC: 110 MG/DL (ref 70–99)
HCT VFR BLD AUTO: 26.6 % (ref 35–47)
HGB BLD-MCNC: 8.8 G/DL (ref 11.7–15.7)
LYMPHOCYTES # BLD MANUAL: 0.3 10E3/UL (ref 0.8–5.3)
LYMPHOCYTES NFR BLD MANUAL: 5 %
MAGNESIUM SERPL-MCNC: 2.4 MG/DL (ref 1.6–2.3)
MCH RBC QN AUTO: 31.3 PG (ref 26.5–33)
MCHC RBC AUTO-ENTMCNC: 33.1 G/DL (ref 31.5–36.5)
MCV RBC AUTO: 95 FL (ref 78–100)
MONOCYTES # BLD MANUAL: 0 10E3/UL (ref 0–1.3)
MONOCYTES NFR BLD MANUAL: 0 %
NEUTROPHILS # BLD MANUAL: 5.2 10E3/UL (ref 1.6–8.3)
NEUTROPHILS NFR BLD MANUAL: 95 %
PLAT MORPH BLD: ABNORMAL
PLATELET # BLD AUTO: 214 10E3/UL (ref 150–450)
POTASSIUM BLD-SCNC: 4 MMOL/L (ref 3.4–5.3)
PROT SERPL-MCNC: 5.4 G/DL (ref 6.8–8.8)
RBC # BLD AUTO: 2.81 10E6/UL (ref 3.8–5.2)
RBC MORPH BLD: ABNORMAL
SODIUM SERPL-SCNC: 138 MMOL/L (ref 133–144)
UNIT ABO/RH: NORMAL
UNIT NUMBER: NORMAL
UNIT STATUS: NORMAL
UNIT TYPE ISBT: 600
WBC # BLD AUTO: 5.5 10E3/UL (ref 4–11)

## 2021-10-18 PROCEDURE — 250N000011 HC RX IP 250 OP 636: Performed by: PHYSICIAN ASSISTANT

## 2021-10-18 PROCEDURE — 36592 COLLECT BLOOD FROM PICC: CPT

## 2021-10-18 PROCEDURE — G0463 HOSPITAL OUTPT CLINIC VISIT: HCPCS | Mod: 25

## 2021-10-18 PROCEDURE — 83735 ASSAY OF MAGNESIUM: CPT

## 2021-10-18 PROCEDURE — 96360 HYDRATION IV INFUSION INIT: CPT

## 2021-10-18 PROCEDURE — 82248 BILIRUBIN DIRECT: CPT

## 2021-10-18 PROCEDURE — 258N000003 HC RX IP 258 OP 636: Performed by: PHYSICIAN ASSISTANT

## 2021-10-18 PROCEDURE — 80053 COMPREHEN METABOLIC PANEL: CPT

## 2021-10-18 PROCEDURE — 250N000013 HC RX MED GY IP 250 OP 250 PS 637: Performed by: PHYSICIAN ASSISTANT

## 2021-10-18 PROCEDURE — 85027 COMPLETE CBC AUTOMATED: CPT

## 2021-10-18 PROCEDURE — 99215 OFFICE O/P EST HI 40 MIN: CPT

## 2021-10-18 PROCEDURE — 96361 HYDRATE IV INFUSION ADD-ON: CPT

## 2021-10-18 RX ORDER — HEPARIN SODIUM (PORCINE) LOCK FLUSH IV SOLN 100 UNIT/ML 100 UNIT/ML
5 SOLUTION INTRAVENOUS
Status: CANCELLED | OUTPATIENT
Start: 2021-10-18

## 2021-10-18 RX ORDER — HEPARIN SODIUM (PORCINE) LOCK FLUSH IV SOLN 100 UNIT/ML 100 UNIT/ML
5 SOLUTION INTRAVENOUS
Status: CANCELLED | OUTPATIENT
Start: 2021-10-19

## 2021-10-18 RX ORDER — HEPARIN SODIUM,PORCINE 10 UNIT/ML
5 VIAL (ML) INTRAVENOUS
Status: CANCELLED | OUTPATIENT
Start: 2021-10-19

## 2021-10-18 RX ORDER — HEPARIN SODIUM,PORCINE 10 UNIT/ML
5 VIAL (ML) INTRAVENOUS
Status: DISCONTINUED | OUTPATIENT
Start: 2021-10-18 | End: 2021-10-18 | Stop reason: HOSPADM

## 2021-10-18 RX ORDER — LORAZEPAM 0.5 MG/1
0.5 TABLET ORAL ONCE
Status: COMPLETED | OUTPATIENT
Start: 2021-10-18 | End: 2021-10-18

## 2021-10-18 RX ORDER — HEPARIN SODIUM,PORCINE 10 UNIT/ML
5 VIAL (ML) INTRAVENOUS
Status: CANCELLED | OUTPATIENT
Start: 2021-10-18

## 2021-10-18 RX ADMIN — LORAZEPAM 0.5 MG: 0.5 TABLET ORAL at 12:15

## 2021-10-18 RX ADMIN — SODIUM CHLORIDE, PRESERVATIVE FREE 5 ML: 5 INJECTION INTRAVENOUS at 14:14

## 2021-10-18 RX ADMIN — SODIUM CHLORIDE 1000 ML: 9 INJECTION, SOLUTION INTRAVENOUS at 12:12

## 2021-10-18 RX ADMIN — SODIUM CHLORIDE, PRESERVATIVE FREE 3 ML: 5 INJECTION INTRAVENOUS at 11:41

## 2021-10-18 RX ADMIN — SODIUM CHLORIDE, PRESERVATIVE FREE 5 ML: 5 INJECTION INTRAVENOUS at 11:40

## 2021-10-18 ASSESSMENT — MIFFLIN-ST. JEOR: SCORE: 1320.18

## 2021-10-18 NOTE — NURSING NOTE
Chief Complaint   Patient presents with     Labs Only     cvc, heparin locked, vitals checked     Stephanie Ferraro RN on 10/18/2021 at 11:44 AM

## 2021-10-18 NOTE — LETTER
10/18/2021         RE: Mil Seals  [address]  [facility] [address]        Dear Colleague,    Thank you for referring your patient, Mil Seals, to the [facility] BLOOD AND MARROW TRANSPLANT PROGRAM [place]. Please see a copy of my visit note below.    BMT Clinic Note   10/18/2021    Patient ID:  Mil Seals is a 61 year old woman D+3 s/p auto PBSCT for MM.     Diagnosis MM Multiple myeloma  BMTCT Type Autologous    Prep Regimen Melphalan   Primary BMT MD Dr. rSidhar Cullen   Clinical Trials   [id number]         INTERVAL  HISTORY     Feels weak, low energy, little dizzy. Had some abd cramping (osmin left sided) last night. Feels bloated; last bm 3+ days ago. No fevers. Nausea - taking Zofran tid and Ativan in between. Eating ok but struggling with fluids.     Review of Systems: 10 point ROS negative except as noted above.      PHYSICAL EXAM     Weight In/Out     Wt Readings from Last 3 Encounters:   10/18/21 74.1 kg (163 lb 6.4 oz)   10/17/21 73.7 kg (162 lb 8 oz)   10/16/21 72.8 kg (160 lb 6.4 oz)             KPS:  70    BP 97/62 (Patient Position: Sitting)   Pulse 95   Temp 98.4  F (36.9  C)   Resp 18   Wt 74.1 kg (163 lb 6.4 oz)   SpO2 98%   BMI 26.26 kg/m       General: NAD , tired appearing, sitting in wheelchair  Eyes: sclera anicteric   Nose/Mouth/Throat: OP moist, no ulcerations   Lungs: CTAB  Cardiovascular: RRR no mrg  Abdominal/Rectal: hyperactive bs, soft, slt distended, NT  Lymphatics: no edema  Skin: no rashes or petechiae  Neuro: A&O   Access: CVC NT, no drainage.      LABS AND IMAGING: I have assessed all abnormal lab values for their clinical significance and any values considered clinically significant have been addressed in the assessment and plan.      Lab Results   Component Value Date    WBC 5.5 10/18/2021    ANEU 5.2 10/18/2021    HGB 8.8 (L) 10/18/2021    HCT 26.6 (L) 10/18/2021     10/18/2021     10/18/2021    POTASSIUM 4.0 10/18/2021     CHLORIDE 107 10/18/2021    CO2 28 10/18/2021     (H) 10/18/2021    BUN 14 10/18/2021    CR 0.67 10/18/2021    MAG 2.4 (H) 10/18/2021    INR 0.99 10/14/2021    BILITOTAL 0.6 10/18/2021    AST 13 10/18/2021    ALT 21 10/18/2021    ALKPHOS 68 10/18/2021    PROTTOTAL 5.4 (L) 10/18/2021    ALBUMIN 2.9 (L) 10/18/2021           SYSTEMS-BASED ASSESSMENT AND PLAN     Mil Seals is a 60 yo Day +3 s/p auto PBSCT for MM      1. BMT/IEC PROTOCOL    MT 2016-35    Day-1 (10/14/2021) will get Melphalan and flush     Day 0 (10/15/2021) Transplant: received half of her collected cells:  Total cell dose=10.8 X10^6 CD34/kg    G-CSF starts on day+5 (10/20/2021)     2. HEME/COAG    Risk of cytopenias due to chemotherapy and radiation    Transfusion parameters: hemoglobin <7, platelets <10    Anemia 2/2 chemo    Relevant thrombosis or bleeding history: none     3. ID: afebrile.    Prophylaxis plan: fluconazole, ACV, Levaquin     4. CARDIOVASCULAR    Risk of cardiomyopathy:  Baseline EF 3D LVEF volumetric analysis is 61.8%.    Risk of arrhythmia: Baseline EKG showed sinus rythym with sinus arrythmia    SAa=853, consider recheck after starts levaquin/zofran      5. RESPIRATORY    Baseline PFTs: DLCO seble=97    Risk of respiratory complications: Frequent ambulation and incentive spirometer.    History of asthma     6. GI/NUTRITION    Ulcer prophylaxis: protonix    Nausea 2/2 chemo/BMT: Zofran tid, Ativan prn. Will  scopolamine patch today. Ativan 0.5mg PO x1 in clinic today.    Constipation: Colace, prune juice prn. Try to cut back on Zofran if scop patch works well.      7. RENAL/ELECTROLYTES/    Decreased intake, some dizziness. 1L NS today and plan for daily IVF for now.     Electrolyte management: replace per sliding scale     8. DIABETES/ENDOCRINE    Risk of steroid-induced hyperglyemia: Monitor BG, sliding scale if needed    Bone health: calcium and vitamin D    US thyroid: 9/24/2021: Bilateral thyroid  nodules. Among these, 1.1 cm left thyroid lobe meets the criteria for biopsy based on FDG avidity on recent PET/CT exam dated 9/21/2021. Per  close note: t Since most thyroid cancers that arise in the context of multinodular thyroid are slow growing and this nodule was diagnosed incidentally, will proceed with transplant and  biopsy this at count recovery, day 28 auto. Dr Joyner notified [place] by email to get biopsy scheduled then.     9.  Neuropathy:  cont gabapentin     10.  Mood:  lamictal as mood stabilizer and prasozin for her PTSD    Today's summary: 1 L NS bolus, scopalomine patch for CINV, Ativan po x1 in clinic.  RTC daily for labs, follow-up, IVF (appts thru Sun 10/24 currently)    I spent 40 minutes in the care of this patient today, which included time necessary for preparation for the visit, obtaining history, ordering medications/tests/procedures as medically indicated, review of pertinent medical literature, counseling of the patient, communication of recommendations to the care team, and documentation time.    Jazmin Aviles PA-C  [phone]              Again, thank you for allowing me to participate in the care of your patient.        Sincerely,        BMT Advanced Practice Provider     Handoff report given to Randa NETTLES. Understands pmh, medications given and plan of care for patient. Patient in stable condition, vital signs updated, has no complaints at this time and has been updated on care plan. Explained to patient that it is change of shift and new nurse is taking over, pt verbalized understanding.

## 2021-10-18 NOTE — NURSING NOTE
"Oncology Rooming Note    October 18, 2021 11:47 AM   Chadjannette VILLANUEVA Josafat eSals is a 61 year old female who presents for:    Chief Complaint   Patient presents with     Labs Only     cvc, heparin locked, vitals checked     Oncology Clinic Visit     [facility] RETURN - MULTIPLE MYELOMA     Initial Vitals: /71 (Patient Position: Standing)   Pulse 97   Temp 98.4  F (36.9  C)   Resp 18   Ht 1.68 m (5' 6.14\")   Wt 74.1 kg (163 lb 6.4 oz)   SpO2 98%   BMI 26.26 kg/m   Estimated body mass index is 26.26 kg/m  as calculated from the following:    Height as of this encounter: 1.68 m (5' 6.14\").    Weight as of this encounter: 74.1 kg (163 lb 6.4 oz). Body surface area is 1.86 meters squared.  Data Unavailable Comment: Data Unavailable   No LMP recorded. Patient is postmenopausal.  Allergies reviewed: Yes  Medications reviewed: Yes    Medications: Medication refills not needed today.  Pharmacy name entered into [facility]: [id number] - [address]    Clinical concerns: No new concerns. Jazmin was notified.      Korey Maki LPN            "

## 2021-10-18 NOTE — LETTER
10/18/2021         RE: Mil Seals  [address]  [facility] [address]        Dear Colleague,    Thank you for referring your patient, Mil Seals, to the [facility] BLOOD AND MARROW TRANSPLANT PROGRAM [place]. Please see a copy of my visit note below.    Infusion Nursing Note:  Mil Seals presents today for add-on infusion.    Patient seen by provider today: Yes: Jzamin   present during visit today: Not Applicable.    Note: Patient received 1 L NS bolus per provider's request and 0.5 mg PO ativan for nausea.      Intravenous Access:  Olmedo.    Treatment Conditions:  Results reviewed, labs MET treatment parameters, ok to proceed with treatment.      Post Infusion Assessment:  Patient tolerated infusion without incident.       Discharge Plan:   Patient and/or family verbalized understanding of discharge instructions and all questions answered.      Thu Duong, NICKIE                          Again, thank you for allowing me to participate in the care of your patient.        Sincerely,        [facility]

## 2021-10-18 NOTE — PROGRESS NOTES
Infusion Nursing Note:  Mil Seals presents today for add-on infusion.    Patient seen by provider today: Yes: Jazmin   present during visit today: Not Applicable.    Note: Patient received 1 L NS bolus per provider's request and 0.5 mg PO ativan for nausea.      Intravenous Access:  Olmedo.    Treatment Conditions:  Results reviewed, labs MET treatment parameters, ok to proceed with treatment.      Post Infusion Assessment:  Patient tolerated infusion without incident.       Discharge Plan:   Patient and/or family verbalized understanding of discharge instructions and all questions answered.      Thu Duong RN

## 2021-10-18 NOTE — PROGRESS NOTES
BMT Clinic Note   10/18/2021    Patient ID:  Mil Seals is a 61 year old woman D+3 s/p auto PBSCT for MM.     Diagnosis MM Multiple myeloma  BMTCT Type Autologous    Prep Regimen Melphalan   Primary BMT MD Dr. Sridhar Cullen   Clinical Trials   [id number]         INTERVAL  HISTORY     Feels weak, low energy, little dizzy. Had some abd cramping (osmin left sided) last night. Feels bloated; last bm 3+ days ago. No fevers. Nausea - taking Zofran tid and Ativan in between. Eating ok but struggling with fluids.     Review of Systems: 10 point ROS negative except as noted above.      PHYSICAL EXAM     Weight In/Out     Wt Readings from Last 3 Encounters:   10/18/21 74.1 kg (163 lb 6.4 oz)   10/17/21 73.7 kg (162 lb 8 oz)   10/16/21 72.8 kg (160 lb 6.4 oz)             KPS:  70    BP 97/62 (Patient Position: Sitting)   Pulse 95   Temp 98.4  F (36.9  C)   Resp 18   Wt 74.1 kg (163 lb 6.4 oz)   SpO2 98%   BMI 26.26 kg/m       General: NAD , tired appearing, sitting in wheelchair  Eyes: sclera anicteric   Nose/Mouth/Throat: OP moist, no ulcerations   Lungs: CTAB  Cardiovascular: RRR no mrg  Abdominal/Rectal: hyperactive bs, soft, slt distended, NT  Lymphatics: no edema  Skin: no rashes or petechiae  Neuro: A&O   Access: CVC NT, no drainage.      LABS AND IMAGING: I have assessed all abnormal lab values for their clinical significance and any values considered clinically significant have been addressed in the assessment and plan.      Lab Results   Component Value Date    WBC 5.5 10/18/2021    ANEU 5.2 10/18/2021    HGB 8.8 (L) 10/18/2021    HCT 26.6 (L) 10/18/2021     10/18/2021     10/18/2021    POTASSIUM 4.0 10/18/2021    CHLORIDE 107 10/18/2021    CO2 28 10/18/2021     (H) 10/18/2021    BUN 14 10/18/2021    CR 0.67 10/18/2021    MAG 2.4 (H) 10/18/2021    INR 0.99 10/14/2021    BILITOTAL 0.6 10/18/2021    AST 13 10/18/2021    ALT 21 10/18/2021    ALKPHOS 68 10/18/2021    PROTTOTAL 5.4 (L)  10/18/2021    ALBUMIN 2.9 (L) 10/18/2021           SYSTEMS-BASED ASSESSMENT AND PLAN     Mil Seals is a 60 yo Day +3 s/p auto PBSCT for MM      1. BMT/IEC PROTOCOL    MT 2016-35    Day-1 (10/14/2021) will get Melphalan and flush     Day 0 (10/15/2021) Transplant: received half of her collected cells:  Total cell dose=10.8 X10^6 CD34/kg    G-CSF starts on day+5 (10/20/2021)     2. HEME/COAG    Risk of cytopenias due to chemotherapy and radiation    Transfusion parameters: hemoglobin <7, platelets <10    Anemia 2/2 chemo    Relevant thrombosis or bleeding history: none     3. ID: afebrile.    Prophylaxis plan: fluconazole, ACV, Levaquin     4. CARDIOVASCULAR    Risk of cardiomyopathy:  Baseline EF 3D LVEF volumetric analysis is 61.8%.    Risk of arrhythmia: Baseline EKG showed sinus rythym with sinus arrythmia    WHz=101, consider recheck after starts levaquin/zofran      5. RESPIRATORY    Baseline PFTs: DLCO seble=97    Risk of respiratory complications: Frequent ambulation and incentive spirometer.    History of asthma     6. GI/NUTRITION    Ulcer prophylaxis: protonix    Nausea 2/2 chemo/BMT: Zofran tid, Ativan prn. Will  scopolamine patch today. Ativan 0.5mg PO x1 in clinic today.    Constipation: Colace, prune juice prn. Try to cut back on Zofran if scop patch works well.      7. RENAL/ELECTROLYTES/    Decreased intake, some dizziness. 1L NS today and plan for daily IVF for now.     Electrolyte management: replace per sliding scale     8. DIABETES/ENDOCRINE    Risk of steroid-induced hyperglyemia: Monitor BG, sliding scale if needed    Bone health: calcium and vitamin D    US thyroid: 9/24/2021: Bilateral thyroid nodules. Among these, 1.1 cm left thyroid lobe meets the criteria for biopsy based on FDG avidity on recent PET/CT exam dated 9/21/2021. Per  close note: t Since most thyroid cancers that arise in the context of multinodular thyroid are slow growing and this nodule was diagnosed  incidentally, will proceed with transplant and  biopsy this at count recovery, day 28 auto. Dr Joyner notified [place] by email to get biopsy scheduled then.     9.  Neuropathy:  cont gabapentin     10.  Mood:  lamictal as mood stabilizer and prasozin for her PTSD    Today's summary: 1 L NS bolus, scopalomine patch for CINV, Ativan po x1 in clinic.  RTC daily for labs, follow-up, IVF (appts thru Sun 10/24 currently)    I spent 40 minutes in the care of this patient today, which included time necessary for preparation for the visit, obtaining history, ordering medications/tests/procedures as medically indicated, review of pertinent medical literature, counseling of the patient, communication of recommendations to the care team, and documentation time.    HILARIA Cardenas-C  [phone]

## 2021-10-18 NOTE — PROGRESS NOTES
I reviewed the discharge medication, med box and administration times with Mil. All the medications were correct per protocol. I discouraged her in using supplements until she  Is through the acute phase of transplant and the stem cell are durable and her platelet counts are stable.     Current Outpatient Medications   Medication Sig Dispense Refill     acetaminophen (TYLENOL) 500 MG tablet Take 500-1,000 mg by mouth every 8 hours as needed for mild pain       acyclovir (ZOVIRAX) 800 MG tablet Take 1 tablet (800 mg) by mouth 2 times daily for 14 days 28 tablet 0     albuterol (ACCUNEB) 1.25 MG/3ML neb solution Take 1.25 mg by nebulization every 6 hours as needed for shortness of breath / dyspnea or wheezing       albuterol (PROAIR HFA/PROVENTIL HFA/VENTOLIN HFA) 108 (90 Base) MCG/ACT inhaler Inhale 2 puffs into the lungs every 4 hours as needed for shortness of breath / dyspnea or wheezing       budesonide-formoterol (SYMBICORT) 160-4.5 MCG/ACT Inhaler Inhale 2 puffs into the lungs 2 times daily As needed       calcium carbonate (OS-BRUNA) 1500 (600 Ca) MG tablet Take 1,530 mg by mouth 2 times daily (with meals)        cholecalciferol (VITAMIN D3) 125 mcg (5000 units) capsule Take 2 capsules by mouth daily        fluconazole (DIFLUCAN) 200 MG tablet Take 1 tablet (200 mg) by mouth daily 14 tablet 0     gabapentin (NEURONTIN) 100 MG capsule Take 100 mg by mouth 2 times daily Taking prn        heparin lock flush 10 UNIT/ML SOLN injection 5 mLs by Intracatheter route daily Please flush 5 ml into each lumen daily. 140 mL 0     LamoTRIgine (LAMICTAL XR) 300 MG 24 hr tablet Take 300 mg by mouth At Bedtime       levofloxacin (LEVAQUIN) 250 MG tablet Take 1 tablet (250 mg) by mouth daily 14 tablet 0     loratadine (CLARITIN) 10 MG tablet Take 10 mg by mouth At Bedtime        LORazepam (ATIVAN) 0.5 MG tablet Take 0.5 mg by mouth nightly as needed for anxiety       ondansetron (ZOFRAN-ODT) 8 MG ODT tab Take 1 tablet (8 mg)  by mouth every 8 hours as needed for nausea 40 tablet 0     pantoprazole (PROTONIX) 40 MG EC tablet Take 1 tablet (40 mg) by mouth daily 30 tablet 0     prazosin (MINIPRESS) 1 MG capsule Take 1 mg by mouth 3 times daily       scopolamine (TRANSDERM) 1 MG/3DAYS 72 hr patch Place 1 patch onto the skin every 72 hours 4 patch 1        Time spent in this activity: 15 minutes        Jerman Rodriguez VASHTI, PharmD

## 2021-10-19 ENCOUNTER — APPOINTMENT (OUTPATIENT)
Dept: LAB | Facility: CLINIC | Age: 61
End: 2021-10-19
Attending: INTERNAL MEDICINE
Payer: MEDICAID

## 2021-10-19 ENCOUNTER — INFUSION THERAPY VISIT (OUTPATIENT)
Dept: TRANSPLANT | Facility: CLINIC | Age: 61
End: 2021-10-19
Attending: INTERNAL MEDICINE
Payer: MEDICAID

## 2021-10-19 VITALS
RESPIRATION RATE: 16 BRPM | OXYGEN SATURATION: 98 % | TEMPERATURE: 98.3 F | HEART RATE: 92 BPM | DIASTOLIC BLOOD PRESSURE: 69 MMHG | SYSTOLIC BLOOD PRESSURE: 101 MMHG

## 2021-10-19 DIAGNOSIS — C90.00 MULTIPLE MYELOMA, REMISSION STATUS UNSPECIFIED (H): Primary | ICD-10-CM

## 2021-10-19 LAB
ABO/RH(D): ABNORMAL
ABO/RH(D): NORMAL
ALBUMIN SERPL-MCNC: 2.7 G/DL (ref 3.4–5)
ALP SERPL-CCNC: 64 U/L (ref 40–150)
ALT SERPL W P-5'-P-CCNC: 18 U/L (ref 0–50)
ANION GAP SERPL CALCULATED.3IONS-SCNC: 2 MMOL/L (ref 3–14)
ANTIBODY SCREEN, TUBE: NORMAL
ANTIBODY SCREEN: POSITIVE
AST SERPL W P-5'-P-CCNC: 12 U/L (ref 0–45)
BASOPHILS # BLD MANUAL: 0 10E3/UL (ref 0–0.2)
BASOPHILS NFR BLD MANUAL: 0 %
BILIRUB DIRECT SERPL-MCNC: 0.1 MG/DL (ref 0–0.2)
BILIRUB SERPL-MCNC: 0.3 MG/DL (ref 0.2–1.3)
BUN SERPL-MCNC: 9 MG/DL (ref 7–30)
CALCIUM SERPL-MCNC: 7.9 MG/DL (ref 8.5–10.1)
CHLORIDE BLD-SCNC: 107 MMOL/L (ref 94–109)
CO2 SERPL-SCNC: 30 MMOL/L (ref 20–32)
CREAT SERPL-MCNC: 0.56 MG/DL (ref 0.52–1.04)
EOSINOPHIL # BLD MANUAL: 0 10E3/UL (ref 0–0.7)
EOSINOPHIL NFR BLD MANUAL: 1 %
ERYTHROCYTE [DISTWIDTH] IN BLOOD BY AUTOMATED COUNT: 14.7 % (ref 10–15)
GFR SERPL CREATININE-BSD FRML MDRD: >90 ML/MIN/1.73M2
GLUCOSE BLD-MCNC: 103 MG/DL (ref 70–99)
HCT VFR BLD AUTO: 25.4 % (ref 35–47)
HGB BLD-MCNC: 8.3 G/DL (ref 11.7–15.7)
LYMPHOCYTES # BLD MANUAL: 0.1 10E3/UL (ref 0.8–5.3)
LYMPHOCYTES NFR BLD MANUAL: 4 %
MAGNESIUM SERPL-MCNC: 2.4 MG/DL (ref 1.6–2.3)
MCH RBC QN AUTO: 30.5 PG (ref 26.5–33)
MCHC RBC AUTO-ENTMCNC: 32.7 G/DL (ref 31.5–36.5)
MCV RBC AUTO: 93 FL (ref 78–100)
MONOCYTES # BLD MANUAL: 0 10E3/UL (ref 0–1.3)
MONOCYTES NFR BLD MANUAL: 0 %
NEUTROPHILS # BLD MANUAL: 1.4 10E3/UL (ref 1.6–8.3)
NEUTROPHILS NFR BLD MANUAL: 95 %
PLAT MORPH BLD: ABNORMAL
PLATELET # BLD AUTO: 193 10E3/UL (ref 150–450)
POTASSIUM BLD-SCNC: 4.1 MMOL/L (ref 3.4–5.3)
PROT SERPL-MCNC: 5.2 G/DL (ref 6.8–8.8)
RBC # BLD AUTO: 2.72 10E6/UL (ref 3.8–5.2)
RBC MORPH BLD: ABNORMAL
SODIUM SERPL-SCNC: 139 MMOL/L (ref 133–144)
SPECIMEN EXPIRATION DATE: ABNORMAL
SPECIMEN EXPIRATION DATE: NORMAL
SPECIMEN EXPIRATION DATE: NORMAL
WBC # BLD AUTO: 1.5 10E3/UL (ref 4–11)

## 2021-10-19 PROCEDURE — 250N000011 HC RX IP 250 OP 636: Performed by: INTERNAL MEDICINE

## 2021-10-19 PROCEDURE — 86900 BLOOD TYPING SEROLOGIC ABO: CPT

## 2021-10-19 PROCEDURE — 83735 ASSAY OF MAGNESIUM: CPT

## 2021-10-19 PROCEDURE — 86850 RBC ANTIBODY SCREEN: CPT

## 2021-10-19 PROCEDURE — 36592 COLLECT BLOOD FROM PICC: CPT

## 2021-10-19 PROCEDURE — 258N000003 HC RX IP 258 OP 636: Performed by: INTERNAL MEDICINE

## 2021-10-19 PROCEDURE — 82248 BILIRUBIN DIRECT: CPT

## 2021-10-19 PROCEDURE — 80053 COMPREHEN METABOLIC PANEL: CPT

## 2021-10-19 PROCEDURE — 99213 OFFICE O/P EST LOW 20 MIN: CPT | Performed by: PHYSICIAN ASSISTANT

## 2021-10-19 PROCEDURE — 85027 COMPLETE CBC AUTOMATED: CPT

## 2021-10-19 PROCEDURE — 86922 COMPATIBILITY TEST ANTIGLOB: CPT

## 2021-10-19 RX ORDER — OLANZAPINE 2.5 MG/1
2.5 TABLET, FILM COATED ORAL 2 TIMES DAILY
Qty: 60 TABLET | Refills: 0 | Status: SHIPPED | OUTPATIENT
Start: 2021-10-19 | End: 2021-11-12

## 2021-10-19 RX ORDER — HEPARIN SODIUM,PORCINE 10 UNIT/ML
5 VIAL (ML) INTRAVENOUS
Status: CANCELLED | OUTPATIENT
Start: 2021-10-19

## 2021-10-19 RX ORDER — HEPARIN SODIUM,PORCINE 10 UNIT/ML
3 VIAL (ML) INTRAVENOUS ONCE
Status: COMPLETED | OUTPATIENT
Start: 2021-10-19 | End: 2021-10-19

## 2021-10-19 RX ORDER — HEPARIN SODIUM (PORCINE) LOCK FLUSH IV SOLN 100 UNIT/ML 100 UNIT/ML
5 SOLUTION INTRAVENOUS
Status: CANCELLED | OUTPATIENT
Start: 2021-10-19

## 2021-10-19 RX ADMIN — SODIUM CHLORIDE 1000 ML: 9 INJECTION, SOLUTION INTRAVENOUS at 12:51

## 2021-10-19 RX ADMIN — Medication 3 ML: at 12:32

## 2021-10-19 RX ADMIN — Medication 3 ML: at 12:33

## 2021-10-19 ASSESSMENT — PAIN SCALES - GENERAL: PAINLEVEL: NO PAIN (0)

## 2021-10-19 NOTE — LETTER
10/19/2021         RE: Mil Seals  [address]  [facility] [address]        Dear Colleague,    Thank you for referring your patient, Mil Seals, to the [facility] BLOOD AND MARROW TRANSPLANT PROGRAM [place]. Please see a copy of my visit note below.    BMT Clinic Note   10/19/2021    Patient ID:  Mil Seals is a 61 year old woman D+4 s/p auto PBSCT for MM.     Diagnosis MM Multiple myeloma  BMTCT Type Autologous    Prep Regimen Melphalan   Primary BMT MD Dr. Sridhar Cullen   Clinical Trials   [id number]         INTERVAL  HISTORY     Fatigued, weak, nauseated at all times.  Cant really eat or drink much at all.  No vomiting.  No diarrhea.  Was constipated, but bowels now moving with colace.  No fever/cough. No swelling. No bleeding. Notes that her gums burn whenever she feels a hotflash; this has happened since starting GCSF with collections. Stumbling and off balance, which she notes happens when she takes ativan.  She used to limit it to nighttime due to this, but now she is needing it for daytime nausea.     Review of Systems: 8 point ROS negative except as noted above.      PHYSICAL EXAM     Weight In/Out     Wt Readings from Last 3 Encounters:   10/18/21 74.1 kg (163 lb 6.4 oz)   10/17/21 73.7 kg (162 lb 8 oz)   10/16/21 72.8 kg (160 lb 6.4 oz)             KPS:  70    /69 (BP Location: Right arm, Patient Position: Sitting, Cuff Size: Adult Regular)   Pulse 92   Temp 98.3  F (36.8  C) (Oral)   Resp 16   SpO2 98%      General: NAD , tired  Eyes: sclera anicteric   Nose/Mouth/Throat: OP moist, no ulcerations   Lungs: CTAB  Cardiovascular: RRR no mrg  Abdominal/Rectal: normoactive bs, soft, slt distended, NT  Lymphatics: no edema  Skin: no rashes or petechiae  Neuro: A&O   Access: CVC NT, no drainage.      LABS AND IMAGING: I have assessed all abnormal lab values for their clinical significance and any values considered clinically significant have been addressed in the  assessment and plan.      Lab Results   Component Value Date    WBC 1.5 (L) 10/19/2021    ANEU 1.4 (L) 10/19/2021    HGB 8.3 (L) 10/19/2021    HCT 25.4 (L) 10/19/2021     10/19/2021     10/19/2021    POTASSIUM 4.1 10/19/2021    CHLORIDE 107 10/19/2021    CO2 30 10/19/2021     (H) 10/19/2021    BUN 9 10/19/2021    CR 0.56 10/19/2021    MAG 2.4 (H) 10/19/2021    INR 0.99 10/14/2021    BILITOTAL 0.3 10/19/2021    AST 12 10/19/2021    ALT 18 10/19/2021    ALKPHOS 64 10/19/2021    PROTTOTAL 5.2 (L) 10/19/2021    ALBUMIN 2.7 (L) 10/19/2021           SYSTEMS-BASED ASSESSMENT AND PLAN     Mil Seals is a 62 yo Day +4 s/p auto PBSCT for MM      1. BMT/IEC PROTOCOL    MT 2016-35    Day-1 (10/14/2021) will get Melphalan and flush     Day 0 (10/15/2021) Transplant: received half of her collected cells:  Total cell dose=10.8 X10^6 CD34/kg    G-CSF starts on day+5 (10/20/2021)     2. HEME/COAG    Risk of cytopenias due to chemotherapy and radiation    Transfusion parameters: hemoglobin <7, platelets <10    Anemia 2/2 chemo    Relevant thrombosis or bleeding history: none     3. ID: afebrile.    Prophylaxis plan: fluconazole, ACV, Levaquin     4. CARDIOVASCULAR    Risk of cardiomyopathy:  Baseline EF 3D LVEF volumetric analysis is 61.8%.    Risk of arrhythmia: Baseline EKG showed sinus rythym with sinus arrythmia    VTo=031, consider recheck after starts levaquin/zofran      5. RESPIRATORY    Baseline PFTs: DLCO seble=97    Risk of respiratory complications: Frequent ambulation and incentive spirometer.    History of asthma     6. GI/NUTRITION    Ulcer prophylaxis: protonix    Nausea 2/2 chemo/BMT: Zofran tid, trying scopolamine patch since 10/18; it isn't helping much. Add zyprexa 2.5mg bid, scheduled.  Should check QTc on Thurs/Friday of this week.     Constipation: Colace, prune juice prn.       7. RENAL/ELECTROLYTES/    1L NS daily prn; repeat 10/19    Electrolyte management: replace per  sliding scale     8. DIABETES/ENDOCRINE    Risk of steroid-induced hyperglyemia: Monitor BG, sliding scale if needed    Bone health: calcium and vitamin D    US thyroid: 9/24/2021: Bilateral thyroid nodules. Among these, 1.1 cm left thyroid lobe meets the criteria for biopsy based on FDG avidity on recent PET/CT exam dated 9/21/2021. Per  close note: t Since most thyroid cancers that arise in the context of multinodular thyroid are slow growing and this nodule was diagnosed incidentally, will proceed with transplant and  biopsy this at count recovery, day 28 auto. Dr Joyner notified [place] by email to get biopsy scheduled then.     9.  Neuropathy:  cont gabapentin     10.  Mood:  lamictal as mood stabilizer and prasozin for her PTSD    Today's summary: 1 L NS bolus, zyprexa bid; plan for EKG on 10/21-22 to check QTc (on levaquin, zofran, zyprexa).  RTC daily for labs, follow-up, IVF (appts thru Sun 10/24 currently)    I spent 20 minutes in the care of this patient today, which included time necessary for preparation for the visit, obtaining history, ordering medications/tests/procedures as medically indicated, review of pertinent medical literature, counseling of the patient, communication of recommendations to the care team, and documentation time.    Laly Ye PA-C  10/19/2021                Again, thank you for allowing me to participate in the care of your patient.        Sincerely,        BMT Advanced Practice Provider

## 2021-10-19 NOTE — NURSING NOTE
"Oncology Rooming Note    October 19, 2021 12:53 PM   Mil Seals is a 61 year old female who presents for:    Chief Complaint   Patient presents with     Blood Draw     Labs drawn via cvc by rn in lab. VS taken.     RECHECK     Provider visit r/t MM     Initial Vitals: /69 (BP Location: Right arm, Patient Position: Sitting, Cuff Size: Adult Regular)   Pulse 92   Temp 98.3  F (36.8  C) (Oral)   Resp 16   SpO2 98%  Estimated body mass index is 26.26 kg/m  as calculated from the following:    Height as of 10/18/21: 1.68 m (5' 6.14\").    Weight as of 10/18/21: 74.1 kg (163 lb 6.4 oz). There is no height or weight on file to calculate BSA.  No Pain (0) Comment: Data Unavailable   No LMP recorded. Patient is postmenopausal.  Allergies reviewed: Yes  Medications reviewed: Yes    Medications: Medication refills not needed today.  Pharmacy name entered into [facility]: [id number] [address]    Clinical concerns: VSS. Pt has bad headache, patient is very fatigued, nauseous, having indigestion, night sweats at home.     Patient also has mouth sores starting. Please advise.           Maegan Echeverria RN              "

## 2021-10-19 NOTE — NURSING NOTE
Chief Complaint   Patient presents with     Blood Draw     Labs drawn via cvc by rn in lab. VS taken.     Labs collected from CVC by RN, line flushed with saline and heparin.  Vitals taken. Pt checked in for appointment(s).    Jerman Hyman RN

## 2021-10-19 NOTE — PROGRESS NOTES
BMT Clinic Note   10/19/2021    Patient ID:  Mil Seals is a 61 year old woman D+4 s/p auto PBSCT for MM.     Diagnosis MM Multiple myeloma  BMTCT Type Autologous    Prep Regimen Melphalan   Primary BMT MD Dr. Sridhar Cullen   Clinical Trials   [id number]         INTERVAL  HISTORY     Fatigued, weak, nauseated at all times.  Cant really eat or drink much at all.  No vomiting.  No diarrhea.  Was constipated, but bowels now moving with colace.  No fever/cough. No swelling. No bleeding. Notes that her gums burn whenever she feels a hotflash; this has happened since starting GCSF with collections. Stumbling and off balance, which she notes happens when she takes ativan.  She used to limit it to nighttime due to this, but now she is needing it for daytime nausea.     Review of Systems: 8 point ROS negative except as noted above.      PHYSICAL EXAM     Weight In/Out     Wt Readings from Last 3 Encounters:   10/18/21 74.1 kg (163 lb 6.4 oz)   10/17/21 73.7 kg (162 lb 8 oz)   10/16/21 72.8 kg (160 lb 6.4 oz)             KPS:  70    /69 (BP Location: Right arm, Patient Position: Sitting, Cuff Size: Adult Regular)   Pulse 92   Temp 98.3  F (36.8  C) (Oral)   Resp 16   SpO2 98%      General: NAD , tired  Eyes: sclera anicteric   Nose/Mouth/Throat: OP moist, no ulcerations   Lungs: CTAB  Cardiovascular: RRR no mrg  Abdominal/Rectal: normoactive bs, soft, slt distended, NT  Lymphatics: no edema  Skin: no rashes or petechiae  Neuro: A&O   Access: CVC NT, no drainage.      LABS AND IMAGING: I have assessed all abnormal lab values for their clinical significance and any values considered clinically significant have been addressed in the assessment and plan.      Lab Results   Component Value Date    WBC 1.5 (L) 10/19/2021    ANEU 1.4 (L) 10/19/2021    HGB 8.3 (L) 10/19/2021    HCT 25.4 (L) 10/19/2021     10/19/2021     10/19/2021    POTASSIUM 4.1 10/19/2021    CHLORIDE 107 10/19/2021    CO2 30 10/19/2021      (H) 10/19/2021    BUN 9 10/19/2021    CR 0.56 10/19/2021    MAG 2.4 (H) 10/19/2021    INR 0.99 10/14/2021    BILITOTAL 0.3 10/19/2021    AST 12 10/19/2021    ALT 18 10/19/2021    ALKPHOS 64 10/19/2021    PROTTOTAL 5.2 (L) 10/19/2021    ALBUMIN 2.7 (L) 10/19/2021           SYSTEMS-BASED ASSESSMENT AND PLAN     Mil Seals is a 60 yo Day +4 s/p auto PBSCT for MM      1. BMT/IEC PROTOCOL    MT 2016-35    Day-1 (10/14/2021) will get Melphalan and flush     Day 0 (10/15/2021) Transplant: received half of her collected cells:  Total cell dose=10.8 X10^6 CD34/kg    G-CSF starts on day+5 (10/20/2021)     2. HEME/COAG    Risk of cytopenias due to chemotherapy and radiation    Transfusion parameters: hemoglobin <7, platelets <10    Anemia 2/2 chemo    Relevant thrombosis or bleeding history: none     3. ID: afebrile.    Prophylaxis plan: fluconazole, ACV, Levaquin     4. CARDIOVASCULAR    Risk of cardiomyopathy:  Baseline EF 3D LVEF volumetric analysis is 61.8%.    Risk of arrhythmia: Baseline EKG showed sinus rythym with sinus arrythmia    BHl=705, consider recheck after starts levaquin/zofran      5. RESPIRATORY    Baseline PFTs: DLCO seble=97    Risk of respiratory complications: Frequent ambulation and incentive spirometer.    History of asthma     6. GI/NUTRITION    Ulcer prophylaxis: protonix    Nausea 2/2 chemo/BMT: Zofran tid, trying scopolamine patch since 10/18; it isn't helping much. Add zyprexa 2.5mg bid, scheduled.  Should check QTc on Thurs/Friday of this week.     Constipation: Colace, prune juice prn.       7. RENAL/ELECTROLYTES/    1L NS daily prn; repeat 10/19    Electrolyte management: replace per sliding scale     8. DIABETES/ENDOCRINE    Risk of steroid-induced hyperglyemia: Monitor BG, sliding scale if needed    Bone health: calcium and vitamin D    US thyroid: 9/24/2021: Bilateral thyroid nodules. Among these, 1.1 cm left thyroid lobe meets the criteria for biopsy based on FDG  avidity on recent PET/CT exam dated 9/21/2021. Per  close note: t Since most thyroid cancers that arise in the context of multinodular thyroid are slow growing and this nodule was diagnosed incidentally, will proceed with transplant and  biopsy this at count recovery, day 28 auto. Dr Joyner notified [place] by email to get biopsy scheduled then.     9.  Neuropathy:  cont gabapentin     10.  Mood:  lamictal as mood stabilizer and prasozin for her PTSD    Today's summary: 1 L NS bolus, zyprexa bid; plan for EKG on 10/21-22 to check QTc (on levaquin, zofran, zyprexa).  RTC daily for labs, follow-up, IVF (appts thru Sun 10/24 currently)    I spent 20 minutes in the care of this patient today, which included time necessary for preparation for the visit, obtaining history, ordering medications/tests/procedures as medically indicated, review of pertinent medical literature, counseling of the patient, communication of recommendations to the care team, and documentation time.    Laly Ye PA-C  10/19/2021

## 2021-10-19 NOTE — LETTER
10/19/2021         RE: Mil Seals  [address]  [facility] [address]        Dear Colleague,    Thank you for referring your patient, Mil Seals, to the [facility] BLOOD AND MARROW TRANSPLANT PROGRAM [place]. Please see a copy of my visit note below.    Infusion Nursing Note:  Mil Seals presents today for add-on infusion.   Patient seen by provider today: Yes: Laly   present during visit today: Not Applicable.    Note: Patient received 1 L NS bolus per provider's request.      Intravenous Access:  Olmedo.    Treatment Conditions:  Results reviewed, labs MET treatment parameters, ok to proceed with treatment.      Post Infusion Assessment:  Patient tolerated infusion without incident.       Discharge Plan:   Patient and/or family verbalized understanding of discharge instructions and all questions answered.      Thu Duong RN                          Again, thank you for allowing me to participate in the care of your patient.        Sincerely,        [facility]

## 2021-10-19 NOTE — PROGRESS NOTES
Infusion Nursing Note:  Mil Seals presents today for add-on infusion.   Patient seen by provider today: Yes: Laly   present during visit today: Not Applicable.    Note: Patient received 1 L NS bolus per provider's request.      Intravenous Access:  Olmedo.    Treatment Conditions:  Results reviewed, labs MET treatment parameters, ok to proceed with treatment.      Post Infusion Assessment:  Patient tolerated infusion without incident.       Discharge Plan:   Patient and/or family verbalized understanding of discharge instructions and all questions answered.      Thu Duong RN

## 2021-10-20 ENCOUNTER — ONCOLOGY VISIT (OUTPATIENT)
Dept: TRANSPLANT | Facility: CLINIC | Age: 61
End: 2021-10-20
Attending: INTERNAL MEDICINE
Payer: MEDICAID

## 2021-10-20 ENCOUNTER — APPOINTMENT (OUTPATIENT)
Dept: LAB | Facility: CLINIC | Age: 61
End: 2021-10-20
Attending: INTERNAL MEDICINE
Payer: MEDICAID

## 2021-10-20 VITALS
TEMPERATURE: 98.3 F | OXYGEN SATURATION: 99 % | HEART RATE: 94 BPM | SYSTOLIC BLOOD PRESSURE: 115 MMHG | RESPIRATION RATE: 16 BRPM | DIASTOLIC BLOOD PRESSURE: 70 MMHG

## 2021-10-20 DIAGNOSIS — C90.00 MULTIPLE MYELOMA, REMISSION STATUS UNSPECIFIED (H): ICD-10-CM

## 2021-10-20 DIAGNOSIS — Z52.001 STEM CELL DONOR: ICD-10-CM

## 2021-10-20 DIAGNOSIS — C90.00 MULTIPLE MYELOMA, REMISSION STATUS UNSPECIFIED (H): Primary | ICD-10-CM

## 2021-10-20 LAB
ALBUMIN SERPL-MCNC: 2.7 G/DL (ref 3.4–5)
ALP SERPL-CCNC: 62 U/L (ref 40–150)
ALT SERPL W P-5'-P-CCNC: 20 U/L (ref 0–50)
ANION GAP SERPL CALCULATED.3IONS-SCNC: 3 MMOL/L (ref 3–14)
AST SERPL W P-5'-P-CCNC: 15 U/L (ref 0–45)
BASOPHILS # BLD MANUAL: 0 10E3/UL (ref 0–0.2)
BASOPHILS NFR BLD MANUAL: 1 %
BILIRUB DIRECT SERPL-MCNC: <0.1 MG/DL (ref 0–0.2)
BILIRUB SERPL-MCNC: 0.3 MG/DL (ref 0.2–1.3)
BUN SERPL-MCNC: 8 MG/DL (ref 7–30)
CALCIUM SERPL-MCNC: 7.8 MG/DL (ref 8.5–10.1)
CHLORIDE BLD-SCNC: 108 MMOL/L (ref 94–109)
CO2 SERPL-SCNC: 28 MMOL/L (ref 20–32)
CREAT SERPL-MCNC: 0.55 MG/DL (ref 0.52–1.04)
EOSINOPHIL # BLD MANUAL: 0 10E3/UL (ref 0–0.7)
EOSINOPHIL NFR BLD MANUAL: 2 %
ERYTHROCYTE [DISTWIDTH] IN BLOOD BY AUTOMATED COUNT: 14.6 % (ref 10–15)
FRAGMENTS BLD QL SMEAR: SLIGHT
GFR SERPL CREATININE-BSD FRML MDRD: >90 ML/MIN/1.73M2
GLUCOSE BLD-MCNC: 115 MG/DL (ref 70–99)
HCT VFR BLD AUTO: 26.1 % (ref 35–47)
HGB BLD-MCNC: 8.6 G/DL (ref 11.7–15.7)
LYMPHOCYTES # BLD MANUAL: 0.1 10E3/UL (ref 0.8–5.3)
LYMPHOCYTES NFR BLD MANUAL: 11 %
MAGNESIUM SERPL-MCNC: 2.3 MG/DL (ref 1.6–2.3)
MCH RBC QN AUTO: 30.5 PG (ref 26.5–33)
MCHC RBC AUTO-ENTMCNC: 33 G/DL (ref 31.5–36.5)
MCV RBC AUTO: 93 FL (ref 78–100)
MONOCYTES # BLD MANUAL: 0 10E3/UL (ref 0–1.3)
MONOCYTES NFR BLD MANUAL: 1 %
NEUTROPHILS # BLD MANUAL: 0.4 10E3/UL (ref 1.6–8.3)
NEUTROPHILS NFR BLD MANUAL: 85 %
PLAT MORPH BLD: ABNORMAL
PLATELET # BLD AUTO: 161 10E3/UL (ref 150–450)
POTASSIUM BLD-SCNC: 4 MMOL/L (ref 3.4–5.3)
PROT SERPL-MCNC: 5.3 G/DL (ref 6.8–8.8)
RBC # BLD AUTO: 2.82 10E6/UL (ref 3.8–5.2)
RBC MORPH BLD: ABNORMAL
SODIUM SERPL-SCNC: 139 MMOL/L (ref 133–144)
WBC # BLD AUTO: 0.5 10E3/UL (ref 4–11)

## 2021-10-20 PROCEDURE — 83735 ASSAY OF MAGNESIUM: CPT

## 2021-10-20 PROCEDURE — 250N000011 HC RX IP 250 OP 636: Performed by: PHYSICIAN ASSISTANT

## 2021-10-20 PROCEDURE — 99213 OFFICE O/P EST LOW 20 MIN: CPT

## 2021-10-20 PROCEDURE — 36592 COLLECT BLOOD FROM PICC: CPT

## 2021-10-20 PROCEDURE — 250N000011 HC RX IP 250 OP 636: Performed by: INTERNAL MEDICINE

## 2021-10-20 PROCEDURE — 85027 COMPLETE CBC AUTOMATED: CPT

## 2021-10-20 PROCEDURE — 96372 THER/PROPH/DIAG INJ SC/IM: CPT | Performed by: PHYSICIAN ASSISTANT

## 2021-10-20 PROCEDURE — 80053 COMPREHEN METABOLIC PANEL: CPT

## 2021-10-20 PROCEDURE — 258N000003 HC RX IP 258 OP 636: Performed by: PHYSICIAN ASSISTANT

## 2021-10-20 RX ORDER — HEPARIN SODIUM (PORCINE) LOCK FLUSH IV SOLN 100 UNIT/ML 100 UNIT/ML
5 SOLUTION INTRAVENOUS
Status: CANCELLED | OUTPATIENT
Start: 2021-10-20

## 2021-10-20 RX ORDER — HEPARIN SODIUM,PORCINE 10 UNIT/ML
5 VIAL (ML) INTRAVENOUS
Status: CANCELLED | OUTPATIENT
Start: 2021-10-21

## 2021-10-20 RX ORDER — HEPARIN SODIUM,PORCINE 10 UNIT/ML
5 VIAL (ML) INTRAVENOUS
Status: CANCELLED | OUTPATIENT
Start: 2021-10-20

## 2021-10-20 RX ORDER — HEPARIN SODIUM,PORCINE 10 UNIT/ML
5 VIAL (ML) INTRAVENOUS ONCE
Status: COMPLETED | OUTPATIENT
Start: 2021-10-20 | End: 2021-10-20

## 2021-10-20 RX ORDER — HEPARIN SODIUM (PORCINE) LOCK FLUSH IV SOLN 100 UNIT/ML 100 UNIT/ML
5 SOLUTION INTRAVENOUS
Status: CANCELLED | OUTPATIENT
Start: 2021-10-21

## 2021-10-20 RX ADMIN — SODIUM CHLORIDE 1000 ML: 9 INJECTION, SOLUTION INTRAVENOUS at 12:58

## 2021-10-20 RX ADMIN — FILGRASTIM 480 MCG: 480 INJECTION, SOLUTION INTRAVENOUS; SUBCUTANEOUS at 13:40

## 2021-10-20 RX ADMIN — Medication 5 ML: at 12:23

## 2021-10-20 ASSESSMENT — PAIN SCALES - GENERAL: PAINLEVEL: NO PAIN (1)

## 2021-10-20 NOTE — NURSING NOTE
Chief Complaint   Patient presents with     Blood Draw     Labs drawn via cvc by RN in lab. VS taken.      Labs collected from CVC by RN. Brown line flushed with saline and heparin, blue line heparin locked. Vitals taken. Pt checked in for appointment(s).    Chelly Burgess RN

## 2021-10-20 NOTE — NURSING NOTE
"Oncology Rooming Note    October 20, 2021 12:42 PM   Mil Seals is a 61 year old female who presents for:    Chief Complaint   Patient presents with     Blood Draw     Labs drawn via cvc by RN in lab. VS taken.      RECHECK     Pt is here for a rtn for a MM     Initial Vitals: Blood Pressure 115/70 (BP Location: Right arm, Patient Position: Sitting, Cuff Size: Adult Regular)   Pulse 94   Temperature 98.3  F (36.8  C) (Oral)   Respiration 16   Oxygen Saturation 99%  Estimated body mass index is 26.26 kg/m  as calculated from the following:    Height as of 10/18/21: 1.68 m (5' 6.14\").    Weight as of 10/18/21: 74.1 kg (163 lb 6.4 oz). There is no height or weight on file to calculate BSA.  No Pain (1) Comment: more of an ache per pt.    No LMP recorded. Patient is postmenopausal.  Allergies reviewed: Yes  Medications reviewed: Yes    Medications: Medication refills not needed today.  Pharmacy name entered into [facility]: [id number] - [place], [place] - [address]    Clinical concerns: none       Gertrude Rubalcava MA            "

## 2021-10-20 NOTE — PROGRESS NOTES
"BMT Clinic Note   10/21/2021    Patient ID:  Mil Seals is a 61 year old woman D+6 s/p auto PBSCT for MM.     Diagnosis MM Multiple myeloma  BMTCT Type Autologous    Prep Regimen Melphalan   Primary BMT MD Dr. Sridhar Cullen   Clinical Trials   [id number]         INTERVAL  HISTORY   C/O feeling very dizzy & unsteady. T max 100, no chills.  Occasional cough from asthma.  Nausea, but no emesis.  Taking Zofran, Ativan, Olanzinpe & Scop patch.  No diarrhea.  Feels \"toxic\" from GCSF.  Mild bone pain.  No bleeding or rash  Review of Systems: 8 point ROS negative except as noted above.    PHYSICAL EXAM     KPS:  70    BP 93/50 (BP Location: Right arm, Patient Position: Sitting, Cuff Size: Adult Regular)   Pulse 103   Temp 98.1  F (36.7  C) (Oral)   Resp 16   SpO2 98%      General: fatigued; NAD  Eyes: sclera anicteric   Nose/Mouth/Throat: OP moist, no ulcerations   Lungs: CTAB  Cardiovascular: RRR no mrg  Abdominal/Rectal: normoactive bs, soft, not tender, not distended  Lymphatics: no edema  Skin: no rashes or petechiae  Neuro: A&O   Access: CVC     Lab Results   Component Value Date    WBC 0.2 (LL) 10/21/2021    ANEU 0.4 (LL) 10/20/2021    HGB 8.9 (L) 10/21/2021    HCT 27.1 (L) 10/21/2021     (L) 10/21/2021     10/21/2021    POTASSIUM 3.8 10/21/2021    CHLORIDE 110 (H) 10/21/2021    CO2 28 10/21/2021     (H) 10/21/2021    BUN 9 10/21/2021    CR 0.55 10/21/2021    MAG 2.2 10/21/2021    INR 0.99 10/14/2021    BILITOTAL 0.2 10/21/2021    AST 26 10/21/2021    ALT 36 10/21/2021    ALKPHOS 64 10/21/2021    PROTTOTAL 5.6 (L) 10/21/2021    ALBUMIN 2.9 (L) 10/21/2021       SYSTEMS-BASED ASSESSMENT AND PLAN     Mil Seals is a 60 yo Day +6 s/p auto PBSCT for MM      1. BMT/IEC PROTOCOL    MT 2016-35    Day-1 (10/14/2021) will get Melphalan and flush     Day 0 (10/15/2021) Transplant: received half of her collected cells:  Total cell dose=10.8 X10^6 CD34/kg    G-CSF daily until WBC recovers. "  Taking daily Claritin     2. HEME/COAG    Transfusion parameters: hemoglobin <7, platelets <10     3. ID: low grade fever, no localizing symptoms   Prophylaxis plan: fluconazole, ACV, Levaquin   Begin Bactrim at day +28     4. GI/NUTRITION    Ulcer prophylaxis: protonix  7. Nausea 2/2 chemo/BMT: Zofran tid, zyprexa 2.5mg bid, with PRN Ativan.  Has Scop patch on, but I cautioned her about continuing that in light of her dizziness;     5.  FEN    1L NS today    Electrolyte management: replace per sliding scale     6.   ENDOCRINE    US thyroid: 9/24/2021: Bilateral thyroid nodules. Among these, 1.1 cm left thyroid lobe meets the criteria for biopsy based on FDG avidity on recent PET/CT exam dated 9/21/2021. Per  close note: t Since most thyroid cancers that arise in the context of multinodular thyroid are slow growing and this nodule was diagnosed incidentally, will proceed with transplant and  biopsy this at count recovery, day 28 auto. Dr Joyner notified [place] by email to get biopsy scheduled then.     7.  Neuropathy:  cont gabapentin     8.  Mood:  lamictal as mood stabilizer.   prasozin for her PTSD is on hold    9.  CV:  check QTc tomorrow  (on levaquin, zofran, zyprexa).    RTC daily for labs, follow-up, IVF    I spent 30 minutes in the care of this patient today, which included time necessary for preparation for the visit, obtaining history, ordering medications/tests/procedures as medically indicated, review of pertinent medical literature, counseling of the patient, communication of recommendations to the care team, and documentation time.    Louise Russo

## 2021-10-20 NOTE — LETTER
10/20/2021         RE: Mil Seals  [address]  [facility] [address]        Dear Colleague,    Thank you for referring your patient, Mil Seals, to the [facility] BLOOD AND MARROW TRANSPLANT PROGRAM [place]. Please see a copy of my visit note below.    BMT Clinic Note   10/20/2021    Patient ID:  Mil Seals is a 61 year old woman D+5 s/p auto PBSCT for MM.     Diagnosis MM Multiple myeloma  BMTCT Type Autologous    Prep Regimen Melphalan   Primary BMT MD Dr. Sridhar Cullen   Clinical Trials   [id number]         INTERVAL  HISTORY   Persistent nausea and fatigue.  No vomiting, no diarrhea.  No rash. No bleeding.      Review of Systems: 8 point ROS negative except as noted above.      PHYSICAL EXAM     Weight In/Out     Wt Readings from Last 3 Encounters:   10/18/21 74.1 kg (163 lb 6.4 oz)   10/17/21 73.7 kg (162 lb 8 oz)   10/16/21 72.8 kg (160 lb 6.4 oz)             KPS:  70    /70 (BP Location: Right arm, Patient Position: Sitting, Cuff Size: Adult Regular)   Pulse 94   Temp 98.3  F (36.8  C) (Oral)   Resp 16   SpO2 99%      General: fatigued; pale; NAD  Eyes: sclera anicteric   Nose/Mouth/Throat: OP moist, no ulcerations   Lungs: CTAB  Cardiovascular: RRR no mrg  Abdominal/Rectal: normoactive bs, soft, not tender, not distended  Lymphatics: no edema  Skin: no rashes or petechiae  Neuro: A&O   Access: CVC       LABS AND IMAGING: I have assessed all abnormal lab values for their clinical significance and any values considered clinically significant have been addressed in the assessment and plan.      Lab Results   Component Value Date    WBC 0.5 (LL) 10/20/2021    ANEU 0.4 (LL) 10/20/2021    HGB 8.6 (L) 10/20/2021    HCT 26.1 (L) 10/20/2021     10/20/2021     10/20/2021    POTASSIUM 4.0 10/20/2021    CHLORIDE 108 10/20/2021    CO2 28 10/20/2021     (H) 10/20/2021    BUN 8 10/20/2021    CR 0.55 10/20/2021    MAG 2.3 10/20/2021    INR 0.99 10/14/2021     BILITOTAL 0.3 10/20/2021    AST 15 10/20/2021    ALT 20 10/20/2021    ALKPHOS 62 10/20/2021    PROTTOTAL 5.3 (L) 10/20/2021    ALBUMIN 2.7 (L) 10/20/2021           SYSTEMS-BASED ASSESSMENT AND PLAN     Mil Seals is a 62 yo Day +5 s/p auto PBSCT for MM      1. BMT/IEC PROTOCOL    MT 2016-35    Day-1 (10/14/2021) will get Melphalan and flush     Day 0 (10/15/2021) Transplant: received half of her collected cells:  Total cell dose=10.8 X10^6 CD34/kg    G-CSF starts on day+5 (10/20/2021)     2. HEME/COAG    Risk of cytopenias due to chemotherapy and radiation    Transfusion parameters: hemoglobin <7, platelets <10    Anemia 2/2 chemo    Relevant thrombosis or bleeding history: none     3. ID: afebrile.    Prophylaxis plan: fluconazole, ACV, Levaquin     4. CARDIOVASCULAR    Risk of cardiomyopathy:  Baseline EF 3D LVEF volumetric analysis is 61.8%.    Risk of arrhythmia: Baseline EKG showed sinus rythym with sinus arrythmia    EBn=334, consider recheck after starts levaquin/zofran      5. RESPIRATORY    Baseline PFTs: DLCO seble=97    Risk of respiratory complications: Frequent ambulation and incentive spirometer.    History of asthma     6. GI/NUTRITION    Ulcer prophylaxis: protonix    Nausea 2/2 chemo/BMT: Zofran tid, trying scopolamine patch since 10/18; it isn't helping much. Added zyprexa 2.5mg bid, scheduled, on 10/19.  Should check QTc on Thurs/Friday of this week.     Constipation: Colace, prune juice prn.       7. RENAL/ELECTROLYTES/    1L NS daily prn; repeat 10/20    Electrolyte management: replace per sliding scale     8. DIABETES/ENDOCRINE    Risk of steroid-induced hyperglyemia: Monitor BG, sliding scale if needed    Bone health: calcium and vitamin D    US thyroid: 9/24/2021: Bilateral thyroid nodules. Among these, 1.1 cm left thyroid lobe meets the criteria for biopsy based on FDG avidity on recent PET/CT exam dated 9/21/2021. Per  close note: t Since most thyroid cancers that arise in  the context of multinodular thyroid are slow growing and this nodule was diagnosed incidentally, will proceed with transplant and  biopsy this at count recovery, day 28 auto. Dr Joyner notified [place] by email to get biopsy scheduled then.     9.  Neuropathy:  cont gabapentin     10.  Mood:  lamictal as mood stabilizer and prasozin for her PTSD    Today's summary: 1 L NS bolus; plan for EKG on 10/21-22 to check QTc (on levaquin, zofran, zyprexa).  RTC daily for labs, follow-up, IVF (appts thru Sun 10/24 currently)    I spent 20 minutes in the care of this patient today, which included time necessary for preparation for the visit, obtaining history, ordering medications/tests/procedures as medically indicated, review of pertinent medical literature, counseling of the patient, communication of recommendations to the care team, and documentation time.    Laly Ye PA-C  10/20/2021                 Again, thank you for allowing me to participate in the care of your patient.        Sincerely,        BMT Advanced Practice Provider

## 2021-10-20 NOTE — LETTER
10/20/2021         RE: Mil Seals  [address]  [facility] [address]        Dear Colleague,    Thank you for referring your patient, Mil Seals, to the [facility] BLOOD AND MARROW TRANSPLANT PROGRAM [place]. Please see a copy of my visit note below.    Infusion Nursing Note:  Mil Seals presents today for possible infusion.    Patient seen by provider today: Yes: HILARIA Edge   present during visit today: Not Applicable.    Note: Patient received 1L NS bolus for poor PO intake and nausea.    Neupogen 480 mcg subcutaneous injection given to right lower abdomen for ANC of 0.4.      Intravenous Access:  Olmedo.    Treatment Conditions:  Lab Results   Component Value Date    HGB 8.6 (L) 10/20/2021    WBC 0.5 (LL) 10/20/2021    ANEU 0.4 (LL) 10/20/2021    ANEUTAUTO 8.8 (H) 10/17/2021     10/20/2021          Post Infusion Assessment:  Patient tolerated infusion without incident.  Patient tolerated injection without incident.       Discharge Plan:   Patient discharged in stable condition accompanied by: self.  Departure Mode: Ambulatory.      Marly Camarillo RN                          Again, thank you for allowing me to participate in the care of your patient.        Sincerely,        [facility]

## 2021-10-20 NOTE — PROGRESS NOTES
BMT Clinic Note   10/20/2021    Patient ID:  Mil Seals is a 61 year old woman D+5 s/p auto PBSCT for MM.     Diagnosis MM Multiple myeloma  BMTCT Type Autologous    Prep Regimen Melphalan   Primary BMT MD Dr. Sridhar Cullen   Clinical Trials   [id number]         INTERVAL  HISTORY   Persistent nausea and fatigue.  No vomiting, no diarrhea.  No rash. No bleeding.      Review of Systems: 8 point ROS negative except as noted above.      PHYSICAL EXAM     Weight In/Out     Wt Readings from Last 3 Encounters:   10/18/21 74.1 kg (163 lb 6.4 oz)   10/17/21 73.7 kg (162 lb 8 oz)   10/16/21 72.8 kg (160 lb 6.4 oz)             KPS:  70    /70 (BP Location: Right arm, Patient Position: Sitting, Cuff Size: Adult Regular)   Pulse 94   Temp 98.3  F (36.8  C) (Oral)   Resp 16   SpO2 99%      General: fatigued; pale; NAD  Eyes: sclera anicteric   Nose/Mouth/Throat: OP moist, no ulcerations   Lungs: CTAB  Cardiovascular: RRR no mrg  Abdominal/Rectal: normoactive bs, soft, not tender, not distended  Lymphatics: no edema  Skin: no rashes or petechiae  Neuro: A&O   Access: CVC       LABS AND IMAGING: I have assessed all abnormal lab values for their clinical significance and any values considered clinically significant have been addressed in the assessment and plan.      Lab Results   Component Value Date    WBC 0.5 (LL) 10/20/2021    ANEU 0.4 (LL) 10/20/2021    HGB 8.6 (L) 10/20/2021    HCT 26.1 (L) 10/20/2021     10/20/2021     10/20/2021    POTASSIUM 4.0 10/20/2021    CHLORIDE 108 10/20/2021    CO2 28 10/20/2021     (H) 10/20/2021    BUN 8 10/20/2021    CR 0.55 10/20/2021    MAG 2.3 10/20/2021    INR 0.99 10/14/2021    BILITOTAL 0.3 10/20/2021    AST 15 10/20/2021    ALT 20 10/20/2021    ALKPHOS 62 10/20/2021    PROTTOTAL 5.3 (L) 10/20/2021    ALBUMIN 2.7 (L) 10/20/2021           SYSTEMS-BASED ASSESSMENT AND PLAN     Mil Seals is a 62 yo Day +5 s/p auto PBSCT for MM      1. BMT/IEC  PROTOCOL    MT 2016-35    Day-1 (10/14/2021) will get Melphalan and flush     Day 0 (10/15/2021) Transplant: received half of her collected cells:  Total cell dose=10.8 X10^6 CD34/kg    G-CSF starts on day+5 (10/20/2021)     2. HEME/COAG    Risk of cytopenias due to chemotherapy and radiation    Transfusion parameters: hemoglobin <7, platelets <10    Anemia 2/2 chemo    Relevant thrombosis or bleeding history: none     3. ID: afebrile.    Prophylaxis plan: fluconazole, ACV, Levaquin     4. CARDIOVASCULAR    Risk of cardiomyopathy:  Baseline EF 3D LVEF volumetric analysis is 61.8%.    Risk of arrhythmia: Baseline EKG showed sinus rythym with sinus arrythmia    UWx=405, consider recheck after starts levaquin/zofran      5. RESPIRATORY    Baseline PFTs: DLCO seble=97    Risk of respiratory complications: Frequent ambulation and incentive spirometer.    History of asthma     6. GI/NUTRITION    Ulcer prophylaxis: protonix    Nausea 2/2 chemo/BMT: Zofran tid, trying scopolamine patch since 10/18; it isn't helping much. Added zyprexa 2.5mg bid, scheduled, on 10/19.  Should check QTc on Thurs/Friday of this week.     Constipation: Colace, prune juice prn.       7. RENAL/ELECTROLYTES/    1L NS daily prn; repeat 10/20    Electrolyte management: replace per sliding scale     8. DIABETES/ENDOCRINE    Risk of steroid-induced hyperglyemia: Monitor BG, sliding scale if needed    Bone health: calcium and vitamin D    US thyroid: 9/24/2021: Bilateral thyroid nodules. Among these, 1.1 cm left thyroid lobe meets the criteria for biopsy based on FDG avidity on recent PET/CT exam dated 9/21/2021. Per  close note: t Since most thyroid cancers that arise in the context of multinodular thyroid are slow growing and this nodule was diagnosed incidentally, will proceed with transplant and  biopsy this at count recovery, day 28 auto. Dr Joyner notified [place] by email to get biopsy scheduled then.     9.  Neuropathy:  cont gabapentin     10.   Mood:  lamictal as mood stabilizer and prasozin for her PTSD    Today's summary: 1 L NS bolus; plan for EKG on 10/21-22 to check QTc (on levaquin, zofran, zyprexa).  RTC daily for labs, follow-up, IVF (appts thru Sun 10/24 currently)    I spent 20 minutes in the care of this patient today, which included time necessary for preparation for the visit, obtaining history, ordering medications/tests/procedures as medically indicated, review of pertinent medical literature, counseling of the patient, communication of recommendations to the care team, and documentation time.    Laly Ye PA-C  10/20/2021

## 2021-10-20 NOTE — PROGRESS NOTES
Infusion Nursing Note:  Mil Seals presents today for possible infusion.    Patient seen by provider today: Yes: HILARIA Edge   present during visit today: Not Applicable.    Note: Patient received 1L NS bolus for poor PO intake and nausea.    Neupogen 480 mcg subcutaneous injection given to right lower abdomen for ANC of 0.4.      Intravenous Access:  Olmedo.    Treatment Conditions:  Lab Results   Component Value Date    HGB 8.6 (L) 10/20/2021    WBC 0.5 (LL) 10/20/2021    ANEU 0.4 (LL) 10/20/2021    ANEUTAUTO 8.8 (H) 10/17/2021     10/20/2021          Post Infusion Assessment:  Patient tolerated infusion without incident.  Patient tolerated injection without incident.       Discharge Plan:   Patient discharged in stable condition accompanied by: self.  Departure Mode: Ambulatory.      Marly Camarillo RN                       Left fifth toe hammertoe and Left first metatarsal bunion

## 2021-10-21 ENCOUNTER — APPOINTMENT (OUTPATIENT)
Dept: LAB | Facility: CLINIC | Age: 61
End: 2021-10-21
Attending: INTERNAL MEDICINE
Payer: MEDICAID

## 2021-10-21 ENCOUNTER — ONCOLOGY VISIT (OUTPATIENT)
Dept: TRANSPLANT | Facility: CLINIC | Age: 61
End: 2021-10-21
Attending: INTERNAL MEDICINE
Payer: MEDICAID

## 2021-10-21 VITALS
SYSTOLIC BLOOD PRESSURE: 93 MMHG | HEART RATE: 103 BPM | TEMPERATURE: 98.1 F | DIASTOLIC BLOOD PRESSURE: 50 MMHG | OXYGEN SATURATION: 98 % | RESPIRATION RATE: 16 BRPM

## 2021-10-21 DIAGNOSIS — C90.00 MULTIPLE MYELOMA, REMISSION STATUS UNSPECIFIED (H): ICD-10-CM

## 2021-10-21 DIAGNOSIS — C90.00 MULTIPLE MYELOMA, REMISSION STATUS UNSPECIFIED (H): Primary | ICD-10-CM

## 2021-10-21 DIAGNOSIS — Z52.001 STEM CELL DONOR: ICD-10-CM

## 2021-10-21 LAB
ALBUMIN SERPL-MCNC: 2.9 G/DL (ref 3.4–5)
ALP SERPL-CCNC: 64 U/L (ref 40–150)
ALT SERPL W P-5'-P-CCNC: 36 U/L (ref 0–50)
ANION GAP SERPL CALCULATED.3IONS-SCNC: 3 MMOL/L (ref 3–14)
AST SERPL W P-5'-P-CCNC: 26 U/L (ref 0–45)
BILIRUB DIRECT SERPL-MCNC: <0.1 MG/DL (ref 0–0.2)
BILIRUB SERPL-MCNC: 0.2 MG/DL (ref 0.2–1.3)
BLD PROD TYP BPU: NORMAL
BLD PROD TYP BPU: NORMAL
BLOOD COMPONENT TYPE: NORMAL
BLOOD COMPONENT TYPE: NORMAL
BUN SERPL-MCNC: 9 MG/DL (ref 7–30)
CALCIUM SERPL-MCNC: 8.2 MG/DL (ref 8.5–10.1)
CHLORIDE BLD-SCNC: 110 MMOL/L (ref 94–109)
CO2 SERPL-SCNC: 28 MMOL/L (ref 20–32)
CODING SYSTEM: NORMAL
CODING SYSTEM: NORMAL
CREAT SERPL-MCNC: 0.55 MG/DL (ref 0.52–1.04)
CROSSMATCH: NORMAL
CROSSMATCH: NORMAL
ERYTHROCYTE [DISTWIDTH] IN BLOOD BY AUTOMATED COUNT: 14.6 % (ref 10–15)
GFR SERPL CREATININE-BSD FRML MDRD: >90 ML/MIN/1.73M2
GLUCOSE BLD-MCNC: 128 MG/DL (ref 70–99)
HCT VFR BLD AUTO: 27.1 % (ref 35–47)
HGB BLD-MCNC: 8.9 G/DL (ref 11.7–15.7)
MAGNESIUM SERPL-MCNC: 2.2 MG/DL (ref 1.6–2.3)
MCH RBC QN AUTO: 30.5 PG (ref 26.5–33)
MCHC RBC AUTO-ENTMCNC: 32.8 G/DL (ref 31.5–36.5)
MCV RBC AUTO: 93 FL (ref 78–100)
PLATELET # BLD AUTO: 112 10E3/UL (ref 150–450)
POTASSIUM BLD-SCNC: 3.8 MMOL/L (ref 3.4–5.3)
PROT SERPL-MCNC: 5.6 G/DL (ref 6.8–8.8)
RBC # BLD AUTO: 2.92 10E6/UL (ref 3.8–5.2)
SODIUM SERPL-SCNC: 141 MMOL/L (ref 133–144)
UNIT ABO/RH: NORMAL
UNIT ABO/RH: NORMAL
UNIT NUMBER: NORMAL
UNIT NUMBER: NORMAL
UNIT STATUS: NORMAL
UNIT STATUS: NORMAL
UNIT TYPE ISBT: 5100
UNIT TYPE ISBT: 5100
WBC # BLD AUTO: 0.2 10E3/UL (ref 4–11)

## 2021-10-21 PROCEDURE — 80053 COMPREHEN METABOLIC PANEL: CPT

## 2021-10-21 PROCEDURE — 250N000011 HC RX IP 250 OP 636: Performed by: INTERNAL MEDICINE

## 2021-10-21 PROCEDURE — 85027 COMPLETE CBC AUTOMATED: CPT

## 2021-10-21 PROCEDURE — 258N000003 HC RX IP 258 OP 636: Performed by: INTERNAL MEDICINE

## 2021-10-21 PROCEDURE — 96372 THER/PROPH/DIAG INJ SC/IM: CPT | Mod: XS | Performed by: PHYSICIAN ASSISTANT

## 2021-10-21 PROCEDURE — G0463 HOSPITAL OUTPT CLINIC VISIT: HCPCS | Mod: 25

## 2021-10-21 PROCEDURE — 83735 ASSAY OF MAGNESIUM: CPT

## 2021-10-21 PROCEDURE — 36592 COLLECT BLOOD FROM PICC: CPT

## 2021-10-21 PROCEDURE — 96360 HYDRATION IV INFUSION INIT: CPT

## 2021-10-21 PROCEDURE — 99214 OFFICE O/P EST MOD 30 MIN: CPT

## 2021-10-21 PROCEDURE — 250N000011 HC RX IP 250 OP 636: Performed by: PHYSICIAN ASSISTANT

## 2021-10-21 RX ORDER — HEPARIN SODIUM,PORCINE 10 UNIT/ML
5 VIAL (ML) INTRAVENOUS
Status: CANCELLED | OUTPATIENT
Start: 2021-10-22

## 2021-10-21 RX ORDER — HEPARIN SODIUM,PORCINE 10 UNIT/ML
5 VIAL (ML) INTRAVENOUS
Status: CANCELLED | OUTPATIENT
Start: 2021-10-21

## 2021-10-21 RX ORDER — HEPARIN SODIUM (PORCINE) LOCK FLUSH IV SOLN 100 UNIT/ML 100 UNIT/ML
5 SOLUTION INTRAVENOUS
Status: CANCELLED | OUTPATIENT
Start: 2021-10-21

## 2021-10-21 RX ORDER — HEPARIN SODIUM,PORCINE 10 UNIT/ML
5 VIAL (ML) INTRAVENOUS
Status: DISCONTINUED | OUTPATIENT
Start: 2021-10-21 | End: 2021-10-21 | Stop reason: HOSPADM

## 2021-10-21 RX ORDER — HEPARIN SODIUM,PORCINE 10 UNIT/ML
5 VIAL (ML) INTRAVENOUS ONCE
Status: COMPLETED | OUTPATIENT
Start: 2021-10-21 | End: 2021-10-21

## 2021-10-21 RX ORDER — HEPARIN SODIUM (PORCINE) LOCK FLUSH IV SOLN 100 UNIT/ML 100 UNIT/ML
5 SOLUTION INTRAVENOUS
Status: CANCELLED | OUTPATIENT
Start: 2021-10-22

## 2021-10-21 RX ADMIN — FILGRASTIM 480 MCG: 480 INJECTION, SOLUTION INTRAVENOUS; SUBCUTANEOUS at 13:18

## 2021-10-21 RX ADMIN — Medication 5 ML: at 12:37

## 2021-10-21 RX ADMIN — SODIUM CHLORIDE, PRESERVATIVE FREE 5 ML: 5 INJECTION INTRAVENOUS at 14:30

## 2021-10-21 RX ADMIN — SODIUM CHLORIDE 1000 ML: 9 INJECTION, SOLUTION INTRAVENOUS at 12:56

## 2021-10-21 ASSESSMENT — PAIN SCALES - GENERAL: PAINLEVEL: MILD PAIN (3)

## 2021-10-21 NOTE — PROGRESS NOTES
Infusion Nursing Note:  Mil Seals presents today for 1 liter of IVF and gcsf injection.    Patient seen by provider today: Yes: Louise CHAMBERS   present during visit today: Not Applicable.    Note: Patient here for 1 liter of IVF as she was feeling dizzy per Louise. Patient tolerated infusion well without any issues or side affect. Patient also received gcsf injection in subcutaneous tissue. She tolerated injection well without any issues or side affects.       Intravenous Access:  Olmedo.    Treatment Conditions:  Lab Results   Component Value Date    HGB 8.9 (L) 10/21/2021    WBC 0.2 (LL) 10/21/2021    ANEU 0.4 (LL) 10/20/2021    ANEUTAUTO 8.8 (H) 10/17/2021     (L) 10/21/2021      Lab Results   Component Value Date     10/21/2021    POTASSIUM 3.8 10/21/2021    MAG 2.2 10/21/2021    CR 0.55 10/21/2021    BRUNA 8.2 (L) 10/21/2021    BILITOTAL 0.2 10/21/2021    ALBUMIN 2.9 (L) 10/21/2021    ALT 36 10/21/2021    AST 26 10/21/2021         Post Infusion Assessment:  Patient tolerated infusion without incident.  Patient tolerated injection without incident.  No evidence of extravasations.  Access discontinued per protocol.       Discharge Plan:   Patient discharged in stable condition accompanied by: caregiver.  Departure Mode: Wheelchair.      DUSTIN KRAUSE RN

## 2021-10-21 NOTE — NURSING NOTE
Chief Complaint   Patient presents with     Blood Draw     Labs drawn from CVC in lab by RN. VS taken.     Labs collected from CVC by RN, line flushed with saline and heparin.  Vitals taken. Pt checked in for appointment(s).  Levon Silva RN

## 2021-10-21 NOTE — NURSING NOTE
Chief Complaint   Patient presents with     Infusion     pt here IVF and gcsf injection s/p bmt for MM

## 2021-10-21 NOTE — LETTER
"    10/21/2021         RE: Mil Seals  [address]  [facility] [address]        Dear Colleague,    Thank you for referring your patient, Mil Seals, to the [facility] BLOOD AND MARROW TRANSPLANT PROGRAM [place]. Please see a copy of my visit note below.    BMT Clinic Note   10/21/2021    Patient ID:  Mil Seals is a 61 year old woman D+6 s/p auto PBSCT for MM.     Diagnosis MM Multiple myeloma  BMTCT Type Autologous    Prep Regimen Melphalan   Primary BMT MD Dr. Sridhar Cullen   Clinical Trials   [id number]         INTERVAL  HISTORY   C/O feeling very dizzy & unsteady. T max 100, no chills.  Occasional cough from asthma.  Nausea, but no emesis.  Taking Zofran, Ativan, Olanzinpe & Scop patch.  No diarrhea.  Feels \"toxic\" from GCSF.  Mild bone pain.  No bleeding or rash  Review of Systems: 8 point ROS negative except as noted above.    PHYSICAL EXAM     KPS:  70    BP 93/50 (BP Location: Right arm, Patient Position: Sitting, Cuff Size: Adult Regular)   Pulse 103   Temp 98.1  F (36.7  C) (Oral)   Resp 16   SpO2 98%      General: fatigued; NAD  Eyes: sclera anicteric   Nose/Mouth/Throat: OP moist, no ulcerations   Lungs: CTAB  Cardiovascular: RRR no mrg  Abdominal/Rectal: normoactive bs, soft, not tender, not distended  Lymphatics: no edema  Skin: no rashes or petechiae  Neuro: A&O   Access: CVC     Lab Results   Component Value Date    WBC 0.2 (LL) 10/21/2021    ANEU 0.4 (LL) 10/20/2021    HGB 8.9 (L) 10/21/2021    HCT 27.1 (L) 10/21/2021     (L) 10/21/2021     10/21/2021    POTASSIUM 3.8 10/21/2021    CHLORIDE 110 (H) 10/21/2021    CO2 28 10/21/2021     (H) 10/21/2021    BUN 9 10/21/2021    CR 0.55 10/21/2021    MAG 2.2 10/21/2021    INR 0.99 10/14/2021    BILITOTAL 0.2 10/21/2021    AST 26 10/21/2021    ALT 36 10/21/2021    ALKPHOS 64 10/21/2021    PROTTOTAL 5.6 (L) 10/21/2021    ALBUMIN 2.9 (L) 10/21/2021       SYSTEMS-BASED ASSESSMENT AND PLAN     Mil VILLANUEVA" Josafat Seals is a 60 yo Day +6 s/p auto PBSCT for MM      1. BMT/IEC PROTOCOL    MT 2016-35    Day-1 (10/14/2021) will get Melphalan and flush     Day 0 (10/15/2021) Transplant: received half of her collected cells:  Total cell dose=10.8 X10^6 CD34/kg    G-CSF daily until WBC recovers.  Taking daily Claritin     2. HEME/COAG    Transfusion parameters: hemoglobin <7, platelets <10     3. ID: low grade fever, no localizing symptoms   Prophylaxis plan: fluconazole, ACV, Levaquin   Begin Bactrim at day +28     4. GI/NUTRITION    Ulcer prophylaxis: protonix  7. Nausea 2/2 chemo/BMT: Zofran tid, zyprexa 2.5mg bid, with PRN Ativan.  Has Scop patch on, but I cautioned her about continuing that in light of her dizziness;     5.  FEN    1L NS today    Electrolyte management: replace per sliding scale     6.   ENDOCRINE    US thyroid: 9/24/2021: Bilateral thyroid nodules. Among these, 1.1 cm left thyroid lobe meets the criteria for biopsy based on FDG avidity on recent PET/CT exam dated 9/21/2021. Per  close note: t Since most thyroid cancers that arise in the context of multinodular thyroid are slow growing and this nodule was diagnosed incidentally, will proceed with transplant and  biopsy this at count recovery, day 28 auto. Dr Joyner notified [place] by email to get biopsy scheduled then.     7.  Neuropathy:  cont gabapentin     8.  Mood:  lamictal as mood stabilizer.   prasozin for her PTSD is on hold    9.  CV:  check QTc tomorrow  (on levaquin, zofran, zyprexa).    RTC daily for labs, follow-up, IVF    I spent 30 minutes in the care of this patient today, which included time necessary for preparation for the visit, obtaining history, ordering medications/tests/procedures as medically indicated, review of pertinent medical literature, counseling of the patient, communication of recommendations to the care team, and documentation time.    Louise Russo              Again, thank you for allowing me to participate in the  care of your patient.        Sincerely,        BMT Advanced Practice Provider

## 2021-10-21 NOTE — NURSING NOTE
"Oncology Rooming Note    October 21, 2021 1:02 PM   Mil Seals is a 61 year old female who presents for:    Chief Complaint   Patient presents with     Blood Draw     Labs drawn from CVC in lab by RN. VS taken.     RECHECK     pt here for recheck s/p bmt for MM     Initial Vitals: BP 93/50 (BP Location: Right arm, Patient Position: Sitting, Cuff Size: Adult Regular)   Pulse 103   Temp 98.1  F (36.7  C) (Oral)   Resp 16   SpO2 98%  Estimated body mass index is 26.26 kg/m  as calculated from the following:    Height as of 10/18/21: 1.68 m (5' 6.14\").    Weight as of 10/18/21: 74.1 kg (163 lb 6.4 oz). There is no height or weight on file to calculate BSA.  Mild Pain (3) Comment: Data Unavailable   No LMP recorded. Patient is postmenopausal.  Allergies reviewed: Yes  Medications reviewed: Yes    Medications: Medication refills not needed today.  Pharmacy name entered into [employer]: [id number] - [address]    Clinical concerns: feeling dizzy today Louise was notified.   IVF started per verbal order from Louise KRAUSE, RN              "

## 2021-10-21 NOTE — LETTER
10/21/2021         RE: Mil Seals  [address]  [facility] [address]        Dear Colleague,    Thank you for referring your patient, Mil Seals, to the [facility] BLOOD AND MARROW TRANSPLANT PROGRAM [place]. Please see a copy of my visit note below.    Infusion Nursing Note:  Mil Seals presents today for 1 liter of IVF and gcsf injection.    Patient seen by provider today: Yes: Louise CHAMBERS   present during visit today: Not Applicable.    Note: Patient here for 1 liter of IVF as she was feeling dizzy per Louise. Patient tolerated infusion well without any issues or side affect. Patient also received gcsf injection in subcutaneous tissue. She tolerated injection well without any issues or side affects.       Intravenous Access:  Olmedo.    Treatment Conditions:  Lab Results   Component Value Date    HGB 8.9 (L) 10/21/2021    WBC 0.2 (LL) 10/21/2021    ANEU 0.4 (LL) 10/20/2021    ANEUTAUTO 8.8 (H) 10/17/2021     (L) 10/21/2021      Lab Results   Component Value Date     10/21/2021    POTASSIUM 3.8 10/21/2021    MAG 2.2 10/21/2021    CR 0.55 10/21/2021    BRUNA 8.2 (L) 10/21/2021    BILITOTAL 0.2 10/21/2021    ALBUMIN 2.9 (L) 10/21/2021    ALT 36 10/21/2021    AST 26 10/21/2021         Post Infusion Assessment:  Patient tolerated infusion without incident.  Patient tolerated injection without incident.  No evidence of extravasations.  Access discontinued per protocol.       Discharge Plan:   Patient discharged in stable condition accompanied by: caregiver.  Departure Mode: Wheelchair.      DUSTIN KRAUSE RN                          Again, thank you for allowing me to participate in the care of your patient.        Sincerely,        [facility]

## 2021-10-22 ENCOUNTER — ONCOLOGY VISIT (OUTPATIENT)
Dept: TRANSPLANT | Facility: CLINIC | Age: 61
End: 2021-10-22
Attending: INTERNAL MEDICINE
Payer: MEDICAID

## 2021-10-22 ENCOUNTER — APPOINTMENT (OUTPATIENT)
Dept: LAB | Facility: CLINIC | Age: 61
End: 2021-10-22
Attending: INTERNAL MEDICINE
Payer: MEDICAID

## 2021-10-22 VITALS
WEIGHT: 165.2 LBS | HEIGHT: 66 IN | TEMPERATURE: 98.6 F | DIASTOLIC BLOOD PRESSURE: 63 MMHG | OXYGEN SATURATION: 97 % | BODY MASS INDEX: 26.55 KG/M2 | HEART RATE: 120 BPM | SYSTOLIC BLOOD PRESSURE: 98 MMHG

## 2021-10-22 DIAGNOSIS — C90.00 MULTIPLE MYELOMA, REMISSION STATUS UNSPECIFIED (H): Primary | ICD-10-CM

## 2021-10-22 DIAGNOSIS — Z52.001 STEM CELL DONOR: ICD-10-CM

## 2021-10-22 DIAGNOSIS — Z94.81 S/P BONE MARROW TRANSPLANT (H): ICD-10-CM

## 2021-10-22 DIAGNOSIS — Z94.81 S/P BONE MARROW TRANSPLANT (H): Primary | ICD-10-CM

## 2021-10-22 LAB
ANION GAP SERPL CALCULATED.3IONS-SCNC: 5 MMOL/L (ref 3–14)
BLD PROD TYP BPU: NORMAL
BLOOD COMPONENT TYPE: NORMAL
BUN SERPL-MCNC: 8 MG/DL (ref 7–30)
CALCIUM SERPL-MCNC: 8.5 MG/DL (ref 8.5–10.1)
CHLORIDE BLD-SCNC: 110 MMOL/L (ref 94–109)
CO2 SERPL-SCNC: 26 MMOL/L (ref 20–32)
CODING SYSTEM: NORMAL
CREAT SERPL-MCNC: 0.48 MG/DL (ref 0.52–1.04)
CULTURE HARVEST COMPLETE DATE: NORMAL
ERYTHROCYTE [DISTWIDTH] IN BLOOD BY AUTOMATED COUNT: 14.6 % (ref 10–15)
GFR SERPL CREATININE-BSD FRML MDRD: >90 ML/MIN/1.73M2
GLUCOSE BLD-MCNC: 137 MG/DL (ref 70–99)
HCT VFR BLD AUTO: 26.9 % (ref 35–47)
HGB BLD-MCNC: 9.1 G/DL (ref 11.7–15.7)
MAGNESIUM SERPL-MCNC: 1.8 MG/DL (ref 1.6–2.3)
MCH RBC QN AUTO: 31 PG (ref 26.5–33)
MCHC RBC AUTO-ENTMCNC: 33.8 G/DL (ref 31.5–36.5)
MCV RBC AUTO: 92 FL (ref 78–100)
PLAT MORPH BLD: NORMAL
PLATELET # BLD AUTO: 66 10E3/UL (ref 150–450)
POTASSIUM BLD-SCNC: 3.7 MMOL/L (ref 3.4–5.3)
RBC # BLD AUTO: 2.94 10E6/UL (ref 3.8–5.2)
RBC MORPH BLD: NORMAL
SODIUM SERPL-SCNC: 141 MMOL/L (ref 133–144)
UNIT ABO/RH: NORMAL
UNIT NUMBER: NORMAL
UNIT STATUS: NORMAL
UNIT TYPE ISBT: 6200
WBC # BLD AUTO: 0.1 10E3/UL (ref 4–11)

## 2021-10-22 PROCEDURE — 83735 ASSAY OF MAGNESIUM: CPT

## 2021-10-22 PROCEDURE — 250N000011 HC RX IP 250 OP 636: Performed by: PHYSICIAN ASSISTANT

## 2021-10-22 PROCEDURE — 80048 BASIC METABOLIC PNL TOTAL CA: CPT

## 2021-10-22 PROCEDURE — 99213 OFFICE O/P EST LOW 20 MIN: CPT

## 2021-10-22 PROCEDURE — G0463 HOSPITAL OUTPT CLINIC VISIT: HCPCS

## 2021-10-22 PROCEDURE — 85027 COMPLETE CBC AUTOMATED: CPT

## 2021-10-22 PROCEDURE — 36592 COLLECT BLOOD FROM PICC: CPT

## 2021-10-22 PROCEDURE — 250N000011 HC RX IP 250 OP 636: Performed by: INTERNAL MEDICINE

## 2021-10-22 PROCEDURE — 258N000003 HC RX IP 258 OP 636: Performed by: PHYSICIAN ASSISTANT

## 2021-10-22 PROCEDURE — 96372 THER/PROPH/DIAG INJ SC/IM: CPT | Performed by: PHYSICIAN ASSISTANT

## 2021-10-22 PROCEDURE — 87040 BLOOD CULTURE FOR BACTERIA: CPT

## 2021-10-22 RX ORDER — HEPARIN SODIUM,PORCINE 10 UNIT/ML
5 VIAL (ML) INTRAVENOUS
Status: CANCELLED | OUTPATIENT
Start: 2021-10-23

## 2021-10-22 RX ORDER — HEPARIN SODIUM (PORCINE) LOCK FLUSH IV SOLN 100 UNIT/ML 100 UNIT/ML
5 SOLUTION INTRAVENOUS
Status: CANCELLED | OUTPATIENT
Start: 2021-10-23

## 2021-10-22 RX ORDER — HEPARIN SODIUM,PORCINE 10 UNIT/ML
5 VIAL (ML) INTRAVENOUS ONCE
Status: COMPLETED | OUTPATIENT
Start: 2021-10-22 | End: 2021-10-22

## 2021-10-22 RX ORDER — HEPARIN SODIUM,PORCINE 10 UNIT/ML
5 VIAL (ML) INTRAVENOUS
Status: CANCELLED | OUTPATIENT
Start: 2021-10-22

## 2021-10-22 RX ORDER — HEPARIN SODIUM (PORCINE) LOCK FLUSH IV SOLN 100 UNIT/ML 100 UNIT/ML
5 SOLUTION INTRAVENOUS
Status: CANCELLED | OUTPATIENT
Start: 2021-10-22

## 2021-10-22 RX ORDER — HEPARIN SODIUM,PORCINE 10 UNIT/ML
5 VIAL (ML) INTRAVENOUS
Status: DISCONTINUED | OUTPATIENT
Start: 2021-10-22 | End: 2021-10-22 | Stop reason: HOSPADM

## 2021-10-22 RX ADMIN — SODIUM CHLORIDE 1000 ML: 9 INJECTION, SOLUTION INTRAVENOUS at 10:08

## 2021-10-22 RX ADMIN — SODIUM CHLORIDE, PRESERVATIVE FREE 5 ML: 5 INJECTION INTRAVENOUS at 10:09

## 2021-10-22 RX ADMIN — SODIUM CHLORIDE, PRESERVATIVE FREE 5 ML: 5 INJECTION INTRAVENOUS at 09:35

## 2021-10-22 RX ADMIN — FILGRASTIM 480 MCG: 480 INJECTION, SOLUTION INTRAVENOUS; SUBCUTANEOUS at 10:12

## 2021-10-22 RX ADMIN — SODIUM CHLORIDE, PRESERVATIVE FREE 5 ML: 5 INJECTION INTRAVENOUS at 11:10

## 2021-10-22 RX ADMIN — SODIUM CHLORIDE, PRESERVATIVE FREE 5 ML: 5 INJECTION INTRAVENOUS at 09:34

## 2021-10-22 ASSESSMENT — MIFFLIN-ST. JEOR: SCORE: 1328.34

## 2021-10-22 NOTE — PROGRESS NOTES
"BMT Clinic Note   10/22/2021    Patient ID:  Mil Seals is a 61 year old woman D+7 s/p auto PBSCT for MM.     Diagnosis MM Multiple myeloma  BMTCT Type Autologous    Prep Regimen Melphalan   Primary BMT MD Dr. Sridhar Cullen   Clinical Trials   [id number]         INTERVAL  HISTORY   Feeling unwell but stable. Still nauseated but this is better if she is able to stay ahead of the nauseated. Taking zofran, ativan, zyprexa, and scop patch. No diarrhea but stomach feels unsettled. Eating is difficult. No throat or mouth pain. Mild bone pain. Dizzy upon leaning forward in wheelchair. No fevers at home. No bleeding or rash    Review of Systems: 8 point ROS negative except as noted above.    PHYSICAL EXAM     KPS:  70    BP 98/63   Pulse 120   Temp 98.6  F (37  C) (Tympanic)   Ht 1.68 m (5' 6.14\")   Wt 74.9 kg (165 lb 3.2 oz)   SpO2 97%   BMI 26.55 kg/m       General: fatigued; NAD  Eyes: sclera anicteric   Nose/Mouth/Throat: OP moist, no ulcerations   Lungs: CTAB  Cardiovascular: Tachycardic but regular. No mrg  Abdominal/Rectal: normoactive bs, soft, not tender, not distended  Lymphatics: no edema  Skin: no rashes or petechiae  Neuro: A&O   Access: CVC     Lab Results   Component Value Date    WBC 0.1 (LL) 10/22/2021    ANEU 0.4 (LL) 10/20/2021    HGB 9.1 (L) 10/22/2021    HCT 26.9 (L) 10/22/2021    PLT 66 (L) 10/22/2021     10/22/2021    POTASSIUM 3.7 10/22/2021    CHLORIDE 110 (H) 10/22/2021    CO2 26 10/22/2021     (H) 10/22/2021    BUN 8 10/22/2021    CR 0.48 (L) 10/22/2021    MAG 1.8 10/22/2021    INR 0.99 10/14/2021    BILITOTAL 0.2 10/21/2021    AST 26 10/21/2021    ALT 36 10/21/2021    ALKPHOS 64 10/21/2021    PROTTOTAL 5.6 (L) 10/21/2021    ALBUMIN 2.9 (L) 10/21/2021       SYSTEMS-BASED ASSESSMENT AND PLAN     Mil Seals is a 60 yo Day +7 s/p auto PBSCT for MM      1. BMT/IEC PROTOCOL  MT 2016-35  Day-1 (10/14/2021) will get Melphalan and flush   Day 0 (10/15/2021) Transplant: " received half of her collected cells:  Total cell dose=10.8 X10^6 CD34/kg  G-CSF daily until WBC recovers.  Taking daily Claritin     2. HEME/COAG  Transfusion parameters: hemoglobin <7, platelets <10     3. ID: low grade fever, no localizing symptoms. Blood cultures 10/22.   Prophylaxis plan: fluconazole, ACV, Levaquin   Begin Bactrim at day +28     4. GI/NUTRITION  Ulcer prophylaxis: protonix  Nausea 2/2 chemo/BMT: Zofran tid, zyprexa 2.5mg bid, with PRN Ativan.  Has Scop patch on, but I cautioned her about continuing that in light of her dizziness;     5.  FEN  Cr and lytes stable . Give 1L NS today.    Electrolyte management: replace per sliding scale     6.   ENDOCRINE  US thyroid: 9/24/2021: Bilateral thyroid nodules. Among these, 1.1 cm left thyroid lobe meets the criteria for biopsy based on FDG avidity on recent PET/CT exam dated 9/21/2021. Per close note: t Since most thyroid cancers that arise in the context of multinodular thyroid are slow growing and this nodule was diagnosed incidentally, will proceed with transplant and biopsy this at count recovery, day 28 auto. Dr Joyner notified [place] by email to get biopsy scheduled then.     7. Neuropathy:  cont gabapentin     8. Mood:  lamictal as mood stabilizer.  prasozin for her PTSD is on hold    9.  CV:  check QTc tomorrow  (on levaquin, zofran, zyprexa).    RTC daily for labs, follow-up, IVF and possible plts.     Discussed need for hospital admission should she spike a fever >100.4 or feel worse at home.     I spent 20 minutes in the care of this patient today, which included time necessary for preparation for the visit, obtaining history, ordering medications/tests/procedures as medically indicated, review of pertinent medical literature, counseling of the patient, communication of recommendations to the care team, and documentation time.    Rai Sibley PA-C  [phone]

## 2021-10-22 NOTE — LETTER
"    10/22/2021         RE: Mil Seals  [address]  [facility] [address]        Dear Colleague,    Thank you for referring your patient, Mil Seals, to the [facility] BLOOD AND MARROW TRANSPLANT PROGRAM [place]. Please see a copy of my visit note below.    BMT Clinic Note   10/22/2021    Patient ID:  Mil Seals is a 61 year old woman D+7 s/p auto PBSCT for MM.     Diagnosis MM Multiple myeloma  BMTCT Type Autologous    Prep Regimen Melphalan   Primary BMT MD Dr. Sridhar Cullen   Clinical Trials   [id number]         INTERVAL  HISTORY   Feeling unwell but stable. Still nauseated but this is better if she is able to stay ahead of the nauseated. Taking zofran, ativan, zyprexa, and scop patch. No diarrhea but stomach feels unsettled. Eating is difficult. No throat or mouth pain. Mild bone pain. Dizzy upon leaning forward in wheelchair. No fevers at home. No bleeding or rash    Review of Systems: 8 point ROS negative except as noted above.    PHYSICAL EXAM     KPS:  70    BP 98/63   Pulse 120   Temp 98.6  F (37  C) (Tympanic)   Ht 1.68 m (5' 6.14\")   Wt 74.9 kg (165 lb 3.2 oz)   SpO2 97%   BMI 26.55 kg/m       General: fatigued; NAD  Eyes: sclera anicteric   Nose/Mouth/Throat: OP moist, no ulcerations   Lungs: CTAB  Cardiovascular: Tachycardic but regular. No mrg  Abdominal/Rectal: normoactive bs, soft, not tender, not distended  Lymphatics: no edema  Skin: no rashes or petechiae  Neuro: A&O   Access: CVC     Lab Results   Component Value Date    WBC 0.1 (LL) 10/22/2021    ANEU 0.4 (LL) 10/20/2021    HGB 9.1 (L) 10/22/2021    HCT 26.9 (L) 10/22/2021    PLT 66 (L) 10/22/2021     10/22/2021    POTASSIUM 3.7 10/22/2021    CHLORIDE 110 (H) 10/22/2021    CO2 26 10/22/2021     (H) 10/22/2021    BUN 8 10/22/2021    CR 0.48 (L) 10/22/2021    MAG 1.8 10/22/2021    INR 0.99 10/14/2021    BILITOTAL 0.2 10/21/2021    AST 26 10/21/2021    ALT 36 10/21/2021    ALKPHOS 64 10/21/2021 "    PROTTOTAL 5.6 (L) 10/21/2021    ALBUMIN 2.9 (L) 10/21/2021       SYSTEMS-BASED ASSESSMENT AND PLAN     Mil Seals is a 60 yo Day +7 s/p auto PBSCT for MM      1. BMT/IEC PROTOCOL  MT 2016-35  Day-1 (10/14/2021) will get Melphalan and flush   Day 0 (10/15/2021) Transplant: received half of her collected cells:  Total cell dose=10.8 X10^6 CD34/kg  G-CSF daily until WBC recovers.  Taking daily Claritin     2. HEME/COAG  Transfusion parameters: hemoglobin <7, platelets <10     3. ID: low grade fever, no localizing symptoms. Blood cultures 10/22.   Prophylaxis plan: fluconazole, ACV, Levaquin   Begin Bactrim at day +28     4. GI/NUTRITION  Ulcer prophylaxis: protonix  Nausea 2/2 chemo/BMT: Zofran tid, zyprexa 2.5mg bid, with PRN Ativan.  Has Scop patch on, but I cautioned her about continuing that in light of her dizziness;     5.  FEN  Cr and lytes stable . Give 1L NS today.    Electrolyte management: replace per sliding scale     6.   ENDOCRINE  US thyroid: 9/24/2021: Bilateral thyroid nodules. Among these, 1.1 cm left thyroid lobe meets the criteria for biopsy based on FDG avidity on recent PET/CT exam dated 9/21/2021. Per close note: t Since most thyroid cancers that arise in the context of multinodular thyroid are slow growing and this nodule was diagnosed incidentally, will proceed with transplant and biopsy this at count recovery, day 28 auto. Dr Joyner notified [place] by email to get biopsy scheduled then.     7. Neuropathy:  cont gabapentin     8. Mood:  lamictal as mood stabilizer.  prasozin for her PTSD is on hold    9.  CV:  check QTc tomorrow  (on levaquin, zofran, zyprexa).    RTC daily for labs, follow-up, IVF and possible plts.     Discussed need for hospital admission should she spike a fever >100.4 or feel worse at home.     I spent 20 minutes in the care of this patient today, which included time necessary for preparation for the visit, obtaining history, ordering  medications/tests/procedures as medically indicated, review of pertinent medical literature, counseling of the patient, communication of recommendations to the care team, and documentation time.    Rai Sibley PA-C  [phone]            Again, thank you for allowing me to participate in the care of your patient.        Sincerely,        BMT Advanced Practice Provider     1

## 2021-10-22 NOTE — NURSING NOTE
Chief Complaint   Patient presents with     Infusion     IV fluids and filgrastim injection. history of multiple myeloma.     Vida Morejon RN

## 2021-10-22 NOTE — NURSING NOTE
"Oncology Rooming Note    October 22, 2021 9:40 AM   Chadsimonalenard Seals is a 61 year old female who presents for:    Chief Complaint   Patient presents with     Blood Draw     Labs drawn via CVC by RN in lab. VS taken     Oncology Clinic Visit     UMP RETURN - MULTIPLE MYELOMA     Initial Vitals: BP 98/63   Pulse 120   Temp 98.6  F (37  C) (Tympanic)   Ht 1.68 m (5' 6.14\")   Wt 74.9 kg (165 lb 3.2 oz)   SpO2 97%   BMI 26.55 kg/m   Estimated body mass index is 26.55 kg/m  as calculated from the following:    Height as of this encounter: 1.68 m (5' 6.14\").    Weight as of this encounter: 74.9 kg (165 lb 3.2 oz). Body surface area is 1.87 meters squared.  Data Unavailable Comment: Data Unavailable   No LMP recorded. Patient is postmenopausal.  Allergies reviewed: Yes  Medications reviewed: Yes    Medications: Medication refills not needed today.  Pharmacy name entered into [facility]: [id number] - [address]    Clinical concerns: No new concerns. Rai was notified.      Korey Maki LPN            "

## 2021-10-22 NOTE — LETTER
10/22/2021         RE: Mil Seals  [address]  [facility] [address]        Dear Colleague,    Thank you for referring your patient, Mil Seals, to the [facility] BLOOD AND MARROW TRANSPLANT PROGRAM [place]. Please see a copy of my visit note below.    Infusion Nursing Note:  Mil Seals presents today for IV fluids and filgrastim injection.    Patient seen by provider today: Yes: Rai Sibley JULEE   present during visit today: Not Applicable.    Note:     Pt received a liter of 0.9NS over an hour.    Blood cultures drawn off each lumen.    Pt received Filgrastim injection by subcutaneous route.      Intravenous Access:  Olmedo.    Treatment Conditions:  Lab Results   Component Value Date    HGB 9.1 (L) 10/22/2021    WBC 0.1 (LL) 10/22/2021    ANEU 0.4 (LL) 10/20/2021    ANEUTAUTO 8.8 (H) 10/17/2021    PLT 66 (L) 10/22/2021      Lab Results   Component Value Date     10/22/2021    POTASSIUM 3.7 10/22/2021    MAG 1.8 10/22/2021    CR 0.48 (L) 10/22/2021    BRUNA 8.5 10/22/2021    BILITOTAL 0.2 10/21/2021    ALBUMIN 2.9 (L) 10/21/2021    ALT 36 10/21/2021    AST 26 10/21/2021         Post Infusion Assessment:  Patient tolerated infusion without incident.  Blood return noted pre and post infusion.  Site patent and intact, free from redness, edema or discomfort.  No evidence of extravasations.  Access discontinued per protocol.       Discharge Plan:   Discharge instructions reviewed with: Patient.  Patient discharged in stable condition accompanied by: self.  Departure Mode: Wheelchair.      Yamini Morejon RN                  ,hi      Again, thank you for allowing me to participate in the care of your patient.        Sincerely,        [facility]

## 2021-10-22 NOTE — PROGRESS NOTES
Infusion Nursing Note:  Mil Seals presents today for IV fluids and filgrastim injection.    Patient seen by provider today: Yes: Rai Sibley JULEE   present during visit today: Not Applicable.    Note:     Pt received a liter of 0.9NS over an hour.    Blood cultures drawn off each lumen.    Pt received Filgrastim injection by subcutaneous route.      Intravenous Access:  Olmedo.    Treatment Conditions:  Lab Results   Component Value Date    HGB 9.1 (L) 10/22/2021    WBC 0.1 (LL) 10/22/2021    ANEU 0.4 (LL) 10/20/2021    ANEUTAUTO 8.8 (H) 10/17/2021    PLT 66 (L) 10/22/2021      Lab Results   Component Value Date     10/22/2021    POTASSIUM 3.7 10/22/2021    MAG 1.8 10/22/2021    CR 0.48 (L) 10/22/2021    BRUNA 8.5 10/22/2021    BILITOTAL 0.2 10/21/2021    ALBUMIN 2.9 (L) 10/21/2021    ALT 36 10/21/2021    AST 26 10/21/2021         Post Infusion Assessment:  Patient tolerated infusion without incident.  Blood return noted pre and post infusion.  Site patent and intact, free from redness, edema or discomfort.  No evidence of extravasations.  Access discontinued per protocol.       Discharge Plan:   Discharge instructions reviewed with: Patient.  Patient discharged in stable condition accompanied by: self.  Departure Mode: Wheelchair.      Yamini Morejon RN                  ,hi

## 2021-10-23 ENCOUNTER — INFUSION THERAPY VISIT (OUTPATIENT)
Dept: TRANSPLANT | Facility: CLINIC | Age: 61
End: 2021-10-23
Attending: INTERNAL MEDICINE
Payer: MEDICAID

## 2021-10-23 ENCOUNTER — APPOINTMENT (OUTPATIENT)
Dept: LAB | Facility: CLINIC | Age: 61
End: 2021-10-23
Attending: INTERNAL MEDICINE
Payer: MEDICAID

## 2021-10-23 VITALS
HEART RATE: 112 BPM | OXYGEN SATURATION: 97 % | DIASTOLIC BLOOD PRESSURE: 75 MMHG | SYSTOLIC BLOOD PRESSURE: 119 MMHG | RESPIRATION RATE: 16 BRPM | BODY MASS INDEX: 26.69 KG/M2 | TEMPERATURE: 98.7 F | WEIGHT: 166.1 LBS

## 2021-10-23 DIAGNOSIS — Z94.81 S/P BONE MARROW TRANSPLANT (H): Primary | ICD-10-CM

## 2021-10-23 DIAGNOSIS — R11.0 NAUSEA: ICD-10-CM

## 2021-10-23 DIAGNOSIS — C90.00 MULTIPLE MYELOMA, REMISSION STATUS UNSPECIFIED (H): ICD-10-CM

## 2021-10-23 DIAGNOSIS — C90.00 MULTIPLE MYELOMA, REMISSION STATUS UNSPECIFIED (H): Primary | ICD-10-CM

## 2021-10-23 DIAGNOSIS — R11.0 NAUSEA: Primary | ICD-10-CM

## 2021-10-23 DIAGNOSIS — Z52.001 STEM CELL DONOR: ICD-10-CM

## 2021-10-23 LAB
ABO/RH(D): ABNORMAL
ALBUMIN SERPL-MCNC: 2.8 G/DL (ref 3.4–5)
ALP SERPL-CCNC: 62 U/L (ref 40–150)
ALT SERPL W P-5'-P-CCNC: 42 U/L (ref 0–50)
ANION GAP SERPL CALCULATED.3IONS-SCNC: 4 MMOL/L (ref 3–14)
ANTIBODY SCREEN, TUBE: NORMAL
ANTIBODY SCREEN: POSITIVE
AST SERPL W P-5'-P-CCNC: 19 U/L (ref 0–45)
BILIRUB DIRECT SERPL-MCNC: <0.1 MG/DL (ref 0–0.2)
BILIRUB SERPL-MCNC: 0.3 MG/DL (ref 0.2–1.3)
BLD PROD TYP BPU: NORMAL
BLD PROD TYP BPU: NORMAL
BLOOD COMPONENT TYPE: NORMAL
BLOOD COMPONENT TYPE: NORMAL
BUN SERPL-MCNC: 6 MG/DL (ref 7–30)
CALCIUM SERPL-MCNC: 8.2 MG/DL (ref 8.5–10.1)
CHLORIDE BLD-SCNC: 109 MMOL/L (ref 94–109)
CO2 SERPL-SCNC: 28 MMOL/L (ref 20–32)
CODING SYSTEM: NORMAL
CODING SYSTEM: NORMAL
CREAT SERPL-MCNC: 0.48 MG/DL (ref 0.52–1.04)
CROSSMATCH: NORMAL
ERYTHROCYTE [DISTWIDTH] IN BLOOD BY AUTOMATED COUNT: 14.3 % (ref 10–15)
GFR SERPL CREATININE-BSD FRML MDRD: >90 ML/MIN/1.73M2
GLUCOSE BLD-MCNC: 115 MG/DL (ref 70–99)
HCT VFR BLD AUTO: 24.1 % (ref 35–47)
HGB BLD-MCNC: 8 G/DL (ref 11.7–15.7)
INTERPRETATION: NORMAL
ISSUE DATE AND TIME: NORMAL
MAGNESIUM SERPL-MCNC: 1.7 MG/DL (ref 1.6–2.3)
MCH RBC QN AUTO: 30.3 PG (ref 26.5–33)
MCHC RBC AUTO-ENTMCNC: 33.2 G/DL (ref 31.5–36.5)
MCV RBC AUTO: 91 FL (ref 78–100)
PLAT MORPH BLD: NORMAL
PLATELET # BLD AUTO: 38 10E3/UL (ref 150–450)
POTASSIUM BLD-SCNC: 3.8 MMOL/L (ref 3.4–5.3)
PROT SERPL-MCNC: 5.5 G/DL (ref 6.8–8.8)
RBC # BLD AUTO: 2.64 10E6/UL (ref 3.8–5.2)
RBC MORPH BLD: NORMAL
SODIUM SERPL-SCNC: 141 MMOL/L (ref 133–144)
SPECIMEN EXPIRATION DATE: ABNORMAL
SPECIMEN EXPIRATION DATE: NORMAL
UNIT ABO/RH: NORMAL
UNIT ABO/RH: NORMAL
UNIT NUMBER: NORMAL
UNIT NUMBER: NORMAL
UNIT STATUS: NORMAL
UNIT STATUS: NORMAL
UNIT TYPE ISBT: 600
UNIT TYPE ISBT: 6200
WBC # BLD AUTO: 0.1 10E3/UL (ref 4–11)

## 2021-10-23 PROCEDURE — 96365 THER/PROPH/DIAG IV INF INIT: CPT

## 2021-10-23 PROCEDURE — 250N000011 HC RX IP 250 OP 636: Performed by: PHYSICIAN ASSISTANT

## 2021-10-23 PROCEDURE — 86850 RBC ANTIBODY SCREEN: CPT | Performed by: INTERNAL MEDICINE

## 2021-10-23 PROCEDURE — 258N000003 HC RX IP 258 OP 636: Performed by: INTERNAL MEDICINE

## 2021-10-23 PROCEDURE — 80053 COMPREHEN METABOLIC PANEL: CPT

## 2021-10-23 PROCEDURE — 36592 COLLECT BLOOD FROM PICC: CPT

## 2021-10-23 PROCEDURE — G0463 HOSPITAL OUTPT CLINIC VISIT: HCPCS

## 2021-10-23 PROCEDURE — 250N000011 HC RX IP 250 OP 636: Performed by: INTERNAL MEDICINE

## 2021-10-23 PROCEDURE — 85041 AUTOMATED RBC COUNT: CPT

## 2021-10-23 PROCEDURE — G0463 HOSPITAL OUTPT CLINIC VISIT: HCPCS | Mod: 25

## 2021-10-23 PROCEDURE — 258N000003 HC RX IP 258 OP 636: Performed by: PHYSICIAN ASSISTANT

## 2021-10-23 PROCEDURE — 86900 BLOOD TYPING SEROLOGIC ABO: CPT | Performed by: INTERNAL MEDICINE

## 2021-10-23 PROCEDURE — 99214 OFFICE O/P EST MOD 30 MIN: CPT

## 2021-10-23 PROCEDURE — 96372 THER/PROPH/DIAG INJ SC/IM: CPT | Mod: XS | Performed by: PHYSICIAN ASSISTANT

## 2021-10-23 PROCEDURE — 250N000012 HC RX MED GY IP 250 OP 636 PS 637: Performed by: INTERNAL MEDICINE

## 2021-10-23 PROCEDURE — 96361 HYDRATE IV INFUSION ADD-ON: CPT

## 2021-10-23 PROCEDURE — 82248 BILIRUBIN DIRECT: CPT

## 2021-10-23 PROCEDURE — 83735 ASSAY OF MAGNESIUM: CPT

## 2021-10-23 RX ORDER — HEPARIN SODIUM (PORCINE) LOCK FLUSH IV SOLN 100 UNIT/ML 100 UNIT/ML
5 SOLUTION INTRAVENOUS
Status: CANCELLED | OUTPATIENT
Start: 2021-10-23

## 2021-10-23 RX ORDER — DEXAMETHASONE 4 MG/1
4 TABLET ORAL ONCE
Status: COMPLETED | OUTPATIENT
Start: 2021-10-23 | End: 2021-10-23

## 2021-10-23 RX ORDER — APREPITANT 80 MG/1
80 CAPSULE ORAL DAILY
Status: DISCONTINUED | OUTPATIENT
Start: 2021-10-23 | End: 2021-10-23

## 2021-10-23 RX ORDER — APREPITANT 80 MG/1
80 CAPSULE ORAL DAILY
Status: CANCELLED
Start: 2021-10-23

## 2021-10-23 RX ORDER — APREPITANT 80 MG/1
80 CAPSULE ORAL DAILY
Status: CANCELLED
Start: 2021-10-24

## 2021-10-23 RX ORDER — HEPARIN SODIUM (PORCINE) LOCK FLUSH IV SOLN 100 UNIT/ML 100 UNIT/ML
5 SOLUTION INTRAVENOUS
Status: CANCELLED | OUTPATIENT
Start: 2021-10-24

## 2021-10-23 RX ORDER — HEPARIN SODIUM,PORCINE 10 UNIT/ML
5 VIAL (ML) INTRAVENOUS
Status: CANCELLED | OUTPATIENT
Start: 2021-10-24

## 2021-10-23 RX ORDER — HEPARIN SODIUM,PORCINE 10 UNIT/ML
5 VIAL (ML) INTRAVENOUS
Status: CANCELLED | OUTPATIENT
Start: 2021-10-23

## 2021-10-23 RX ORDER — DEXAMETHASONE 4 MG/1
4 TABLET ORAL ONCE
Status: CANCELLED
Start: 2021-10-23 | End: 2021-10-23

## 2021-10-23 RX ORDER — DEXAMETHASONE 4 MG/1
4 TABLET ORAL ONCE
Status: CANCELLED
Start: 2021-10-24 | End: 2021-10-24

## 2021-10-23 RX ORDER — HEPARIN SODIUM,PORCINE 10 UNIT/ML
5 VIAL (ML) INTRAVENOUS ONCE
Status: COMPLETED | OUTPATIENT
Start: 2021-10-23 | End: 2021-10-23

## 2021-10-23 RX ADMIN — SODIUM CHLORIDE 150 MG: 9 INJECTION, SOLUTION INTRAVENOUS at 11:33

## 2021-10-23 RX ADMIN — SODIUM CHLORIDE 1000 ML: 9 INJECTION, SOLUTION INTRAVENOUS at 10:41

## 2021-10-23 RX ADMIN — FILGRASTIM 480 MCG: 480 INJECTION, SOLUTION INTRAVENOUS; SUBCUTANEOUS at 11:50

## 2021-10-23 RX ADMIN — Medication 5 ML: at 12:13

## 2021-10-23 RX ADMIN — DEXAMETHASONE 4 MG: 4 TABLET ORAL at 11:32

## 2021-10-23 ASSESSMENT — PAIN SCALES - GENERAL: PAINLEVEL: SEVERE PAIN (7)

## 2021-10-23 NOTE — LETTER
10/23/2021         RE: Mil Seals  [address]  [facility] [address]        Dear Colleague,    Thank you for referring your patient, Mil Seals, to the [facility] BLOOD AND MARROW TRANSPLANT PROGRAM [place]. Please see a copy of my visit note below.    BMT Clinic Note   10/23/2021    Patient ID:  Mil Seals is a 61 year old woman D+7 s/p auto PBSCT for MM.     Diagnosis MM Multiple myeloma  BMTCT Type Autologous    Prep Regimen Melphalan   Primary BMT MD Dr. Sridhar Cullen   Clinical Trials   [id number]         INTERVAL  HISTORY   She returns today now day 8 following high dose melphalan and peripheral blood stem cell transplant.  She continues to have significant nausea and has begun taking Zofran alternating with a milligram of lorazepam through the day.  This is led to significant grogginess, word finding difficulty, and her caregiver is worried about her mental acuity.  She also has had some difficulty sleeping.  She has noticed increased throat and esophageal thickening with postnasal drip and occasional cough.  She said some difficulty swallowing food which is worrisome to her.  She also has noticed bone pain and she was given oxycodone for this which is made her groggy and unsettled without helping her pain.  She is taking Claritin.  Her caregiver is concerned about her medications and today we reviewed the medicines and explained the purpose.  She is having no fevers, chills, lightheadedness, or near syncopal episodes.  She does feel dehydrated today    Review of Systems: 8 point ROS negative except as noted above.    PHYSICAL EXAM     KPS:  70    /75   Pulse 112   Temp 98.7  F (37.1  C) (Oral)   Resp 16   Wt 75.3 kg (166 lb 1.6 oz)   SpO2 97%   BMI 26.69 kg/m       Physical Exam  Vitals reviewed.   Constitutional:       Comments: Tired appearing but conversational and a good historian   HENT:      Nose: Nose normal. No congestion.      Mouth/Throat:       Mouth: Mucous membranes are moist.      Pharynx: No posterior oropharyngeal erythema.   Eyes:      Conjunctiva/sclera: Conjunctivae normal.   Cardiovascular:      Rate and Rhythm: Normal rate and regular rhythm.      Pulses: Normal pulses.      Heart sounds: Normal heart sounds.   Pulmonary:      Effort: Pulmonary effort is normal.      Breath sounds: Normal breath sounds.   Abdominal:      General: Abdomen is flat.   Musculoskeletal:         General: No swelling. Normal range of motion.      Cervical back: Normal range of motion.   Skin:     General: Skin is warm.      Findings: No rash.      Comments: CVAD site is clear   Neurological:      General: No focal deficit present.   Psychiatric:         Mood and Affect: Mood normal.         Behavior: Behavior normal.         Thought Content: Thought content normal.           Lab Results   Component Value Date    WBC 0.1 (LL) 10/22/2021    ANEU 0.4 (LL) 10/20/2021    HGB 9.1 (L) 10/22/2021    HCT 26.9 (L) 10/22/2021    PLT 66 (L) 10/22/2021     10/22/2021    POTASSIUM 3.7 10/22/2021    CHLORIDE 110 (H) 10/22/2021    CO2 26 10/22/2021     (H) 10/22/2021    BUN 8 10/22/2021    CR 0.48 (L) 10/22/2021    MAG 1.8 10/22/2021    INR 0.99 10/14/2021    BILITOTAL 0.2 10/21/2021    AST 26 10/21/2021    ALT 36 10/21/2021    ALKPHOS 64 10/21/2021    PROTTOTAL 5.6 (L) 10/21/2021    ALBUMIN 2.9 (L) 10/21/2021       SYSTEMS-BASED ASSESSMENT AND PLAN       1. BMT/IEC PROTOCOL  MT 2016-35  Day-1 (10/14/2021) will get Melphalan and flush   Day 0 (10/15/2021) Transplant: received half of her collected cells:  Total cell dose=10.8 X10^6 CD34/kg  G-CSF daily until WBC recovers.  Taking daily Claritin  -Today I recommended that we give her a dose of Emend and 4 mg of dexamethasone which will both help with her nausea, decrease her need to take lorazepam, and the dexamethasone will help with her bone pain without the need for oxycodone.  Advised her to avoid both the lorazepam and  the oxycodone except when needed.  We reviewed how best to use the olanzapine and I advised that she take it at night as scheduled but only take it in the morning on an as-needed basis which will decrease the overall sedation of her combine medications.     2. HEME/COAG  Transfusion parameters: hemoglobin <7, platelets <10     3. ID: low grade fever, no localizing symptoms. Blood cultures 10/22.   Prophylaxis plan: fluconazole, ACV, Levaquin   Begin Bactrim at day +28     4. GI/NUTRITION  Ulcer prophylaxis: protonix  Nausea 2/2 chemo/BMT: Zofran tid, zyprexa 2.5mg bid, with PRN Ativan.  Has Scop patch on, but I cautioned her about continuing that in light of her dizziness;     5.  FEN  Cr and lytes stable .  1 L of IV fluid today  Electrolyte management: replace per sliding scale     6.   ENDOCRINE  US thyroid: 9/24/2021: Bilateral thyroid nodules. Among these, 1.1 cm left thyroid lobe meets the criteria for biopsy based on FDG avidity on recent PET/CT exam dated 9/21/2021. Per close note: t Since most thyroid cancers that arise in the context of multinodular thyroid are slow growing and this nodule was diagnosed incidentally, will proceed with transplant and biopsy this at count recovery, day 28 auto. Dr Joyner notified [place] by email to get biopsy scheduled then.     7. Neuropathy:  cont gabapentin.  I discussed interaction of gabapentin with her other medications.     8. Mood:  lamictal as mood stabilizer.  prasozin for her PTSD is on hold    9.  CV:  check QTc tomorrow  (on levaquin, zofran, zyprexa).    RTC daily for labs, follow-up, IVF and possible plts.     Discussed need for hospital admission should she spike a fever >100.4 or feel worse at home.     I spent 20 minutes in the care of this patient today, which included time necessary for preparation for the visit, obtaining history, ordering medications/tests/procedures as medically indicated, review of pertinent medical literature, counseling of the patient,  communication of recommendations to the care team, and documentation time.    OLIVER SOLER MD  October 23, 2021              Again, thank you for allowing me to participate in the care of your patient.        Sincerely,        BMT DOM

## 2021-10-23 NOTE — NURSING NOTE
"Oncology Rooming Note    October 23, 2021 12:46 PM   Mil Seals is a 61 year old female who presents for:    Chief Complaint   Patient presents with     Blood Draw     Labs drawn via CV by RN in lab. VS taken.      RECHECK     provider visit.  hx MM     Initial Vitals: /75   Pulse 112   Temp 98.7  F (37.1  C) (Oral)   Resp 16   Wt 75.3 kg (166 lb 1.6 oz)   SpO2 97%   BMI 26.69 kg/m   Estimated body mass index is 26.69 kg/m  as calculated from the following:    Height as of 10/22/21: 1.68 m (5' 6.14\").    Weight as of this encounter: 75.3 kg (166 lb 1.6 oz). Body surface area is 1.87 meters squared.  Severe Pain (7) Comment: Data Unavailable   No LMP recorded. Patient is postmenopausal.  Allergies reviewed: Yes  Medications reviewed: Yes    Medications: Medication refills not needed today.  Pharmacy name entered into [facility]: [id number] [place] [facility] [address] [place] [address]    Clinical concerns: pt c/o continued nausea and bone pain after g-csf yesterday.  Taking claritin at home with little relief.       Yamini Morejon RN              "

## 2021-10-23 NOTE — NURSING NOTE
Chief Complaint   Patient presents with     Blood Draw     Labs drawn via CV by RN in lab. VS taken.      Labs collected from CVC by RN, line flushed with saline and heparin.  Vitals taken. Pt checked in for appointment(s).      Danika KUMAR RN PHN BSN  BMT/Oncology Lab

## 2021-10-23 NOTE — LETTER
10/23/2021         RE: Mil Seals  [address]  [facility] [address]        Dear Colleague,    Thank you for referring your patient, Mil Seals, to the [facility] BLOOD AND MARROW TRANSPLANT PROGRAM [place]. Please see a copy of my visit note below.    Infusion Nursing Note:  Mil Seals presents today for possible IV fluids, electrolytes.    Patient seen by provider today: Yes: Dr Weinberg   present during visit today: Not Applicable.    Note:   Pt received a liter of 0.9NS over an hour.    Pt received emend 150 mg in 275 mL 0.9NS over 20 minutes for nausea.    Pt received 4 mg decadron for bone pain not relieved with claritin at home.    Pt received filgrastim 480 mcg by subcutaneous route to left lower abdomen.        Intravenous Access:  Olmedo.    Treatment Conditions:  Labs were monitored      Post Infusion Assessment:  Patient tolerated infusion without incident.  Patient tolerated injection without incident.  Blood return noted pre and post infusion.  Site patent and intact, free from redness, edema or discomfort.  No evidence of extravasations.  Access discontinued per protocol.       Discharge Plan:   Discharge instructions reviewed with: Patient.  Patient discharged in stable condition accompanied by: self.  Departure Mode: Wheelchair.      Yamini Morejon RN                          Again, thank you for allowing me to participate in the care of your patient.        Sincerely,        [facility]

## 2021-10-23 NOTE — NURSING NOTE
Chief Complaint   Patient presents with     Infusion     possible IV fluids, electrolytes, blood products.  hx multiple myeloma.     Vida Morejon RN

## 2021-10-23 NOTE — PROGRESS NOTES
BMT Clinic Note   10/23/2021    Patient ID:  Mil Seals is a 61 year old woman D+7 s/p auto PBSCT for MM.     Diagnosis MM Multiple myeloma  BMTCT Type Autologous    Prep Regimen Melphalan   Primary BMT MD Dr. Sridhar Cullen   Clinical Trials   [id number]         INTERVAL  HISTORY   She returns today now day 8 following high dose melphalan and peripheral blood stem cell transplant.  She continues to have significant nausea and has begun taking Zofran alternating with a milligram of lorazepam through the day.  This is led to significant grogginess, word finding difficulty, and her caregiver is worried about her mental acuity.  She also has had some difficulty sleeping.  She has noticed increased throat and esophageal thickening with postnasal drip and occasional cough.  She said some difficulty swallowing food which is worrisome to her.  She also has noticed bone pain and she was given oxycodone for this which is made her groggy and unsettled without helping her pain.  She is taking Claritin.  Her caregiver is concerned about her medications and today we reviewed the medicines and explained the purpose.  She is having no fevers, chills, lightheadedness, or near syncopal episodes.  She does feel dehydrated today    Review of Systems: 8 point ROS negative except as noted above.    PHYSICAL EXAM     KPS:  70    /75   Pulse 112   Temp 98.7  F (37.1  C) (Oral)   Resp 16   Wt 75.3 kg (166 lb 1.6 oz)   SpO2 97%   BMI 26.69 kg/m       Physical Exam  Vitals reviewed.   Constitutional:       Comments: Tired appearing but conversational and a good historian   HENT:      Nose: Nose normal. No congestion.      Mouth/Throat:      Mouth: Mucous membranes are moist.      Pharynx: No posterior oropharyngeal erythema.   Eyes:      Conjunctiva/sclera: Conjunctivae normal.   Cardiovascular:      Rate and Rhythm: Normal rate and regular rhythm.      Pulses: Normal pulses.      Heart sounds: Normal heart sounds.    Pulmonary:      Effort: Pulmonary effort is normal.      Breath sounds: Normal breath sounds.   Abdominal:      General: Abdomen is flat.   Musculoskeletal:         General: No swelling. Normal range of motion.      Cervical back: Normal range of motion.   Skin:     General: Skin is warm.      Findings: No rash.      Comments: CVAD site is clear   Neurological:      General: No focal deficit present.   Psychiatric:         Mood and Affect: Mood normal.         Behavior: Behavior normal.         Thought Content: Thought content normal.           Lab Results   Component Value Date    WBC 0.1 (LL) 10/22/2021    ANEU 0.4 (LL) 10/20/2021    HGB 9.1 (L) 10/22/2021    HCT 26.9 (L) 10/22/2021    PLT 66 (L) 10/22/2021     10/22/2021    POTASSIUM 3.7 10/22/2021    CHLORIDE 110 (H) 10/22/2021    CO2 26 10/22/2021     (H) 10/22/2021    BUN 8 10/22/2021    CR 0.48 (L) 10/22/2021    MAG 1.8 10/22/2021    INR 0.99 10/14/2021    BILITOTAL 0.2 10/21/2021    AST 26 10/21/2021    ALT 36 10/21/2021    ALKPHOS 64 10/21/2021    PROTTOTAL 5.6 (L) 10/21/2021    ALBUMIN 2.9 (L) 10/21/2021       SYSTEMS-BASED ASSESSMENT AND PLAN       1. BMT/IEC PROTOCOL  MT 2016-35  Day-1 (10/14/2021) will get Melphalan and flush   Day 0 (10/15/2021) Transplant: received half of her collected cells:  Total cell dose=10.8 X10^6 CD34/kg  G-CSF daily until WBC recovers.  Taking daily Claritin  -Today I recommended that we give her a dose of Emend and 4 mg of dexamethasone which will both help with her nausea, decrease her need to take lorazepam, and the dexamethasone will help with her bone pain without the need for oxycodone.  Advised her to avoid both the lorazepam and the oxycodone except when needed.  We reviewed how best to use the olanzapine and I advised that she take it at night as scheduled but only take it in the morning on an as-needed basis which will decrease the overall sedation of her combine medications.     2.  HEME/COAG  Transfusion parameters: hemoglobin <7, platelets <10     3. ID: low grade fever, no localizing symptoms. Blood cultures 10/22.   Prophylaxis plan: fluconazole, ACV, Levaquin   Begin Bactrim at day +28     4. GI/NUTRITION  Ulcer prophylaxis: protonix  Nausea 2/2 chemo/BMT: Zofran tid, zyprexa 2.5mg bid, with PRN Ativan.  Has Scop patch on, but I cautioned her about continuing that in light of her dizziness;     5.  FEN  Cr and lytes stable .  1 L of IV fluid today  Electrolyte management: replace per sliding scale     6.   ENDOCRINE  US thyroid: 9/24/2021: Bilateral thyroid nodules. Among these, 1.1 cm left thyroid lobe meets the criteria for biopsy based on FDG avidity on recent PET/CT exam dated 9/21/2021. Per close note: t Since most thyroid cancers that arise in the context of multinodular thyroid are slow growing and this nodule was diagnosed incidentally, will proceed with transplant and biopsy this at count recovery, day 28 auto. Dr Joyner notified [place] by email to get biopsy scheduled then.     7. Neuropathy:  cont gabapentin.  I discussed interaction of gabapentin with her other medications.     8. Mood:  lamictal as mood stabilizer.  prasozin for her PTSD is on hold    9.  CV:  check QTc tomorrow  (on levaquin, zofran, zyprexa).    RTC daily for labs, follow-up, IVF and possible plts.     Discussed need for hospital admission should she spike a fever >100.4 or feel worse at home.     I spent 20 minutes in the care of this patient today, which included time necessary for preparation for the visit, obtaining history, ordering medications/tests/procedures as medically indicated, review of pertinent medical literature, counseling of the patient, communication of recommendations to the care team, and documentation time.    OLIVER SOLER MD  October 23, 2021

## 2021-10-23 NOTE — PROGRESS NOTES
Infusion Nursing Note:  Maria Llenard Arayae Seals presents today for possible IV fluids, electrolytes.    Patient seen by provider today: Yes: Dr Weinberg   present during visit today: Not Applicable.    Note:   Pt received a liter of 0.9NS over an hour.    Pt received emend 150 mg in 275 mL 0.9NS over 20 minutes for nausea.    Pt received 4 mg decadron for bone pain not relieved with claritin at home.    Pt received filgrastim 480 mcg by subcutaneous route to left lower abdomen.        Intravenous Access:  Olmedo.    Treatment Conditions:  Labs were monitored      Post Infusion Assessment:  Patient tolerated infusion without incident.  Patient tolerated injection without incident.  Blood return noted pre and post infusion.  Site patent and intact, free from redness, edema or discomfort.  No evidence of extravasations.  Access discontinued per protocol.       Discharge Plan:   Discharge instructions reviewed with: Patient.  Patient discharged in stable condition accompanied by: self.  Departure Mode: Wheelchair.      Yamini Morejon RN

## 2021-10-24 ENCOUNTER — LAB (OUTPATIENT)
Dept: LAB | Facility: CLINIC | Age: 61
End: 2021-10-24
Attending: INTERNAL MEDICINE
Payer: MEDICAID

## 2021-10-24 ENCOUNTER — INFUSION THERAPY VISIT (OUTPATIENT)
Dept: TRANSPLANT | Facility: CLINIC | Age: 61
End: 2021-10-24
Attending: INTERNAL MEDICINE
Payer: MEDICAID

## 2021-10-24 VITALS
SYSTOLIC BLOOD PRESSURE: 106 MMHG | TEMPERATURE: 98.2 F | HEART RATE: 105 BPM | RESPIRATION RATE: 16 BRPM | DIASTOLIC BLOOD PRESSURE: 65 MMHG | OXYGEN SATURATION: 96 %

## 2021-10-24 DIAGNOSIS — Z52.001 STEM CELL DONOR: ICD-10-CM

## 2021-10-24 DIAGNOSIS — C90.00 MULTIPLE MYELOMA, REMISSION STATUS UNSPECIFIED (H): Primary | ICD-10-CM

## 2021-10-24 DIAGNOSIS — R11.0 NAUSEA: ICD-10-CM

## 2021-10-24 LAB
ANION GAP SERPL CALCULATED.3IONS-SCNC: 6 MMOL/L (ref 3–14)
BUN SERPL-MCNC: 8 MG/DL (ref 7–30)
CALCIUM SERPL-MCNC: 8.3 MG/DL (ref 8.5–10.1)
CHLORIDE BLD-SCNC: 111 MMOL/L (ref 94–109)
CO2 SERPL-SCNC: 27 MMOL/L (ref 20–32)
CREAT SERPL-MCNC: 0.46 MG/DL (ref 0.52–1.04)
ERYTHROCYTE [DISTWIDTH] IN BLOOD BY AUTOMATED COUNT: 14.1 % (ref 10–15)
GFR SERPL CREATININE-BSD FRML MDRD: >90 ML/MIN/1.73M2
GLUCOSE BLD-MCNC: 119 MG/DL (ref 70–99)
HCT VFR BLD AUTO: 22.7 % (ref 35–47)
HGB BLD-MCNC: 7.7 G/DL (ref 11.7–15.7)
MAGNESIUM SERPL-MCNC: 1.7 MG/DL (ref 1.6–2.3)
MCH RBC QN AUTO: 30.3 PG (ref 26.5–33)
MCHC RBC AUTO-ENTMCNC: 33.9 G/DL (ref 31.5–36.5)
MCV RBC AUTO: 89 FL (ref 78–100)
PLAT MORPH BLD: NORMAL
PLATELET # BLD AUTO: 21 10E3/UL (ref 150–450)
POTASSIUM BLD-SCNC: 3.6 MMOL/L (ref 3.4–5.3)
RBC # BLD AUTO: 2.54 10E6/UL (ref 3.8–5.2)
RBC MORPH BLD: NORMAL
SODIUM SERPL-SCNC: 144 MMOL/L (ref 133–144)
WBC # BLD AUTO: 0.1 10E3/UL (ref 4–11)

## 2021-10-24 PROCEDURE — 85027 COMPLETE CBC AUTOMATED: CPT

## 2021-10-24 PROCEDURE — 36592 COLLECT BLOOD FROM PICC: CPT

## 2021-10-24 PROCEDURE — 96372 THER/PROPH/DIAG INJ SC/IM: CPT | Performed by: PHYSICIAN ASSISTANT

## 2021-10-24 PROCEDURE — 80048 BASIC METABOLIC PNL TOTAL CA: CPT

## 2021-10-24 PROCEDURE — 250N000011 HC RX IP 250 OP 636: Performed by: INTERNAL MEDICINE

## 2021-10-24 PROCEDURE — 99214 OFFICE O/P EST MOD 30 MIN: CPT

## 2021-10-24 PROCEDURE — G0463 HOSPITAL OUTPT CLINIC VISIT: HCPCS | Mod: 25

## 2021-10-24 PROCEDURE — 96360 HYDRATION IV INFUSION INIT: CPT

## 2021-10-24 PROCEDURE — 258N000003 HC RX IP 258 OP 636: Performed by: PHYSICIAN ASSISTANT

## 2021-10-24 PROCEDURE — G0463 HOSPITAL OUTPT CLINIC VISIT: HCPCS

## 2021-10-24 PROCEDURE — 83735 ASSAY OF MAGNESIUM: CPT

## 2021-10-24 PROCEDURE — 250N000011 HC RX IP 250 OP 636: Performed by: PHYSICIAN ASSISTANT

## 2021-10-24 RX ORDER — HEPARIN SODIUM,PORCINE 10 UNIT/ML
5 VIAL (ML) INTRAVENOUS ONCE
Status: COMPLETED | OUTPATIENT
Start: 2021-10-24 | End: 2021-10-24

## 2021-10-24 RX ORDER — HEPARIN SODIUM,PORCINE 10 UNIT/ML
5 VIAL (ML) INTRAVENOUS
Status: CANCELLED | OUTPATIENT
Start: 2021-10-25

## 2021-10-24 RX ORDER — HEPARIN SODIUM (PORCINE) LOCK FLUSH IV SOLN 100 UNIT/ML 100 UNIT/ML
5 SOLUTION INTRAVENOUS
Status: CANCELLED | OUTPATIENT
Start: 2021-10-25

## 2021-10-24 RX ADMIN — SODIUM CHLORIDE, PRESERVATIVE FREE 5 ML: 5 INJECTION INTRAVENOUS at 11:34

## 2021-10-24 RX ADMIN — SODIUM CHLORIDE 1000 ML: 9 INJECTION, SOLUTION INTRAVENOUS at 10:18

## 2021-10-24 RX ADMIN — FILGRASTIM 480 MCG: 480 INJECTION, SOLUTION INTRAVENOUS; SUBCUTANEOUS at 11:35

## 2021-10-24 ASSESSMENT — PAIN SCALES - GENERAL: PAINLEVEL: MILD PAIN (2)

## 2021-10-24 NOTE — NURSING NOTE
Chief Complaint   Patient presents with     Blood Draw     Labs drawn via CVC by RN in lab. Vs taken.     Labs collected from CVC by RN, line flushed with saline and heparin.  Vitals taken. Pt checked in for appointment(s).    Danika KUMAR RN PHN BSN  BMT/Oncology Lab

## 2021-10-24 NOTE — NURSING NOTE
Chief Complaint   Patient presents with     Infusion     gcsf, possible IV fluids.  hx multiple myeloma.     Vida Morejon RN

## 2021-10-24 NOTE — PROGRESS NOTES
BMT Clinic Note   10/24/2021    Patient ID:  Mil Seals is a 61 year old woman D+9 s/p auto PBSCT for MM.     Diagnosis MM Multiple myeloma  BMTCT Type Autologous    Prep Regimen Melphalan   Primary BMT MD Dr. Sridhar Cullen   Clinical Trials   [id number]         INTERVAL  HISTORY   She returns today now day 9 and feeling much better. Her nausea is improved, her pain is better but she continues with diarrhea.  She is drinking well and attempting to eat small meals.      Review of Systems: 8 point ROS negative except as noted above.    PHYSICAL EXAM     KPS:  70    /65   Pulse 105   Temp 98.2  F (36.8  C) (Oral)   Resp 16   SpO2 96%      Physical Exam  Vitals reviewed.   Constitutional:       Comments: Tired appearing but conversational and a good historian   HENT:      Nose: Nose normal. No congestion.      Mouth/Throat:      Mouth: Mucous membranes are moist.      Pharynx: No posterior oropharyngeal erythema.   Eyes:      Conjunctiva/sclera: Conjunctivae normal.   Pulmonary:      Effort: Pulmonary effort is normal.   Musculoskeletal:         General: No swelling. Normal range of motion.      Cervical back: Normal range of motion.   Skin:     General: Skin is warm.      Findings: No rash.      Comments: CVAD site is clear   Neurological:      General: No focal deficit present.   Psychiatric:         Mood and Affect: Mood normal.         Behavior: Behavior normal.         Thought Content: Thought content normal.           Lab Results   Component Value Date    WBC 0.1 (LL) 10/24/2021    ANEU 0.4 (LL) 10/20/2021    HGB 7.7 (L) 10/24/2021    HCT 22.7 (L) 10/24/2021    PLT 21 (LL) 10/24/2021     10/24/2021    POTASSIUM 3.6 10/24/2021    CHLORIDE 111 (H) 10/24/2021    CO2 27 10/24/2021     (H) 10/24/2021    BUN 8 10/24/2021    CR 0.46 (L) 10/24/2021    MAG 1.7 10/24/2021    INR 0.99 10/14/2021    BILITOTAL 0.3 10/23/2021    AST 19 10/23/2021    ALT 42 10/23/2021    ALKPHOS 62 10/23/2021     PROTTOTAL 5.5 (L) 10/23/2021    ALBUMIN 2.8 (L) 10/23/2021       SYSTEMS-BASED ASSESSMENT AND PLAN       1. BMT/IEC PROTOCOL  MT 2016-35  Day-1 (10/14/2021) will get Melphalan and flush   Day 0 (10/15/2021) Transplant: received half of her collected cells:  Total cell dose=10.8 X10^6 CD34/kg  G-CSF daily until WBC recovers.  Taking daily Claritin  -Today we will give her IVF     2. HEME/COAG  Transfusion parameters: hemoglobin <7, platelets <10     3. ID: low grade fever, no localizing symptoms. Blood cultures 10/22.   Prophylaxis plan: fluconazole, ACV, Levaquin   Begin Bactrim at day +28     4. GI/NUTRITION  Ulcer prophylaxis: protonix  Nausea 2/2 chemo/BMT: Zofran tid, zyprexa 2.5mg bid, with PRN Ativan.  Has Scop patch on, but I cautioned her about continuing that in light of her dizziness;   -Imodium PRN    5.  FEN  Cr and lytes stable .  1 L of IV fluid today  Electrolyte management: replace per sliding scale     6.   ENDOCRINE  US thyroid: 9/24/2021: Bilateral thyroid nodules. Among these, 1.1 cm left thyroid lobe meets the criteria for biopsy based on FDG avidity on recent PET/CT exam dated 9/21/2021. Per close note: t Since most thyroid cancers that arise in the context of multinodular thyroid are slow growing and this nodule was diagnosed incidentally, will proceed with transplant and biopsy this at count recovery, day 28 auto. Dr Joyner notified [place] by email to get biopsy scheduled then.     7. Neuropathy:  cont gabapentin.  I discussed interaction of gabapentin with her other medications.     8. Mood:  lamictal as mood stabilizer.  prasozin for her PTSD is on hold    9.  CV:  check QTc tomorrow  (on levaquin, zofran, zyprexa).    RTC daily for labs, follow-up, IVF and possible plts.     Discussed need for hospital admission should she spike a fever >100.4 or feel worse at home.     I spent 20 minutes in the care of this patient today, which included time necessary for preparation for the visit, obtaining  history, ordering medications/tests/procedures as medically indicated, review of pertinent medical literature, counseling of the patient, communication of recommendations to the care team, and documentation time.    OLIVER SOLER MD  October 24, 2021

## 2021-10-24 NOTE — LETTER
10/24/2021         RE: Mil Seals  [address]  [facility] [address]        Dear Colleague,    Thank you for referring your patient, Mil Seals, to the [facility] BLOOD AND [facility]ARROW TRANSPLANT PROGRAM [place]. Please see a copy of my visit note below.    Infusion Nursing Note:  Mil Seals presents today for possible IV fluids and g-csf.    Patient seen by provider today: Yes: Dr Weinberg   present during visit today: Not Applicable.    Note:   Pt received a liter of 0.9NS over an hour.    Pt received scheduled filgrastim to subcutaneous tissue in right lower abdomen.    Dressing change done using sterile technique.  See flowsheet.      Intravenous Access:  Olmedo.    Treatment Conditions:  Lab Results   Component Value Date    HGB 7.7 (L) 10/24/2021    WBC 0.1 (LL) 10/24/2021    ANEU 0.4 (LL) 10/20/2021    ANEUTAUTO 8.8 (H) 10/17/2021    PLT 21 (LL) 10/24/2021      Lab Results   Component Value Date     10/24/2021    POTASSIUM 3.6 10/24/2021    MAG 1.7 10/24/2021    CR 0.46 (L) 10/24/2021    BRUNA 8.3 (L) 10/24/2021    BILITOTAL 0.3 10/23/2021    ALBUMIN 2.8 (L) 10/23/2021    ALT 42 10/23/2021    AST 19 10/23/2021         Post Infusion Assessment:  Patient tolerated infusion without incident.  Blood return noted pre and post infusion.  Site patent and intact, free from redness, edema or discomfort.  No evidence of extravasations.  Access discontinued per protocol.       Discharge Plan:   Patient discharged in stable condition accompanied by: family.  Departure Mode: Wheelchair.    Pt didn't have any appointments made for this week-  is making appointments for Monday 10/25 and will call patient with info      Yamini Morejon RN                        Again, thank you for allowing me to participate in the care of your patient.        Sincerely,        [facility]

## 2021-10-24 NOTE — NURSING NOTE
"Oncology Rooming Note    October 24, 2021 11:00 AM   Mil Seals is a 61 year old female who presents for:    Chief Complaint   Patient presents with     Blood Draw     Labs drawn via CVC by RN in lab. Vs taken.     RECHECK     provider visit. hx MM.     Initial Vitals: /65   Pulse 105   Temp 98.2  F (36.8  C) (Oral)   Resp 16   SpO2 96%  Estimated body mass index is 26.69 kg/m  as calculated from the following:    Height as of 10/22/21: 1.68 m (5' 6.14\").    Weight as of 10/23/21: 75.3 kg (166 lb 1.6 oz). There is no height or weight on file to calculate BSA.  Mild Pain (2) Comment: Data Unavailable   No LMP recorded. Patient is postmenopausal.  Allergies reviewed: Yes  Medications reviewed: Yes    Medications: Medication refills not needed today.  Pharmacy name entered into wiseri: [id number] - [place], [place] - [address] [place] [address]    Clinical concerns: reports nausea better controlled today, taking zofran at home       Yamini Morejon RN              "

## 2021-10-24 NOTE — PROGRESS NOTES
Infusion Nursing Note:  Mil Seals presents today for possible IV fluids and g-csf.    Patient seen by provider today: Yes: Dr Weinberg   present during visit today: Not Applicable.    Note:   Pt received a liter of 0.9NS over an hour.    Pt received scheduled filgrastim to subcutaneous tissue in right lower abdomen.    Dressing change done using sterile technique.  See flowsheet.      Intravenous Access:  Olmedo.    Treatment Conditions:  Lab Results   Component Value Date    HGB 7.7 (L) 10/24/2021    WBC 0.1 (LL) 10/24/2021    ANEU 0.4 (LL) 10/20/2021    ANEUTAUTO 8.8 (H) 10/17/2021    PLT 21 (LL) 10/24/2021      Lab Results   Component Value Date     10/24/2021    POTASSIUM 3.6 10/24/2021    MAG 1.7 10/24/2021    CR 0.46 (L) 10/24/2021    BRUNA 8.3 (L) 10/24/2021    BILITOTAL 0.3 10/23/2021    ALBUMIN 2.8 (L) 10/23/2021    ALT 42 10/23/2021    AST 19 10/23/2021         Post Infusion Assessment:  Patient tolerated infusion without incident.  Blood return noted pre and post infusion.  Site patent and intact, free from redness, edema or discomfort.  No evidence of extravasations.  Access discontinued per protocol.       Discharge Plan:   Patient discharged in stable condition accompanied by: family.  Departure Mode: Wheelchair.    Pt didn't have any appointments made for this week-  is making appointments for Monday 10/25 and will call patient with info      Yamini Morejon RN

## 2021-10-24 NOTE — LETTER
10/24/2021         RE: Mil Seals  [address]  [facility] [address]        Dear Colleague,    Thank you for referring your patient, Mil Seals, to the [facility] BLOOD AND MARROW TRANSPLANT PROGRAM [place]. Please see a copy of my visit note below.    BMT Clinic Note   10/24/2021    Patient ID:  Mil Seals is a 61 year old woman D+9 s/p auto PBSCT for MM.     Diagnosis MM Multiple myeloma  BMTCT Type Autologous    Prep Regimen Melphalan   Primary BMT MD Dr. Sridhar Cullen   Clinical Trials   [id number]         INTERVAL  HISTORY   She returns today now day 9 and feeling much better. Her nausea is improved, her pain is better but she continues with diarrhea.  She is drinking well and attempting to eat small meals.      Review of Systems: 8 point ROS negative except as noted above.    PHYSICAL EXAM     KPS:  70    /65   Pulse 105   Temp 98.2  F (36.8  C) (Oral)   Resp 16   SpO2 96%      Physical Exam  Vitals reviewed.   Constitutional:       Comments: Tired appearing but conversational and a good historian   HENT:      Nose: Nose normal. No congestion.      Mouth/Throat:      Mouth: Mucous membranes are moist.      Pharynx: No posterior oropharyngeal erythema.   Eyes:      Conjunctiva/sclera: Conjunctivae normal.   Pulmonary:      Effort: Pulmonary effort is normal.   Musculoskeletal:         General: No swelling. Normal range of motion.      Cervical back: Normal range of motion.   Skin:     General: Skin is warm.      Findings: No rash.      Comments: CVAD site is clear   Neurological:      General: No focal deficit present.   Psychiatric:         Mood and Affect: Mood normal.         Behavior: Behavior normal.         Thought Content: Thought content normal.           Lab Results   Component Value Date    WBC 0.1 (LL) 10/24/2021    ANEU 0.4 (LL) 10/20/2021    HGB 7.7 (L) 10/24/2021    HCT 22.7 (L) 10/24/2021    PLT 21 (LL) 10/24/2021     10/24/2021    POTASSIUM  3.6 10/24/2021    CHLORIDE 111 (H) 10/24/2021    CO2 27 10/24/2021     (H) 10/24/2021    BUN 8 10/24/2021    CR 0.46 (L) 10/24/2021    MAG 1.7 10/24/2021    INR 0.99 10/14/2021    BILITOTAL 0.3 10/23/2021    AST 19 10/23/2021    ALT 42 10/23/2021    ALKPHOS 62 10/23/2021    PROTTOTAL 5.5 (L) 10/23/2021    ALBUMIN 2.8 (L) 10/23/2021       SYSTEMS-BASED ASSESSMENT AND PLAN       1. BMT/IEC PROTOCOL  MT 2016-35  Day-1 (10/14/2021) will get Melphalan and flush   Day 0 (10/15/2021) Transplant: received half of her collected cells:  Total cell dose=10.8 X10^6 CD34/kg  G-CSF daily until WBC recovers.  Taking daily Claritin  -Today we will give her IVF     2. HEME/COAG  Transfusion parameters: hemoglobin <7, platelets <10     3. ID: low grade fever, no localizing symptoms. Blood cultures 10/22.   Prophylaxis plan: fluconazole, ACV, Levaquin   Begin Bactrim at day +28     4. GI/NUTRITION  Ulcer prophylaxis: protonix  Nausea 2/2 chemo/BMT: Zofran tid, zyprexa 2.5mg bid, with PRN Ativan.  Has Scop patch on, but I cautioned her about continuing that in light of her dizziness;   -Imodium PRN    5.  FEN  Cr and lytes stable .  1 L of IV fluid today  Electrolyte management: replace per sliding scale     6.   ENDOCRINE  US thyroid: 9/24/2021: Bilateral thyroid nodules. Among these, 1.1 cm left thyroid lobe meets the criteria for biopsy based on FDG avidity on recent PET/CT exam dated 9/21/2021. Per close note: t Since most thyroid cancers that arise in the context of multinodular thyroid are slow growing and this nodule was diagnosed incidentally, will proceed with transplant and biopsy this at count recovery, day 28 auto. Dr Joyner notified [place] by email to get biopsy scheduled then.     7. Neuropathy:  cont gabapentin.  I discussed interaction of gabapentin with her other medications.     8. Mood:  lamictal as mood stabilizer.  prasozin for her PTSD is on hold    9.  CV:  check QTc tomorrow  (on levaquin, zofran,  zyprexa).    RTC daily for labs, follow-up, IVF and possible plts.     Discussed need for hospital admission should she spike a fever >100.4 or feel worse at home.     I spent 20 minutes in the care of this patient today, which included time necessary for preparation for the visit, obtaining history, ordering medications/tests/procedures as medically indicated, review of pertinent medical literature, counseling of the patient, communication of recommendations to the care team, and documentation time.    OLIVER SOLER MD  October 24, 2021                Again, thank you for allowing me to participate in the care of your patient.        Sincerely,        BMT [facility]

## 2021-10-25 ENCOUNTER — INFUSION THERAPY VISIT (OUTPATIENT)
Dept: TRANSPLANT | Facility: CLINIC | Age: 61
End: 2021-10-25
Attending: INTERNAL MEDICINE
Payer: MEDICAID

## 2021-10-25 ENCOUNTER — APPOINTMENT (OUTPATIENT)
Dept: LAB | Facility: CLINIC | Age: 61
End: 2021-10-25
Attending: INTERNAL MEDICINE
Payer: MEDICAID

## 2021-10-25 VITALS
SYSTOLIC BLOOD PRESSURE: 123 MMHG | TEMPERATURE: 98.5 F | OXYGEN SATURATION: 97 % | DIASTOLIC BLOOD PRESSURE: 72 MMHG | RESPIRATION RATE: 16 BRPM | HEART RATE: 97 BPM

## 2021-10-25 VITALS
TEMPERATURE: 98.6 F | RESPIRATION RATE: 16 BRPM | OXYGEN SATURATION: 98 % | DIASTOLIC BLOOD PRESSURE: 78 MMHG | HEART RATE: 95 BPM | SYSTOLIC BLOOD PRESSURE: 125 MMHG

## 2021-10-25 DIAGNOSIS — R11.0 NAUSEA: ICD-10-CM

## 2021-10-25 DIAGNOSIS — C90.00 MULTIPLE MYELOMA, REMISSION STATUS UNSPECIFIED (H): Primary | ICD-10-CM

## 2021-10-25 DIAGNOSIS — Z52.001 STEM CELL DONOR: ICD-10-CM

## 2021-10-25 DIAGNOSIS — C90.00 MULTIPLE MYELOMA, REMISSION STATUS UNSPECIFIED (H): ICD-10-CM

## 2021-10-25 LAB
BLD PROD TYP BPU: NORMAL
BLD PROD TYP BPU: NORMAL
BLOOD COMPONENT TYPE: NORMAL
BLOOD COMPONENT TYPE: NORMAL
CODING SYSTEM: NORMAL
CODING SYSTEM: NORMAL
CROSSMATCH: NORMAL
UNIT ABO/RH: NORMAL
UNIT ABO/RH: NORMAL
UNIT NUMBER: NORMAL
UNIT NUMBER: NORMAL
UNIT STATUS: NORMAL
UNIT STATUS: NORMAL
UNIT TYPE ISBT: 6200
UNIT TYPE ISBT: 9500

## 2021-10-25 PROCEDURE — 36430 TRANSFUSION BLD/BLD COMPNT: CPT

## 2021-10-25 PROCEDURE — P9037 PLATE PHERES LEUKOREDU IRRAD: HCPCS

## 2021-10-25 PROCEDURE — 99214 OFFICE O/P EST MOD 30 MIN: CPT

## 2021-10-25 PROCEDURE — 93010 ELECTROCARDIOGRAM REPORT: CPT | Performed by: INTERNAL MEDICINE

## 2021-10-25 PROCEDURE — 250N000011 HC RX IP 250 OP 636: Performed by: PHYSICIAN ASSISTANT

## 2021-10-25 PROCEDURE — 96374 THER/PROPH/DIAG INJ IV PUSH: CPT

## 2021-10-25 PROCEDURE — 96372 THER/PROPH/DIAG INJ SC/IM: CPT | Mod: XS | Performed by: PHYSICIAN ASSISTANT

## 2021-10-25 PROCEDURE — P9040 RBC LEUKOREDUCED IRRADIATED: HCPCS

## 2021-10-25 PROCEDURE — 250N000013 HC RX MED GY IP 250 OP 250 PS 637: Performed by: PHYSICIAN ASSISTANT

## 2021-10-25 PROCEDURE — 250N000011 HC RX IP 250 OP 636: Performed by: INTERNAL MEDICINE

## 2021-10-25 RX ORDER — HEPARIN SODIUM (PORCINE) LOCK FLUSH IV SOLN 100 UNIT/ML 100 UNIT/ML
5 SOLUTION INTRAVENOUS
Status: CANCELLED | OUTPATIENT
Start: 2021-10-26

## 2021-10-25 RX ORDER — HEPARIN SODIUM,PORCINE 10 UNIT/ML
5 VIAL (ML) INTRAVENOUS ONCE
Status: COMPLETED | OUTPATIENT
Start: 2021-10-25 | End: 2021-10-25

## 2021-10-25 RX ORDER — HEPARIN SODIUM,PORCINE 10 UNIT/ML
5 VIAL (ML) INTRAVENOUS
Status: CANCELLED | OUTPATIENT
Start: 2021-10-25

## 2021-10-25 RX ORDER — HEPARIN SODIUM (PORCINE) LOCK FLUSH IV SOLN 100 UNIT/ML 100 UNIT/ML
5 SOLUTION INTRAVENOUS
Status: CANCELLED | OUTPATIENT
Start: 2021-10-25

## 2021-10-25 RX ORDER — ONDANSETRON 2 MG/ML
4 INJECTION INTRAMUSCULAR; INTRAVENOUS EVERY 6 HOURS PRN
Status: DISCONTINUED | OUTPATIENT
Start: 2021-10-25 | End: 2021-10-25 | Stop reason: HOSPADM

## 2021-10-25 RX ORDER — ONDANSETRON 8 MG/1
8 TABLET, ORALLY DISINTEGRATING ORAL EVERY 8 HOURS PRN
Qty: 40 TABLET | Refills: 1 | Status: SHIPPED | OUTPATIENT
Start: 2021-10-25 | End: 2021-10-26

## 2021-10-25 RX ORDER — ONDANSETRON 8 MG/1
8 TABLET, ORALLY DISINTEGRATING ORAL EVERY 8 HOURS PRN
Qty: 40 TABLET | Refills: 0 | Status: SHIPPED | OUTPATIENT
Start: 2021-10-25 | End: 2021-10-25

## 2021-10-25 RX ORDER — HEPARIN SODIUM (PORCINE) LOCK FLUSH IV SOLN 100 UNIT/ML 100 UNIT/ML
5 SOLUTION INTRAVENOUS
Status: CANCELLED | OUTPATIENT
Start: 2021-10-28

## 2021-10-25 RX ORDER — DIPHENHYDRAMINE HYDROCHLORIDE AND LIDOCAINE HYDROCHLORIDE AND ALUMINUM HYDROXIDE AND MAGNESIUM HYDRO
5-10 KIT EVERY 6 HOURS PRN
Qty: 119 ML | Refills: 0 | Status: SHIPPED | OUTPATIENT
Start: 2021-10-25 | End: 2021-11-12

## 2021-10-25 RX ORDER — HEPARIN SODIUM,PORCINE 10 UNIT/ML
5 VIAL (ML) INTRAVENOUS
Status: CANCELLED | OUTPATIENT
Start: 2021-10-28

## 2021-10-25 RX ORDER — HEPARIN SODIUM,PORCINE 10 UNIT/ML
5 VIAL (ML) INTRAVENOUS
Status: CANCELLED | OUTPATIENT
Start: 2021-10-26

## 2021-10-25 RX ORDER — ACETAMINOPHEN 325 MG/1
650 TABLET ORAL EVERY 4 HOURS PRN
Status: DISCONTINUED | OUTPATIENT
Start: 2021-10-25 | End: 2021-10-25 | Stop reason: HOSPADM

## 2021-10-25 RX ADMIN — Medication 5 ML: at 10:45

## 2021-10-25 RX ADMIN — ACETAMINOPHEN 650 MG: 325 TABLET ORAL at 13:34

## 2021-10-25 RX ADMIN — FILGRASTIM 480 MCG: 480 INJECTION, SOLUTION INTRAVENOUS; SUBCUTANEOUS at 11:47

## 2021-10-25 RX ADMIN — ONDANSETRON 4 MG: 2 INJECTION INTRAMUSCULAR; INTRAVENOUS at 15:11

## 2021-10-25 ASSESSMENT — PAIN SCALES - GENERAL: PAINLEVEL: MODERATE PAIN (4)

## 2021-10-25 NOTE — LETTER
10/25/2021         RE: Mil Seals  [address]  [facility] [address]        Dear Colleague,    Thank you for referring your patient, Mil Seals, to the [facility] BLOOD AND MARROW TRANSPLANT PROGRAM [place]. Please see a copy of my visit note below.    Infusion Nursing Note:  Mil Seals presents today for add on blood products.    Patient seen by provider today: Yes: Sondra Valerio   present during visit today: Not Applicable.    Note: Lab results monitored; Hgb 7, plts 9, WBC 0.2; No premeds given for blood products. Per patient, took tylenol prior to clinic visit today. Additional 650mg PO Tylenol given for back pain during visit. 480mcg Neupogen given subcutaneously in LLQ of abdomen. 1pk plts given, paused when pt c/o heart palpitations. EKG completed, showed NSR w/ premature supraventricular beats. Ok per JULEE Sondra to restart platelets. VSS throughout. 1unit RBCs given. Pt c/o nausea during transfusion, 4mg IVP zofran given. VSS throughout transfusion.      Intravenous Access:  Olmedo.    Treatment Conditions:  Results reviewed, labs MET treatment parameters, ok to proceed with treatment.      Post Infusion Assessment:  Patient tolerated infusion without incident.       Discharge Plan:   Patient discharged in stable condition accompanied by: friend.      Verónica Hinojosa RN                          Again, thank you for allowing me to participate in the care of your patient.        Sincerely,        [facility]

## 2021-10-25 NOTE — PROGRESS NOTES
BMT Clinic Note   10/25/2021    Patient ID:  Mil Seals is a 61 year old woman D+10 s/p auto PBSCT for MM.     Diagnosis MM Multiple myeloma  BMTCT Type Autologous    Prep Regimen Melphalan   Primary BMT MD Dr. Sridhar Cullen   Clinical Trials   [id number]         INTERVAL  HISTORY   Tired, weak, mouth/throat sore but eating. Tinnitus. Some R ear soreness at times. Eating/drinking okay. No fevers. No bleeding.     Review of Systems: 8 point ROS negative except as noted above.    PHYSICAL EXAM     KPS:  70    /72 (BP Location: Right arm, Patient Position: Sitting, Cuff Size: Adult Regular)   Pulse 97   Temp 98.5  F (36.9  C) (Tympanic)   Resp 16   SpO2 97%      Physical Exam  Vitals reviewed.   Constitutional:       Comments: Tired appearing but conversational and a good historian   HENT:      Nose: Nose normal. No congestion.      Mouth/Throat:      Mouth: Mucous membranes are moist.      Pharynx: No posterior oropharyngeal erythema.   Eyes:      Conjunctiva/sclera: Conjunctivae normal.   Pulmonary:      Effort: Pulmonary effort is normal.   Musculoskeletal:         General: No swelling. Normal range of motion.      Cervical back: Normal range of motion.   Skin:     General: Skin is warm.      Findings: No rash.      Comments: CVAD site is clear   Neurological:      General: No focal deficit present.   Psychiatric:         Mood and Affect: Mood normal.         Behavior: Behavior normal.         Thought Content: Thought content normal.       R TM: pearly grey, good cone of light, no erythema or drainage.     Lab Results   Component Value Date    WBC 0.1 (LL) 10/24/2021    ANEU 0.4 (LL) 10/20/2021    HGB 7.7 (L) 10/24/2021    HCT 22.7 (L) 10/24/2021    PLT 21 (LL) 10/24/2021     10/24/2021    POTASSIUM 3.6 10/24/2021    CHLORIDE 111 (H) 10/24/2021    CO2 27 10/24/2021     (H) 10/24/2021    BUN 8 10/24/2021    CR 0.46 (L) 10/24/2021    MAG 1.7 10/24/2021    INR 0.99 10/14/2021    BILITOTAL  0.3 10/23/2021    AST 19 10/23/2021    ALT 42 10/23/2021    ALKPHOS 62 10/23/2021    PROTTOTAL 5.5 (L) 10/23/2021    ALBUMIN 2.8 (L) 10/23/2021       SYSTEMS-BASED ASSESSMENT AND PLAN       1. BMT/IEC PROTOCOL  MT 2016-35  Day-1 (10/14/2021) will get Melphalan and flush   Day 0 (10/15/2021) Transplant: received half of her collected cells:  Total cell dose=10.8 X10^6 CD34/kg  G-CSF daily until WBC recovers. Taking daily Claritin     2. HEME/COAG  Transfusion parameters: hemoglobin <7, platelets <10  - blood and plts today.      3. ID: low grade fever, no localizing symptoms. Blood cultures 10/22 NGTD.  Prophylaxis plan: fluconazole, ACV, Levaquin   Begin Bactrim at day +28     4. GI/NUTRITION  Ulcer prophylaxis: protonix  Mucositis: she wanted to try MMW. Sent to pharmacy today.   Nausea 2/2 chemo/BMT: Zofran tid, zyprexa 2.5mg bid, with PRN Ativan.   -Imodium PRN    5.  FEN  Cr and lytes stable.   Electrolyte management: replace per sliding scale     6.   ENDOCRINE  US thyroid: 9/24/2021: Bilateral thyroid nodules. Among these, 1.1 cm left thyroid lobe meets the criteria for biopsy based on FDG avidity on recent PET/CT exam dated 9/21/2021. Per close note: t Since most thyroid cancers that arise in the context of multinodular thyroid are slow growing and this nodule was diagnosed incidentally, will proceed with transplant and biopsy this at count recovery, day 28 auto. Dr Joyner notified [place] by email to get biopsy scheduled then.     7. Neuropathy:  cont gabapentin.      8. Mood:  lamictal as mood stabilizer.  prasozin for her PTSD is on hold    9. CV:  - EKG today as RN tells me during plts now feeling palpitations and in prior notes it says check QTc but I don't see that was ever checked as taking multiple prolonging agents.       RTC daily for labs, follow-up      I spent 30 minutes in the care of this patient today, which included time necessary for preparation for the visit, obtaining history, ordering  medications/tests/procedures as medically indicated, review of pertinent medical literature, counseling of the patient, communication of recommendations to the care team, and documentation time.    Sondra Valerio PA-C  [phone]

## 2021-10-25 NOTE — NURSING NOTE
"Oncology Rooming Note    October 25, 2021 11:08 AM   Mil Seals is a 61 year old female who presents for:    Chief Complaint   Patient presents with     Blood Draw     Labs drawn via cvc by RN in lab. VS taken.      RECHECK     Provider visit s/p BMT r/t MM     Initial Vitals: /72 (BP Location: Right arm, Patient Position: Sitting, Cuff Size: Adult Regular)   Pulse 97   Temp 98.5  F (36.9  C) (Tympanic)   Resp 16   SpO2 97%  Estimated body mass index is 26.69 kg/m  as calculated from the following:    Height as of 10/22/21: 1.68 m (5' 6.14\").    Weight as of 10/23/21: 75.3 kg (166 lb 1.6 oz). There is no height or weight on file to calculate BSA.  Moderate Pain (4) Comment: Data Unavailable   No LMP recorded. Patient is postmenopausal.  Allergies reviewed: Yes  Medications reviewed: Yes    Medications: Medication refills not needed today.  Pharmacy name entered into [facility]: [id number] [address]    Clinical concerns: Face, hands, feet feel very swollen; c/o mouth pain - try magic mouthwash? 8/10 int. Pain in ears, currently 4/10 in R ear, occasional tinnitus. Sondra Valerio was notified.      Verónica Hinojosa RN              "

## 2021-10-25 NOTE — PROGRESS NOTES
Infusion Nursing Note:  Mil Seals presents today for add on blood products.    Patient seen by provider today: Yes: Sondra Valerio   present during visit today: Not Applicable.    Note: Lab results monitored; Hgb 7, plts 9, WBC 0.2; No premeds given for blood products. Per patient, took tylenol prior to clinic visit today. Additional 650mg PO Tylenol given for back pain during visit. 480mcg Neupogen given subcutaneously in LLQ of abdomen. 1pk plts given, paused when pt c/o heart palpitations. EKG completed, showed NSR w/ premature supraventricular beats. Ok per JULEE Sondra to restart platelets. VSS throughout. 1unit RBCs given. Pt c/o nausea during transfusion, 4mg IVP zofran given. VSS throughout transfusion.      Intravenous Access:  Olmedo.    Treatment Conditions:  Results reviewed, labs MET treatment parameters, ok to proceed with treatment.      Post Infusion Assessment:  Patient tolerated infusion without incident.       Discharge Plan:   Patient discharged in stable condition accompanied by: friend.      Verónica Hinojosa RN

## 2021-10-25 NOTE — LETTER
10/25/2021         RE: Mil Seals  [address]  [facility] [place] [address]        Dear Colleague,    Thank you for referring your patient, Mil Seals, to the [facility] BLOOD AND MARROW TRANSPLANT PROGRAM [place]. Please see a copy of my visit note below.    BMT Clinic Note   10/25/2021    Patient ID:  Mil Seals is a 61 year old woman D+10 s/p auto PBSCT for MM.     Diagnosis MM Multiple myeloma  BMTCT Type Autologous    Prep Regimen Melphalan   Primary BMT MD Dr. Sridhar Cullen   Clinical Trials   [id number]         INTERVAL  HISTORY   Tired, weak, mouth/throat sore but eating. Tinnitus. Some R ear soreness at times. Eating/drinking okay. No fevers. No bleeding.     Review of Systems: 8 point ROS negative except as noted above.    PHYSICAL EXAM     KPS:  70    /72 (BP Location: Right arm, Patient Position: Sitting, Cuff Size: Adult Regular)   Pulse 97   Temp 98.5  F (36.9  C) (Tympanic)   Resp 16   SpO2 97%      Physical Exam  Vitals reviewed.   Constitutional:       Comments: Tired appearing but conversational and a good historian   HENT:      Nose: Nose normal. No congestion.      Mouth/Throat:      Mouth: Mucous membranes are moist.      Pharynx: No posterior oropharyngeal erythema.   Eyes:      Conjunctiva/sclera: Conjunctivae normal.   Pulmonary:      Effort: Pulmonary effort is normal.   Musculoskeletal:         General: No swelling. Normal range of motion.      Cervical back: Normal range of motion.   Skin:     General: Skin is warm.      Findings: No rash.      Comments: CVAD site is clear   Neurological:      General: No focal deficit present.   Psychiatric:         Mood and Affect: Mood normal.         Behavior: Behavior normal.         Thought Content: Thought content normal.       R TM: pearly grey, good cone of light, no erythema or drainage.     Lab Results   Component Value Date    WBC 0.1 (LL) 10/24/2021    ANEU 0.4 (LL) 10/20/2021    HGB 7.7 (L) 10/24/2021     HCT 22.7 (L) 10/24/2021    PLT 21 (LL) 10/24/2021     10/24/2021    POTASSIUM 3.6 10/24/2021    CHLORIDE 111 (H) 10/24/2021    CO2 27 10/24/2021     (H) 10/24/2021    BUN 8 10/24/2021    CR 0.46 (L) 10/24/2021    MAG 1.7 10/24/2021    INR 0.99 10/14/2021    BILITOTAL 0.3 10/23/2021    AST 19 10/23/2021    ALT 42 10/23/2021    ALKPHOS 62 10/23/2021    PROTTOTAL 5.5 (L) 10/23/2021    ALBUMIN 2.8 (L) 10/23/2021       SYSTEMS-BASED ASSESSMENT AND PLAN       1. BMT/IEC PROTOCOL  MT 2016-35  Day-1 (10/14/2021) will get Melphalan and flush   Day 0 (10/15/2021) Transplant: received half of her collected cells:  Total cell dose=10.8 X10^6 CD34/kg  G-CSF daily until WBC recovers. Taking daily Claritin     2. HEME/COAG  Transfusion parameters: hemoglobin <7, platelets <10  - blood and plts today.      3. ID: low grade fever, no localizing symptoms. Blood cultures 10/22 NGTD.  Prophylaxis plan: fluconazole, ACV, Levaquin   Begin Bactrim at day +28     4. GI/NUTRITION  Ulcer prophylaxis: protonix  Mucositis: she wanted to try MMW. Sent to pharmacy today.   Nausea 2/2 chemo/BMT: Zofran tid, zyprexa 2.5mg bid, with PRN Ativan.   -Imodium PRN    5.  FEN  Cr and lytes stable.   Electrolyte management: replace per sliding scale     6.   ENDOCRINE  US thyroid: 9/24/2021: Bilateral thyroid nodules. Among these, 1.1 cm left thyroid lobe meets the criteria for biopsy based on FDG avidity on recent PET/CT exam dated 9/21/2021. Per close note: t Since most thyroid cancers that arise in the context of multinodular thyroid are slow growing and this nodule was diagnosed incidentally, will proceed with transplant and biopsy this at count recovery, day 28 auto. Dr Joyner notified [place] by email to get biopsy scheduled then.     7. Neuropathy:  cont gabapentin.      8. Mood:  lamictal as mood stabilizer.  prasozin for her PTSD is on hold    9. CV:  - EKG today as RN tells me during plts now feeling palpitations and in prior notes  it says check QTc but I don't see that was ever checked as taking multiple prolonging agents.       RTC daily for labs, follow-up      I spent 30 minutes in the care of this patient today, which included time necessary for preparation for the visit, obtaining history, ordering medications/tests/procedures as medically indicated, review of pertinent medical literature, counseling of the patient, communication of recommendations to the care team, and documentation time.    Sondra Valerio PA-C  [phone]            Again, thank you for allowing me to participate in the care of your patient.        Sincerely,        BMT Advanced Practice Provider

## 2021-10-26 ENCOUNTER — TELEPHONE (OUTPATIENT)
Dept: TRANSPLANT | Facility: CLINIC | Age: 61
End: 2021-10-26

## 2021-10-26 ENCOUNTER — ONCOLOGY VISIT (OUTPATIENT)
Dept: TRANSPLANT | Facility: CLINIC | Age: 61
End: 2021-10-26
Attending: INTERNAL MEDICINE
Payer: MEDICAID

## 2021-10-26 ENCOUNTER — APPOINTMENT (OUTPATIENT)
Dept: LAB | Facility: CLINIC | Age: 61
End: 2021-10-26
Attending: INTERNAL MEDICINE
Payer: MEDICAID

## 2021-10-26 VITALS
SYSTOLIC BLOOD PRESSURE: 129 MMHG | TEMPERATURE: 98.9 F | OXYGEN SATURATION: 97 % | HEART RATE: 101 BPM | DIASTOLIC BLOOD PRESSURE: 81 MMHG | WEIGHT: 167.8 LBS | BODY MASS INDEX: 26.97 KG/M2 | RESPIRATION RATE: 16 BRPM

## 2021-10-26 DIAGNOSIS — Z52.001 STEM CELL DONOR: ICD-10-CM

## 2021-10-26 DIAGNOSIS — R11.0 NAUSEA: ICD-10-CM

## 2021-10-26 DIAGNOSIS — C90.00 MULTIPLE MYELOMA, REMISSION STATUS UNSPECIFIED (H): Primary | ICD-10-CM

## 2021-10-26 DIAGNOSIS — R11.2 REFRACTORY NAUSEA AND VOMITING: ICD-10-CM

## 2021-10-26 DIAGNOSIS — R11.2 REFRACTORY NAUSEA AND VOMITING: Primary | ICD-10-CM

## 2021-10-26 DIAGNOSIS — C90.00 MULTIPLE MYELOMA, REMISSION STATUS UNSPECIFIED (H): ICD-10-CM

## 2021-10-26 LAB
ABO/RH(D): ABNORMAL
ALBUMIN SERPL-MCNC: 2.8 G/DL (ref 3.4–5)
ALP SERPL-CCNC: 66 U/L (ref 40–150)
ALT SERPL W P-5'-P-CCNC: 30 U/L (ref 0–50)
ANION GAP SERPL CALCULATED.3IONS-SCNC: 1 MMOL/L (ref 3–14)
ANTIBODY SCREEN: POSITIVE
AST SERPL W P-5'-P-CCNC: 10 U/L (ref 0–45)
ATRIAL RATE - MUSE: 92 BPM
BASOPHILS # BLD MANUAL: 0 10E3/UL (ref 0–0.2)
BASOPHILS NFR BLD MANUAL: 1 %
BILIRUB DIRECT SERPL-MCNC: <0.1 MG/DL (ref 0–0.2)
BILIRUB SERPL-MCNC: 0.3 MG/DL (ref 0.2–1.3)
BLD PROD TYP BPU: NORMAL
BLD PROD TYP BPU: NORMAL
BLOOD COMPONENT TYPE: NORMAL
BLOOD COMPONENT TYPE: NORMAL
BUN SERPL-MCNC: 9 MG/DL (ref 7–30)
CALCIUM SERPL-MCNC: 8.5 MG/DL (ref 8.5–10.1)
CHLORIDE BLD-SCNC: 108 MMOL/L (ref 94–109)
CO2 SERPL-SCNC: 30 MMOL/L (ref 20–32)
CODING SYSTEM: NORMAL
CODING SYSTEM: NORMAL
CREAT SERPL-MCNC: 0.65 MG/DL (ref 0.52–1.04)
CROSSMATCH: NORMAL
CULTURE HARVEST COMPLETE DATE: NORMAL
CULTURE HARVEST COMPLETE DATE: NORMAL
DIASTOLIC BLOOD PRESSURE - MUSE: NORMAL MMHG
EOSINOPHIL # BLD MANUAL: 0 10E3/UL (ref 0–0.7)
EOSINOPHIL NFR BLD MANUAL: 0 %
ERYTHROCYTE [DISTWIDTH] IN BLOOD BY AUTOMATED COUNT: 14.1 % (ref 10–15)
GFR SERPL CREATININE-BSD FRML MDRD: >90 ML/MIN/1.73M2
GLUCOSE BLD-MCNC: 96 MG/DL (ref 70–99)
HCT VFR BLD AUTO: 26.1 % (ref 35–47)
HGB BLD-MCNC: 9 G/DL (ref 11.7–15.7)
INTERPRETATION ECG - MUSE: NORMAL
INTERPRETATION: NORMAL
ISCN: NORMAL
LYMPHOCYTES # BLD MANUAL: 0.4 10E3/UL (ref 0.8–5.3)
LYMPHOCYTES NFR BLD MANUAL: 30 %
MAGNESIUM SERPL-MCNC: 1.8 MG/DL (ref 1.6–2.3)
MCH RBC QN AUTO: 30.4 PG (ref 26.5–33)
MCHC RBC AUTO-ENTMCNC: 34.5 G/DL (ref 31.5–36.5)
MCV RBC AUTO: 88 FL (ref 78–100)
METAMYELOCYTES # BLD MANUAL: 0 10E3/UL
METAMYELOCYTES NFR BLD MANUAL: 1 %
METHODS: NORMAL
MONOCYTES # BLD MANUAL: 0.2 10E3/UL (ref 0–1.3)
MONOCYTES NFR BLD MANUAL: 17 %
MYELOCYTES # BLD MANUAL: 0 10E3/UL
MYELOCYTES NFR BLD MANUAL: 4 %
NEUTROPHILS # BLD MANUAL: 0.6 10E3/UL (ref 1.6–8.3)
NEUTROPHILS NFR BLD MANUAL: 46 %
NRBC # BLD AUTO: 0 10E3/UL
NRBC BLD MANUAL-RTO: 1 %
P AXIS - MUSE: 82 DEGREES
PLASMA CELLS # BLD MANUAL: 0 10E3/UL
PLASMA CELLS NFR BLD MANUAL: 1 %
PLAT MORPH BLD: ABNORMAL
PLATELET # BLD AUTO: 27 10E3/UL (ref 150–450)
POTASSIUM BLD-SCNC: 3.6 MMOL/L (ref 3.4–5.3)
PR INTERVAL - MUSE: 148 MS
PROT SERPL-MCNC: 5.7 G/DL (ref 6.8–8.8)
QRS DURATION - MUSE: 78 MS
QT - MUSE: 364 MS
QTC - MUSE: 450 MS
R AXIS - MUSE: 78 DEGREES
RBC # BLD AUTO: 2.96 10E6/UL (ref 3.8–5.2)
RBC MORPH BLD: ABNORMAL
SODIUM SERPL-SCNC: 139 MMOL/L (ref 133–144)
SPECIMEN EXPIRATION DATE: ABNORMAL
SYSTOLIC BLOOD PRESSURE - MUSE: NORMAL MMHG
T AXIS - MUSE: 70 DEGREES
UNIT ABO/RH: NORMAL
UNIT ABO/RH: NORMAL
UNIT NUMBER: NORMAL
UNIT NUMBER: NORMAL
UNIT STATUS: NORMAL
UNIT STATUS: NORMAL
UNIT TYPE ISBT: 5100
UNIT TYPE ISBT: 6200
VENTRICULAR RATE- MUSE: 92 BPM
WBC # BLD AUTO: 1.2 10E3/UL (ref 4–11)

## 2021-10-26 PROCEDURE — 258N000003 HC RX IP 258 OP 636: Performed by: PHYSICIAN ASSISTANT

## 2021-10-26 PROCEDURE — 99213 OFFICE O/P EST LOW 20 MIN: CPT

## 2021-10-26 PROCEDURE — 36592 COLLECT BLOOD FROM PICC: CPT

## 2021-10-26 PROCEDURE — 250N000011 HC RX IP 250 OP 636: Performed by: PHYSICIAN ASSISTANT

## 2021-10-26 PROCEDURE — 86900 BLOOD TYPING SEROLOGIC ABO: CPT

## 2021-10-26 PROCEDURE — 96365 THER/PROPH/DIAG IV INF INIT: CPT

## 2021-10-26 PROCEDURE — 250N000011 HC RX IP 250 OP 636: Performed by: INTERNAL MEDICINE

## 2021-10-26 PROCEDURE — 86922 COMPATIBILITY TEST ANTIGLOB: CPT

## 2021-10-26 PROCEDURE — 85027 COMPLETE CBC AUTOMATED: CPT

## 2021-10-26 PROCEDURE — 96375 TX/PRO/DX INJ NEW DRUG ADDON: CPT

## 2021-10-26 PROCEDURE — 82248 BILIRUBIN DIRECT: CPT

## 2021-10-26 PROCEDURE — 80053 COMPREHEN METABOLIC PANEL: CPT

## 2021-10-26 PROCEDURE — 96372 THER/PROPH/DIAG INJ SC/IM: CPT | Performed by: PHYSICIAN ASSISTANT

## 2021-10-26 PROCEDURE — 258N000003 HC RX IP 258 OP 636: Performed by: INTERNAL MEDICINE

## 2021-10-26 PROCEDURE — 83735 ASSAY OF MAGNESIUM: CPT

## 2021-10-26 RX ORDER — HEPARIN SODIUM,PORCINE 10 UNIT/ML
5 VIAL (ML) INTRAVENOUS ONCE
Status: COMPLETED | OUTPATIENT
Start: 2021-10-26 | End: 2021-10-26

## 2021-10-26 RX ORDER — HEPARIN SODIUM,PORCINE 10 UNIT/ML
5 VIAL (ML) INTRAVENOUS
Status: CANCELLED | OUTPATIENT
Start: 2021-10-26

## 2021-10-26 RX ORDER — HEPARIN SODIUM,PORCINE 10 UNIT/ML
5 VIAL (ML) INTRAVENOUS
Status: DISCONTINUED | OUTPATIENT
Start: 2021-10-26 | End: 2021-10-26 | Stop reason: HOSPADM

## 2021-10-26 RX ORDER — HEPARIN SODIUM (PORCINE) LOCK FLUSH IV SOLN 100 UNIT/ML 100 UNIT/ML
5 SOLUTION INTRAVENOUS
Status: CANCELLED | OUTPATIENT
Start: 2021-10-28

## 2021-10-26 RX ORDER — HEPARIN SODIUM,PORCINE 10 UNIT/ML
5 VIAL (ML) INTRAVENOUS
Status: CANCELLED | OUTPATIENT
Start: 2021-10-28

## 2021-10-26 RX ORDER — ONDANSETRON 8 MG/1
8 TABLET, ORALLY DISINTEGRATING ORAL EVERY 8 HOURS PRN
Qty: 40 TABLET | Refills: 1 | Status: SHIPPED | OUTPATIENT
Start: 2021-10-26

## 2021-10-26 RX ORDER — FLUCONAZOLE 200 MG/1
200 TABLET ORAL DAILY
Qty: 30 TABLET | Refills: 0 | Status: SHIPPED | OUTPATIENT
Start: 2021-10-26 | End: 2021-11-12

## 2021-10-26 RX ORDER — HEPARIN SODIUM (PORCINE) LOCK FLUSH IV SOLN 100 UNIT/ML 100 UNIT/ML
5 SOLUTION INTRAVENOUS
Status: CANCELLED | OUTPATIENT
Start: 2021-10-26

## 2021-10-26 RX ORDER — ACYCLOVIR 800 MG/1
800 TABLET ORAL 2 TIMES DAILY
Qty: 60 TABLET | Refills: 0 | Status: SHIPPED | OUTPATIENT
Start: 2021-10-26 | End: 2021-10-27

## 2021-10-26 RX ADMIN — SODIUM CHLORIDE 1000 ML: 9 INJECTION, SOLUTION INTRAVENOUS at 15:46

## 2021-10-26 RX ADMIN — FILGRASTIM 480 MCG: 480 INJECTION, SOLUTION INTRAVENOUS; SUBCUTANEOUS at 15:22

## 2021-10-26 RX ADMIN — SODIUM CHLORIDE, PRESERVATIVE FREE 5 ML: 5 INJECTION INTRAVENOUS at 15:53

## 2021-10-26 RX ADMIN — Medication 5 ML: at 14:06

## 2021-10-26 RX ADMIN — SODIUM CHLORIDE 150 MG: 9 INJECTION, SOLUTION INTRAVENOUS at 15:22

## 2021-10-26 ASSESSMENT — PAIN SCALES - GENERAL: PAINLEVEL: WORST PAIN (10)

## 2021-10-26 NOTE — PROGRESS NOTES
BMT Clinic Note   10/26/2021    Patient ID:  Mil Seals is a 61 year old woman D+11 s/p auto PBSCT for MM.     Diagnosis MM Multiple myeloma  BMTCT Type Autologous    Prep Regimen Melphalan   Primary BMT MD Dr. Sridhar Cullen   Clinical Trials   [id number]       INTERVAL  HISTORY   Teary eyed over bone pain and just generally feeling unwell. Wants emend again as nausea back despite current regimen. Took oxy for bone pain and tylenol but she thinks oxy made the nausea worse. Oxy has helped before for GCSF related bone pain. Still/eating and drinking but would like IVF. No fevers. No bleeding.     Review of Systems: 8 point ROS negative except as noted above.    PHYSICAL EXAM     KPS:  70    /81 (BP Location: Right arm, Patient Position: Sitting, Cuff Size: Adult Regular)   Pulse 101   Temp 98.9  F (37.2  C) (Oral)   Resp 16   Wt 76.1 kg (167 lb 12.8 oz)   SpO2 97%   BMI 26.97 kg/m       General: +teary   ENT: Masked   CV: RRR.  Pulm: BCTA  No edema  CVC: intact    Lab Results   Component Value Date    WBC 0.2 (LL) 10/25/2021    ANEU 0.4 (LL) 10/20/2021    HGB 7.3 (L) 10/25/2021    HCT 21.5 (L) 10/25/2021    PLT 9 (LL) 10/25/2021     10/25/2021    POTASSIUM 3.6 10/25/2021    CHLORIDE 110 (H) 10/25/2021    CO2 28 10/25/2021    GLC 90 10/25/2021    BUN 11 10/25/2021    CR 0.71 10/25/2021    MAG 1.8 10/25/2021    INR 0.99 10/14/2021    BILITOTAL 0.3 10/25/2021    AST 13 10/25/2021    ALT 34 10/25/2021    ALKPHOS 56 10/25/2021    PROTTOTAL 5.2 (L) 10/25/2021    ALBUMIN 2.6 (L) 10/25/2021       SYSTEMS-BASED ASSESSMENT AND PLAN       1. BMT/IEC PROTOCOL  MT 2016-35  Day-1 (10/14/2021) will get Melphalan and flush   Day 0 (10/15/2021) Transplant: received half of her collected cells:  Total cell dose=10.8 X10^6 CD34/kg  G-CSF daily until WBC recovers. Taking daily Claritin, tylenol, oxy. Pending how fast white count recovers would try to stop G early if able based on severity of her bone pain.      2.  HEME/COAG  Transfusion parameters: hemoglobin <7, platelets <10  No transfusions today.     3. ID: low grade fever, no localizing symptoms. Blood cultures 10/22 NGTD.  Prophylaxis plan: fluconazole, ACV, Levaquin   Begin Bactrim at day +28     4. GI/NUTRITION  Ulcer prophylaxis: protonix  Mucositis: she wanted to try MMW.  Nausea 2/2 chemo/BMT: Zofran tid, zyprexa 2.5mg bid, with PRN Ativan. Emend ordered again today. Last given 10/23 and worked very well she tells me.   -Imodium PRN    5.  FEN  Cr and lytes stable.   IVF given.  Electrolyte management: replace per sliding scale     6.   ENDOCRINE  US thyroid: 9/24/2021: Bilateral thyroid nodules. Among these, 1.1 cm left thyroid lobe meets the criteria for biopsy based on FDG avidity on recent PET/CT exam dated 9/21/2021. Per close note: t Since most thyroid cancers that arise in the context of multinodular thyroid are slow growing and this nodule was diagnosed incidentally, will proceed with transplant and biopsy this at count recovery, day 28 auto. Dr Joyner notified [place] by email to get biopsy scheduled then.     7. Neuropathy:  cont gabapentin.      8. Mood:  lamictal as mood stabilizer.  prasozin for her PTSD is on hold    9. CV:  - EKG 10/25 as RN tells me during plts feeling palpitations and in prior notes it says check QTc but I don't see that was ever checked as taking multiple prolonging agents. EKG including QTc okay.       RTC daily for labs, follow-up      I spent 30 minutes in the care of this patient today, which included time necessary for preparation for the visit, obtaining history, ordering medications/tests/procedures as medically indicated, review of pertinent medical literature, counseling of the patient, communication of recommendations to the care team, and documentation time.    Sondra Valerio PA-C  [phone]

## 2021-10-26 NOTE — LETTER
10/26/2021         RE: Mil Seals  [address]  [place] [facility] [address]        Dear Colleague,    Thank you for referring your patient, Mil Seals, to the [facility] BLOOD AND MARROW TRANSPLANT PROGRAM [place]. Please see a copy of my visit note below.    Infusion Nursing Note:  Mil Seals presents today for add-on infusion  Patient seen by provider today: Yes: Sondra   present during visit today: Not Applicable.    Note: Patient received 1 L NS bolus and emend for nausea. Neupogen given per therapy plan.      Intravenous Access:  Olmedo.    Treatment Conditions:  Results reviewed, labs MET treatment parameters, ok to proceed with treatment.      Post Infusion Assessment:  Patient tolerated infusion without incident.       Discharge Plan:   Patient and/or family verbalized understanding of discharge instructions and all questions answered.      Thu Duong RN                          Again, thank you for allowing me to participate in the care of your patient.        Sincerely,        [facility]

## 2021-10-26 NOTE — PROGRESS NOTES
Infusion Nursing Note:  Mil Seals presents today for add-on infusion  Patient seen by provider today: Yes: Sondra   present during visit today: Not Applicable.    Note: Patient received 1 L NS bolus and emend for nausea. Neupogen given per therapy plan.      Intravenous Access:  Olmedo.    Treatment Conditions:  Results reviewed, labs MET treatment parameters, ok to proceed with treatment.      Post Infusion Assessment:  Patient tolerated infusion without incident.       Discharge Plan:   Patient and/or family verbalized understanding of discharge instructions and all questions answered.      Thu Duong RN

## 2021-10-26 NOTE — LETTER
10/26/2021         RE: Mil Seals  [address]  [place] [address]        Dear Colleague,    Thank you for referring your patient, Mil Seals, to the [facility] BLOOD AND MARROW TRANSPLANT PROGRAM [place]. Please see a copy of my visit note below.    BMT Clinic Note   10/26/2021    Patient ID:  Mil Seals is a 61 year old woman D+11 s/p auto PBSCT for MM.     Diagnosis MM Multiple myeloma  BMTCT Type Autologous    Prep Regimen Melphalan   Primary BMT MD Dr. Sridhar Cullen   Clinical Trials   [id number]       INTERVAL  HISTORY   Teary eyed over bone pain and just generally feeling unwell. Wants emend again as nausea back despite current regimen. Took oxy for bone pain and tylenol but she thinks oxy made the nausea worse. Oxy has helped before for GCSF related bone pain. Still/eating and drinking but would like IVF. No fevers. No bleeding.     Review of Systems: 8 point ROS negative except as noted above.    PHYSICAL EXAM     KPS:  70    /81 (BP Location: Right arm, Patient Position: Sitting, Cuff Size: Adult Regular)   Pulse 101   Temp 98.9  F (37.2  C) (Oral)   Resp 16   Wt 76.1 kg (167 lb 12.8 oz)   SpO2 97%   BMI 26.97 kg/m       General: +teary   ENT: Masked   CV: RRR.  Pulm: BCTA  No edema  CVC: intact    Lab Results   Component Value Date    WBC 0.2 (LL) 10/25/2021    ANEU 0.4 (LL) 10/20/2021    HGB 7.3 (L) 10/25/2021    HCT 21.5 (L) 10/25/2021    PLT 9 (LL) 10/25/2021     10/25/2021    POTASSIUM 3.6 10/25/2021    CHLORIDE 110 (H) 10/25/2021    CO2 28 10/25/2021    GLC 90 10/25/2021    BUN 11 10/25/2021    CR 0.71 10/25/2021    MAG 1.8 10/25/2021    INR 0.99 10/14/2021    BILITOTAL 0.3 10/25/2021    AST 13 10/25/2021    ALT 34 10/25/2021    ALKPHOS 56 10/25/2021    PROTTOTAL 5.2 (L) 10/25/2021    ALBUMIN 2.6 (L) 10/25/2021       SYSTEMS-BASED ASSESSMENT AND PLAN       1. BMT/IEC PROTOCOL  MT 2016-35  Day-1 (10/14/2021) will get Melphalan and flush   Day  0 (10/15/2021) Transplant: received half of her collected cells:  Total cell dose=10.8 X10^6 CD34/kg  G-CSF daily until WBC recovers. Taking daily Claritin, tylenol, oxy. Pending how fast white count recovers would try to stop G early if able based on severity of her bone pain.      2. HEME/COAG  Transfusion parameters: hemoglobin <7, platelets <10  No transfusions today.     3. ID: low grade fever, no localizing symptoms. Blood cultures 10/22 NGTD.  Prophylaxis plan: fluconazole, ACV, Levaquin   Begin Bactrim at day +28     4. GI/NUTRITION  Ulcer prophylaxis: protonix  Mucositis: she wanted to try MMW.  Nausea 2/2 chemo/BMT: Zofran tid, zyprexa 2.5mg bid, with PRN Ativan. Emend ordered again today. Last given 10/23 and worked very well she tells me.   -Imodium PRN    5.  FEN  Cr and lytes stable.   IVF given.  Electrolyte management: replace per sliding scale     6.   ENDOCRINE  US thyroid: 9/24/2021: Bilateral thyroid nodules. Among these, 1.1 cm left thyroid lobe meets the criteria for biopsy based on FDG avidity on recent PET/CT exam dated 9/21/2021. Per close note: t Since most thyroid cancers that arise in the context of multinodular thyroid are slow growing and this nodule was diagnosed incidentally, will proceed with transplant and biopsy this at count recovery, day 28 auto. Dr Joyner notified [place] by email to get biopsy scheduled then.     7. Neuropathy:  cont gabapentin.      8. Mood:  lamictal as mood stabilizer.  prasozin for her PTSD is on hold    9. CV:  - EKG 10/25 as RN tells me during plts feeling palpitations and in prior notes it says check QTc but I don't see that was ever checked as taking multiple prolonging agents. EKG including QTc okay.       RTC daily for labs, follow-up      I spent 30 minutes in the care of this patient today, which included time necessary for preparation for the visit, obtaining history, ordering medications/tests/procedures as medically indicated, review of pertinent  medical literature, counseling of the patient, communication of recommendations to the care team, and documentation time.    Sondra Valerio PA-C  [phone]            Again, thank you for allowing me to participate in the care of your patient.        Sincerely,        BMT Advanced Practice Provider

## 2021-10-26 NOTE — TELEPHONE ENCOUNTER
BMT CLINICAL SOCIAL WORK NOTE:    Focus: Resources    Data: Pt is a 61 year old female s/p auto BMT, currently day +11.    Interventions: Clinical  (CSW) placed a call to [facility] [phone] who is th new MTM provider starting 11/1/21. TYRONE spoke with Anna and discussed pt's need for relocation post BMT and asked if [facility] provide relocation benefits. Anna noted that [facility] does offer relocation benefits, but it is based on a case by case and reviewed by the RN. TYRONE requested this process be started and noted that the pt is currently getting this benefit from MTM. Anna shared she sent the request and they will call the pt with the results.    CSW updated the pt regarding this and encouraged her to reach out if she had questions.     Plan: CSW will continue to work with Pt and family to provide supportive counseling and assist with resources as needed. CSW will continue to collaborate with multidisciplinary team regarding Pt's plan of care.     JEMIMA Lyn, [facility]  Pager: [phone]  Phone: [phone]

## 2021-10-27 ENCOUNTER — APPOINTMENT (OUTPATIENT)
Dept: LAB | Facility: CLINIC | Age: 61
End: 2021-10-27
Attending: INTERNAL MEDICINE
Payer: MEDICAID

## 2021-10-27 ENCOUNTER — ONCOLOGY VISIT (OUTPATIENT)
Dept: TRANSPLANT | Facility: CLINIC | Age: 61
End: 2021-10-27
Attending: INTERNAL MEDICINE
Payer: MEDICAID

## 2021-10-27 VITALS
OXYGEN SATURATION: 95 % | DIASTOLIC BLOOD PRESSURE: 87 MMHG | SYSTOLIC BLOOD PRESSURE: 132 MMHG | RESPIRATION RATE: 16 BRPM | HEART RATE: 98 BPM | TEMPERATURE: 99.2 F

## 2021-10-27 DIAGNOSIS — C90.00 MULTIPLE MYELOMA, REMISSION STATUS UNSPECIFIED (H): ICD-10-CM

## 2021-10-27 DIAGNOSIS — M89.8X9 BONE PAIN DUE TO G-CSF: ICD-10-CM

## 2021-10-27 DIAGNOSIS — Z94.81 S/P BONE MARROW TRANSPLANT (H): Primary | ICD-10-CM

## 2021-10-27 DIAGNOSIS — Z91.199 FAILURE TO ATTEND APPOINTMENT: ICD-10-CM

## 2021-10-27 DIAGNOSIS — R11.0 CHEMOTHERAPY-INDUCED NAUSEA: ICD-10-CM

## 2021-10-27 DIAGNOSIS — T45.1X5A CHEMOTHERAPY-INDUCED NAUSEA: ICD-10-CM

## 2021-10-27 DIAGNOSIS — C90.00 MULTIPLE MYELOMA, REMISSION STATUS UNSPECIFIED (H): Primary | ICD-10-CM

## 2021-10-27 LAB
ANION GAP SERPL CALCULATED.3IONS-SCNC: 4 MMOL/L (ref 3–14)
BACTERIA BLD CULT: NO GROWTH
BACTERIA BLD CULT: NO GROWTH
BASOPHILS # BLD MANUAL: 0 10E3/UL (ref 0–0.2)
BASOPHILS NFR BLD MANUAL: 0 %
BLD PROD TYP BPU: NORMAL
BLOOD COMPONENT TYPE: NORMAL
BUN SERPL-MCNC: 6 MG/DL (ref 7–30)
CALCIUM SERPL-MCNC: 8.5 MG/DL (ref 8.5–10.1)
CHLORIDE BLD-SCNC: 108 MMOL/L (ref 94–109)
CO2 SERPL-SCNC: 27 MMOL/L (ref 20–32)
CODING SYSTEM: NORMAL
CREAT SERPL-MCNC: 0.48 MG/DL (ref 0.52–1.04)
CROSSMATCH: NORMAL
EOSINOPHIL # BLD MANUAL: 0 10E3/UL (ref 0–0.7)
EOSINOPHIL NFR BLD MANUAL: 0 %
ERYTHROCYTE [DISTWIDTH] IN BLOOD BY AUTOMATED COUNT: 14.2 % (ref 10–15)
GFR SERPL CREATININE-BSD FRML MDRD: >90 ML/MIN/1.73M2
GLUCOSE BLD-MCNC: 89 MG/DL (ref 70–99)
HCT VFR BLD AUTO: 26.4 % (ref 35–47)
HGB BLD-MCNC: 9.2 G/DL (ref 11.7–15.7)
LYMPHOCYTES # BLD MANUAL: 0.7 10E3/UL (ref 0.8–5.3)
LYMPHOCYTES NFR BLD MANUAL: 15 %
MCH RBC QN AUTO: 31 PG (ref 26.5–33)
MCHC RBC AUTO-ENTMCNC: 34.8 G/DL (ref 31.5–36.5)
MCV RBC AUTO: 89 FL (ref 78–100)
MONOCYTES # BLD MANUAL: 0.6 10E3/UL (ref 0–1.3)
MONOCYTES NFR BLD MANUAL: 12 %
MYELOCYTES # BLD MANUAL: 0.2 10E3/UL
MYELOCYTES NFR BLD MANUAL: 4 %
NEUTROPHILS # BLD MANUAL: 3 10E3/UL (ref 1.6–8.3)
NEUTROPHILS NFR BLD MANUAL: 61 %
NRBC # BLD AUTO: 0 10E3/UL
NRBC BLD MANUAL-RTO: 1 %
PLAT MORPH BLD: ABNORMAL
PLATELET # BLD AUTO: 21 10E3/UL (ref 150–450)
POTASSIUM BLD-SCNC: 3.4 MMOL/L (ref 3.4–5.3)
PROMYELOCYTES # BLD MANUAL: 0.4 10E3/UL
PROMYELOCYTES NFR BLD MANUAL: 8 %
RBC # BLD AUTO: 2.97 10E6/UL (ref 3.8–5.2)
RBC MORPH BLD: ABNORMAL
SODIUM SERPL-SCNC: 139 MMOL/L (ref 133–144)
UNIT ABO/RH: NORMAL
UNIT NUMBER: NORMAL
UNIT STATUS: NORMAL
UNIT TYPE ISBT: 5100
UNIT TYPE ISBT: 6200
UNIT TYPE ISBT: 6200
WBC # BLD AUTO: 4.9 10E3/UL (ref 4–11)

## 2021-10-27 PROCEDURE — 80048 BASIC METABOLIC PNL TOTAL CA: CPT

## 2021-10-27 PROCEDURE — 250N000011 HC RX IP 250 OP 636: Performed by: INTERNAL MEDICINE

## 2021-10-27 PROCEDURE — 36592 COLLECT BLOOD FROM PICC: CPT

## 2021-10-27 PROCEDURE — G0463 HOSPITAL OUTPT CLINIC VISIT: HCPCS

## 2021-10-27 PROCEDURE — 85027 COMPLETE CBC AUTOMATED: CPT

## 2021-10-27 PROCEDURE — 99214 OFFICE O/P EST MOD 30 MIN: CPT

## 2021-10-27 RX ORDER — HEPARIN SODIUM (PORCINE) LOCK FLUSH IV SOLN 100 UNIT/ML 100 UNIT/ML
5 SOLUTION INTRAVENOUS
Status: CANCELLED | OUTPATIENT
Start: 2021-10-27

## 2021-10-27 RX ORDER — HEPARIN SODIUM,PORCINE 10 UNIT/ML
5 VIAL (ML) INTRAVENOUS
Status: DISCONTINUED | OUTPATIENT
Start: 2021-10-27 | End: 2021-10-27 | Stop reason: HOSPADM

## 2021-10-27 RX ORDER — HEPARIN SODIUM,PORCINE 10 UNIT/ML
5 VIAL (ML) INTRAVENOUS
Status: CANCELLED | OUTPATIENT
Start: 2021-10-27

## 2021-10-27 RX ORDER — ACYCLOVIR 800 MG/1
800 TABLET ORAL 2 TIMES DAILY
Qty: 60 TABLET | Refills: 0 | COMMUNITY
Start: 2021-10-27 | End: 2021-10-28

## 2021-10-27 RX ADMIN — Medication 5 ML: at 12:00

## 2021-10-27 ASSESSMENT — PAIN SCALES - GENERAL: PAINLEVEL: WORST PAIN (10)

## 2021-10-27 NOTE — LETTER
10/27/2021         RE: Mil Seals  [address]  [place] [address]        Dear Colleague,    Thank you for referring your patient, Mil Seals, to the [facility] BLOOD AND MARROW TRANSPLANT PROGRAM [place]. Please see a copy of my visit note below.      This encounter was opened in error. Please disregard.      Again, thank you for allowing me to participate in the care of your patient.        Sincerely,        [facility]

## 2021-10-27 NOTE — LETTER
10/27/2021         RE: Mil Seals  [address]  [facility] [address]        Dear Colleague,    Thank you for referring your patient, Mil Seals, to the [facility] BLOOD AND MARROW TRANSPLANT PROGRAM [place]. Please see a copy of my visit note below.    BMT Clinic Note   10/27/2021    Patient ID:  Mil Seals is a 61 year old woman D+12 s/p auto PBSCT for MM.     Diagnosis MM Multiple myeloma  BMTCT Type Autologous    Prep Regimen Melphalan   Primary BMT MD Dr. Sridhar Cullen   Clinical Trials   [id number]       INTERVAL  HISTORY   Feels terrible today which she attributes to gcsf related bone pain and acyclovir. Her bone pain is excruciating and makes her cry despite pain relieving medications. Acyclovir makes her feel nauseated, dizzy, agitated, and even endorses some thoughts of self harm which is not her baseline. She knows this is related to her Acyclovir because she tried going off it for 1-2 days and felt better. She took it again for the first time this morning and the symptoms returned. Nausea was better after emend until she took the Acyclovir. Stools are soft and semi-formed.    Review of Systems: 8 point ROS negative except as noted above.    PHYSICAL EXAM     KPS:  70    /87 (BP Location: Right arm, Patient Position: Sitting, Cuff Size: Adult Regular)   Pulse 98   Temp 99.2  F (37.3  C) (Tympanic)   Resp 16   SpO2 95%      General: +teary, fatigued,    ENT: oral mucosa moist and without ulcerations  CV: RRR. No m/r/g  Pulm: BCTA without crackles or wheezes  Abd: Nt, nd, bowel sounds present  No edema  CVC: intact    Lab Results   Component Value Date    WBC 1.2 (L) 10/26/2021    ANEU 0.6 (L) 10/26/2021    HGB 9.0 (L) 10/26/2021    HCT 26.1 (L) 10/26/2021    PLT 27 (LL) 10/26/2021     10/26/2021    POTASSIUM 3.6 10/26/2021    CHLORIDE 108 10/26/2021    CO2 30 10/26/2021    GLC 96 10/26/2021    BUN 9 10/26/2021    CR 0.65 10/26/2021    MAG 1.8 10/26/2021     INR 0.99 10/14/2021    BILITOTAL 0.3 10/26/2021    AST 10 10/26/2021    ALT 30 10/26/2021    ALKPHOS 66 10/26/2021    PROTTOTAL 5.7 (L) 10/26/2021    ALBUMIN 2.8 (L) 10/26/2021       SYSTEMS-BASED ASSESSMENT AND PLAN       1. BMT/IEC PROTOCOL  MT 2016-35  Day-1 (10/14/2021) will get Melphalan and flush   Day 0 (10/15/2021) Transplant: received half of her collected cells:  Total cell dose=10.8 X10^6 CD34/kg  G-CSF daily until WBC recovers. Taking daily Claritin, tylenol, oxy but bone pain is still excruciating. ANC 3.0 today, will skip last dose of gcsf and dose prn.      2. HEME/COAG  Transfusion parameters: hemoglobin <7, platelets <10  No transfusions today.     3. ID: low grade fever, no localizing symptoms. Blood cultures 10/22 NGTD.  Prophylaxis plan: fluconazole, Levaquin. Okay to stop levaquin.   ACV BID: pt endorses sx of dizziness, nausea, agitation and self harm with this medication. Stop today. If feeling better tomorrow consider trial of valtrex for viral prophy.    Begin Bactrim at day +28     4. GI/NUTRITION  Ulcer prophylaxis: protonix  Mucositis: she wanted to try MMW.  Nausea 2/2 chemo/BMT: Zofran tid, zyprexa 2.5mg bid, with PRN Ativan. Emend 10/23 and 10/26.  -Imodium PRN    5.  FEN  Cr and lytes stable.   Electrolyte management: replace per sliding scale     6.   ENDOCRINE  US thyroid: 9/24/2021: Bilateral thyroid nodules. Among these, 1.1 cm left thyroid lobe meets the criteria for biopsy based on FDG avidity on recent PET/CT exam dated 9/21/2021. Per close note: t Since most thyroid cancers that arise in the context of multinodular thyroid are slow growing and this nodule was diagnosed incidentally, will proceed with transplant and biopsy this at count recovery, day 28 auto. Dr Joyner notified [place] by email to get biopsy scheduled then.     7. Neuropathy:  cont gabapentin.      8. Mood:  lamictal as mood stabilizer.  prasozin for her PTSD is on hold   Discussed thoughts of self harm 10/27.  Pt endorses that this is very abnormal for her and feels strongly it is related to acyclovir. Will contact us if symptoms recur despite stopping ACV.     9. CV:  - EKG 10/25 d/t palpitations. NSR with PVCs. QTc okay.       RTC daily for labs, follow-up. If feeling better can likely have time off over the weekend.       I spent 30 minutes in the care of this patient today, which included time necessary for preparation for the visit, obtaining history, ordering medications/tests/procedures as medically indicated, review of pertinent medical literature, counseling of the patient, communication of recommendations to the care team, and documentation time.    Rai Sibley PA-c  [phone]            Again, thank you for allowing me to participate in the care of your patient.        Sincerely,        BMT Advanced Practice Provider

## 2021-10-27 NOTE — PROGRESS NOTES
BMT Clinic Note   10/27/2021    Patient ID:  Mil Seals is a 61 year old woman D+12 s/p auto PBSCT for MM.     Diagnosis MM Multiple myeloma  BMTCT Type Autologous    Prep Regimen Melphalan   Primary BMT MD Dr. Sridhar Cullen   Clinical Trials   [id number]       INTERVAL  HISTORY   Feels terrible today which she attributes to gcsf related bone pain and acyclovir. Her bone pain is excruciating and makes her cry despite pain relieving medications. Acyclovir makes her feel nauseated, dizzy, agitated, and even endorses some thoughts of self harm which is not her baseline. She knows this is related to her Acyclovir because she tried going off it for 1-2 days and felt better. She took it again for the first time this morning and the symptoms returned. Nausea was better after emend until she took the Acyclovir. Stools are soft and semi-formed.    Review of Systems: 8 point ROS negative except as noted above.    PHYSICAL EXAM     KPS:  70    /87 (BP Location: Right arm, Patient Position: Sitting, Cuff Size: Adult Regular)   Pulse 98   Temp 99.2  F (37.3  C) (Tympanic)   Resp 16   SpO2 95%      General: +teary, fatigued,    ENT: oral mucosa moist and without ulcerations  CV: RRR. No m/r/g  Pulm: BCTA without crackles or wheezes  Abd: Nt, nd, bowel sounds present  No edema  CVC: intact    Lab Results   Component Value Date    WBC 1.2 (L) 10/26/2021    ANEU 0.6 (L) 10/26/2021    HGB 9.0 (L) 10/26/2021    HCT 26.1 (L) 10/26/2021    PLT 27 (LL) 10/26/2021     10/26/2021    POTASSIUM 3.6 10/26/2021    CHLORIDE 108 10/26/2021    CO2 30 10/26/2021    GLC 96 10/26/2021    BUN 9 10/26/2021    CR 0.65 10/26/2021    MAG 1.8 10/26/2021    INR 0.99 10/14/2021    BILITOTAL 0.3 10/26/2021    AST 10 10/26/2021    ALT 30 10/26/2021    ALKPHOS 66 10/26/2021    PROTTOTAL 5.7 (L) 10/26/2021    ALBUMIN 2.8 (L) 10/26/2021       SYSTEMS-BASED ASSESSMENT AND PLAN       1. BMT/IEC PROTOCOL  MT 2016-35  Day-1 (10/14/2021) will  get Melphalan and flush   Day 0 (10/15/2021) Transplant: received half of her collected cells:  Total cell dose=10.8 X10^6 CD34/kg  G-CSF daily until WBC recovers. Taking daily Claritin, tylenol, oxy but bone pain is still excruciating. ANC 3.0 today, will skip last dose of gcsf and dose prn.      2. HEME/COAG  Transfusion parameters: hemoglobin <7, platelets <10  No transfusions today.     3. ID: low grade fever, no localizing symptoms. Blood cultures 10/22 NGTD.  Prophylaxis plan: fluconazole, Levaquin. Okay to stop levaquin.   ACV BID: pt endorses sx of dizziness, nausea, agitation and self harm with this medication. Stop today. If feeling better tomorrow consider trial of valtrex for viral prophy.    Begin Bactrim at day +28     4. GI/NUTRITION  Ulcer prophylaxis: protonix  Mucositis: she wanted to try MMW.  Nausea 2/2 chemo/BMT: Zofran tid, zyprexa 2.5mg bid, with PRN Ativan. Emend 10/23 and 10/26.  -Imodium PRN    5.  FEN  Cr and lytes stable.   Electrolyte management: replace per sliding scale     6.   ENDOCRINE  US thyroid: 9/24/2021: Bilateral thyroid nodules. Among these, 1.1 cm left thyroid lobe meets the criteria for biopsy based on FDG avidity on recent PET/CT exam dated 9/21/2021. Per close note: t Since most thyroid cancers that arise in the context of multinodular thyroid are slow growing and this nodule was diagnosed incidentally, will proceed with transplant and biopsy this at count recovery, day 28 auto. Dr Joyner notified [place] by email to get biopsy scheduled then.     7. Neuropathy:  cont gabapentin.      8. Mood:  lamictal as mood stabilizer.  prasozin for her PTSD is on hold   Discussed thoughts of self harm 10/27. Pt endorses that this is very abnormal for her and feels strongly it is related to acyclovir. Will contact us if symptoms recur despite stopping ACV.     9. CV:  - EKG 10/25 d/t palpitations. NSR with PVCs. QTc okay.       RTC daily for labs, follow-up. If feeling better can likely  have time off over the weekend.       I spent 30 minutes in the care of this patient today, which included time necessary for preparation for the visit, obtaining history, ordering medications/tests/procedures as medically indicated, review of pertinent medical literature, counseling of the patient, communication of recommendations to the care team, and documentation time.    Rai Sibley PA-c  [phone]

## 2021-10-27 NOTE — NURSING NOTE
Received call from blood bank at hospital- positive antibody screen.  Rai JULEE notified.  Vida Morejon RN

## 2021-10-27 NOTE — PROGRESS NOTES
BMT Clinic Note   10/28/2021    Patient ID:  Mil Seals is a 61 year old woman D+13 s/p auto PBSCT for MM.     Diagnosis MM Multiple myeloma  BMTCT Type Autologous    Prep Regimen Melphalan   Primary BMT MD Dr. Sridhar Cullen   Clinical Trials   [id number]       INTERVAL  HISTORY   Mil is feeling better.  Attributes this to stopping GCSF & Acyclovir.  Ate dinner last night with minimal nausea, no emesis.  Afebrile with no cough or SOB.  Denies pain.  Feels she is having some palpitations.  Denies dizziness.  No bleeding or rash.      Review of Systems: 8 point ROS negative except as noted above.    PHYSICAL EXAM     KPS:  70    /70   Pulse 108   Temp 98  F (36.7  C) (Oral)   Resp 14   Wt 73.3 kg (161 lb 11.2 oz)   SpO2 95%   BMI 25.99 kg/m       General: NAD  ENT: oral mucosa moist and without ulcerations  CV: RRR. No m/r/g  Pulm: BCTA  Abd: Nt, nd, bowel sounds present  No edema  CVC: intact, no erythema or edema    Lab Results   Component Value Date    WBC 9.4 10/28/2021    ANEU 3.0 10/27/2021    HGB 9.9 (L) 10/28/2021    HCT 28.7 (L) 10/28/2021    PLT 33 (LL) 10/28/2021     10/27/2021    POTASSIUM 3.4 10/27/2021    CHLORIDE 108 10/27/2021    CO2 27 10/27/2021    GLC 89 10/27/2021    BUN 6 (L) 10/27/2021    CR 0.48 (L) 10/27/2021    MAG 1.8 10/26/2021    INR 0.99 10/14/2021    BILITOTAL 0.3 10/26/2021    AST 10 10/26/2021    ALT 30 10/26/2021    ALKPHOS 66 10/26/2021    PROTTOTAL 5.7 (L) 10/26/2021    ALBUMIN 2.8 (L) 10/26/2021       SYSTEMS-BASED ASSESSMENT AND PLAN       1. BMT/IEC PROTOCOL  MT 2016-35  Day-1 (10/14/2021) will get Melphalan and flush   Day 0 (10/15/2021) Transplant: received half of her collected cells:  Total cell dose=10.8 X10^6 CD34/kg  G-CSF completed     2. HEME/COAG  Transfusion parameters: hemoglobin <7, platelets <10  No transfusions today.     3. ID:  Afebrile.  - Prophylaxis fluconazole, Levaquin discontinued 10/27   - ACV held due to dizziness, nausea,  agitation and thoughts of self harm with this medication. Feeling better since being off it.  Will try Valtrex 500mg BID  - Begin Bactrim at day +28     4. GI/NUTRITION  - Ulcer prophylaxis: protonix  - Nausea 2/2 chemo/BMT: Zofran tid, zyprexa 2.5mg bid, with PRN Ativan. Emend 10/23 and 10/26.    5.  FEN  - Cr and lytes stable.   - Replace K IV today     6.   ENDOCRINE  US thyroid: 9/24/2021: Bilateral thyroid nodules. Among these, 1.1 cm left thyroid lobe meets the criteria for biopsy based on FDG avidity on recent PET/CT exam dated 9/21/2021. Per close note: t Since most thyroid cancers that arise in the context of multinodular thyroid are slow growing and this nodule was diagnosed incidentally, will proceed with transplant and biopsy this at count recovery, day 28 auto. Dr Joyner notified [place] by email to get biopsy scheduled then.     7. Neuropathy:  cont gabapentin.      8. Mood:  lamictal as mood stabilizer.  prasozin for her PTSD is on hold    9. CV:  EKG 10/25 d/t palpitations. NSR with PVCs. QTc okay. NSR on exam      RTC Sunday      I spent 30 minutes in the care of this patient today, which included time necessary for preparation for the visit, obtaining history, ordering medications/tests/procedures as medically indicated, review of pertinent medical literature, counseling of the patient, communication of recommendations to the care team, and documentation time.    Louise Russo

## 2021-10-27 NOTE — NURSING NOTE
"Oncology Rooming Note    October 27, 2021 12:21 PM   Mil Seals is a 61 year old female who presents for:    Chief Complaint   Patient presents with     Oncology Clinic Visit     Multiple myeloma      Blood Draw     labs drawn from [facility] by rn.  vs taken     Initial Vitals: /87 (BP Location: Right arm, Patient Position: Sitting, Cuff Size: Adult Regular)   Pulse 98   Temp 99.2  F (37.3  C) (Tympanic)   Resp 16   SpO2 95%  Estimated body mass index is 26.97 kg/m  as calculated from the following:    Height as of 10/22/21: 1.68 m (5' 6.14\").    Weight as of 10/26/21: 76.1 kg (167 lb 12.8 oz). There is no height or weight on file to calculate BSA.  Worst Pain (10) Comment: Data Unavailable   No LMP recorded. Patient is postmenopausal.  Allergies reviewed: Yes  Medications reviewed: Yes    Medications: Medication refills not needed today.  Pharmacy name entered into [employer]: [id number] - [place], [place] - [address] [place] [address]    Clinical concerns: Would like to discuss discontinuing the acyclovir. Makes patient super nauseous and sick  Bone pain has been increasing to a 10-12 pain level.       Beryl Mccarty LPN            "

## 2021-10-27 NOTE — NURSING NOTE
Chief Complaint   Patient presents with     Oncology Clinic Visit     Multiple myeloma      Blood Draw     labs drawn from cvc by rn.  vs taken     Labs drawn from CVC by rn.  Good blood return noted in both lumens.  Both lumens flushed with NS and heparin.  Pt tolerated well.  VS taken.  Pt checked in for next appt.  Cathleen Fournier RN

## 2021-10-28 ENCOUNTER — ONCOLOGY VISIT (OUTPATIENT)
Dept: TRANSPLANT | Facility: CLINIC | Age: 61
End: 2021-10-28
Attending: INTERNAL MEDICINE
Payer: MEDICAID

## 2021-10-28 ENCOUNTER — APPOINTMENT (OUTPATIENT)
Dept: LAB | Facility: CLINIC | Age: 61
End: 2021-10-28
Attending: INTERNAL MEDICINE
Payer: MEDICAID

## 2021-10-28 VITALS
BODY MASS INDEX: 25.99 KG/M2 | WEIGHT: 161.7 LBS | DIASTOLIC BLOOD PRESSURE: 70 MMHG | RESPIRATION RATE: 14 BRPM | OXYGEN SATURATION: 95 % | HEART RATE: 108 BPM | SYSTOLIC BLOOD PRESSURE: 117 MMHG | TEMPERATURE: 98 F

## 2021-10-28 DIAGNOSIS — R11.2 REFRACTORY NAUSEA AND VOMITING: ICD-10-CM

## 2021-10-28 DIAGNOSIS — C90.00 MULTIPLE MYELOMA, REMISSION STATUS UNSPECIFIED (H): Primary | ICD-10-CM

## 2021-10-28 DIAGNOSIS — C90.00 MULTIPLE MYELOMA, REMISSION STATUS UNSPECIFIED (H): ICD-10-CM

## 2021-10-28 DIAGNOSIS — Z94.81 S/P BONE MARROW TRANSPLANT (H): Primary | ICD-10-CM

## 2021-10-28 DIAGNOSIS — R11.0 NAUSEA: ICD-10-CM

## 2021-10-28 DIAGNOSIS — Z52.001 STEM CELL DONOR: ICD-10-CM

## 2021-10-28 LAB
ANION GAP SERPL CALCULATED.3IONS-SCNC: 3 MMOL/L (ref 3–14)
BASOPHILS # BLD MANUAL: 0 10E3/UL (ref 0–0.2)
BASOPHILS NFR BLD MANUAL: 0 %
BUN SERPL-MCNC: 7 MG/DL (ref 7–30)
CALCIUM SERPL-MCNC: 8.7 MG/DL (ref 8.5–10.1)
CHLORIDE BLD-SCNC: 110 MMOL/L (ref 94–109)
CO2 SERPL-SCNC: 29 MMOL/L (ref 20–32)
CREAT SERPL-MCNC: 0.48 MG/DL (ref 0.52–1.04)
EOSINOPHIL # BLD MANUAL: 0 10E3/UL (ref 0–0.7)
EOSINOPHIL NFR BLD MANUAL: 0 %
ERYTHROCYTE [DISTWIDTH] IN BLOOD BY AUTOMATED COUNT: 14.5 % (ref 10–15)
GFR SERPL CREATININE-BSD FRML MDRD: >90 ML/MIN/1.73M2
GLUCOSE BLD-MCNC: 117 MG/DL (ref 70–99)
HCT VFR BLD AUTO: 28.7 % (ref 35–47)
HGB BLD-MCNC: 9.9 G/DL (ref 11.7–15.7)
LYMPHOCYTES # BLD MANUAL: 1 10E3/UL (ref 0.8–5.3)
LYMPHOCYTES NFR BLD MANUAL: 11 %
MCH RBC QN AUTO: 30.7 PG (ref 26.5–33)
MCHC RBC AUTO-ENTMCNC: 34.5 G/DL (ref 31.5–36.5)
MCV RBC AUTO: 89 FL (ref 78–100)
METAMYELOCYTES # BLD MANUAL: 0.3 10E3/UL
METAMYELOCYTES NFR BLD MANUAL: 3 %
MONOCYTES # BLD MANUAL: 0.5 10E3/UL (ref 0–1.3)
MONOCYTES NFR BLD MANUAL: 5 %
MYELOCYTES # BLD MANUAL: 0.1 10E3/UL
MYELOCYTES NFR BLD MANUAL: 1 %
NEUTROPHILS # BLD MANUAL: 7.2 10E3/UL (ref 1.6–8.3)
NEUTROPHILS NFR BLD MANUAL: 77 %
NRBC # BLD AUTO: 0.2 10E3/UL
NRBC BLD MANUAL-RTO: 2 %
PLAT MORPH BLD: ABNORMAL
PLATELET # BLD AUTO: 33 10E3/UL (ref 150–450)
POTASSIUM BLD-SCNC: 3.2 MMOL/L (ref 3.4–5.3)
PROMYELOCYTES # BLD MANUAL: 0.3 10E3/UL
PROMYELOCYTES NFR BLD MANUAL: 3 %
RBC # BLD AUTO: 3.23 10E6/UL (ref 3.8–5.2)
RBC MORPH BLD: ABNORMAL
SODIUM SERPL-SCNC: 142 MMOL/L (ref 133–144)
WBC # BLD AUTO: 9.4 10E3/UL (ref 4–11)

## 2021-10-28 PROCEDURE — 250N000011 HC RX IP 250 OP 636: Performed by: INTERNAL MEDICINE

## 2021-10-28 PROCEDURE — 80048 BASIC METABOLIC PNL TOTAL CA: CPT

## 2021-10-28 PROCEDURE — 96365 THER/PROPH/DIAG IV INF INIT: CPT

## 2021-10-28 PROCEDURE — 99214 OFFICE O/P EST MOD 30 MIN: CPT

## 2021-10-28 PROCEDURE — 250N000011 HC RX IP 250 OP 636: Performed by: PHYSICIAN ASSISTANT

## 2021-10-28 PROCEDURE — 36592 COLLECT BLOOD FROM PICC: CPT

## 2021-10-28 PROCEDURE — 85027 COMPLETE CBC AUTOMATED: CPT

## 2021-10-28 PROCEDURE — 96367 TX/PROPH/DG ADDL SEQ IV INF: CPT

## 2021-10-28 PROCEDURE — G0463 HOSPITAL OUTPT CLINIC VISIT: HCPCS | Mod: 25

## 2021-10-28 RX ORDER — HEPARIN SODIUM (PORCINE) LOCK FLUSH IV SOLN 100 UNIT/ML 100 UNIT/ML
5 SOLUTION INTRAVENOUS
Status: CANCELLED | OUTPATIENT
Start: 2021-10-29

## 2021-10-28 RX ORDER — HEPARIN SODIUM,PORCINE 10 UNIT/ML
5 VIAL (ML) INTRAVENOUS
Status: DISCONTINUED | OUTPATIENT
Start: 2021-10-28 | End: 2021-10-28 | Stop reason: HOSPADM

## 2021-10-28 RX ORDER — POTASSIUM CHLORIDE 29.8 MG/ML
20 INJECTION INTRAVENOUS ONCE
Status: COMPLETED | OUTPATIENT
Start: 2021-10-28 | End: 2021-10-28

## 2021-10-28 RX ORDER — HEPARIN SODIUM,PORCINE 10 UNIT/ML
5 VIAL (ML) INTRAVENOUS
Status: CANCELLED | OUTPATIENT
Start: 2021-10-29

## 2021-10-28 RX ORDER — VALACYCLOVIR HYDROCHLORIDE 500 MG/1
500 TABLET, FILM COATED ORAL 2 TIMES DAILY
Qty: 30 TABLET | Refills: 3 | Status: SHIPPED | OUTPATIENT
Start: 2021-10-28

## 2021-10-28 RX ADMIN — POTASSIUM CHLORIDE 20 MEQ: 29.8 INJECTION, SOLUTION INTRAVENOUS at 13:30

## 2021-10-28 RX ADMIN — POTASSIUM CHLORIDE 20 MEQ: 400 INJECTION, SOLUTION INTRAVENOUS at 12:30

## 2021-10-28 RX ADMIN — SODIUM CHLORIDE, PRESERVATIVE FREE 5 ML: 5 INJECTION INTRAVENOUS at 12:44

## 2021-10-28 RX ADMIN — SODIUM CHLORIDE, PRESERVATIVE FREE 5 ML: 5 INJECTION INTRAVENOUS at 12:43

## 2021-10-28 RX ADMIN — SODIUM CHLORIDE, PRESERVATIVE FREE 5 ML: 5 INJECTION INTRAVENOUS at 16:12

## 2021-10-28 ASSESSMENT — PAIN SCALES - GENERAL: PAINLEVEL: MODERATE PAIN (5)

## 2021-10-28 NOTE — NURSING NOTE
"Oncology Rooming Note    October 28, 2021 1:08 PM   Mil Seals is a 61 year old female who presents for:    Chief Complaint   Patient presents with     Blood Draw     Labs drawn via CVC by RN in lab. VS taken.      Oncology Clinic Visit     Patient with Multiple myeloma here for provider visit     Initial Vitals: /70   Pulse 108   Temp 98  F (36.7  C) (Oral)   Resp 14   Wt 73.3 kg (161 lb 11.2 oz)   SpO2 95%   BMI 25.99 kg/m   Estimated body mass index is 25.99 kg/m  as calculated from the following:    Height as of 10/22/21: 1.68 m (5' 6.14\").    Weight as of this encounter: 73.3 kg (161 lb 11.2 oz). Body surface area is 1.85 meters squared.  Moderate Pain (5) Comment: Data Unavailable   No LMP recorded. Patient is postmenopausal.  Allergies reviewed: Yes  Medications reviewed: Yes    Medications: Medication refills not needed today.  Pharmacy name entered into [employer]: [id number] - [place], [place] - [address] [place] [address]    Clinical concerns:       Jayla Saleem CMA              "

## 2021-10-28 NOTE — LETTER
10/28/2021         RE: Mil Seals  [address]  [facility] [address]        Dear Colleague,    Thank you for referring your patient, Mil Seals, to the [facility] BLOOD AND MARROW TRANSPLANT PROGRAM [place]. Please see a copy of my visit note below.    BMT Clinic Note   10/28/2021    Patient ID:  Mil Seals is a 61 year old woman D+13 s/p auto PBSCT for MM.     Diagnosis MM Multiple myeloma  BMTCT Type Autologous    Prep Regimen Melphalan   Primary BMT MD Dr. Sridhar Cullen   Clinical Trials   [id number]       INTERVAL  HISTORY   Mil is feeling better.  Attributes this to stopping GCSF & Acyclovir.  Ate dinner last night with minimal nausea, no emesis.  Afebrile with no cough or SOB.  Denies pain.  Feels she is having some palpitations.  Denies dizziness.  No bleeding or rash.      Review of Systems: 8 point ROS negative except as noted above.    PHYSICAL EXAM     KPS:  70    /70   Pulse 108   Temp 98  F (36.7  C) (Oral)   Resp 14   Wt 73.3 kg (161 lb 11.2 oz)   SpO2 95%   BMI 25.99 kg/m       General: NAD  ENT: oral mucosa moist and without ulcerations  CV: RRR. No m/r/g  Pulm: BCTA  Abd: Nt, nd, bowel sounds present  No edema  CVC: intact, no erythema or edema    Lab Results   Component Value Date    WBC 9.4 10/28/2021    ANEU 3.0 10/27/2021    HGB 9.9 (L) 10/28/2021    HCT 28.7 (L) 10/28/2021    PLT 33 (LL) 10/28/2021     10/27/2021    POTASSIUM 3.4 10/27/2021    CHLORIDE 108 10/27/2021    CO2 27 10/27/2021    GLC 89 10/27/2021    BUN 6 (L) 10/27/2021    CR 0.48 (L) 10/27/2021    MAG 1.8 10/26/2021    INR 0.99 10/14/2021    BILITOTAL 0.3 10/26/2021    AST 10 10/26/2021    ALT 30 10/26/2021    ALKPHOS 66 10/26/2021    PROTTOTAL 5.7 (L) 10/26/2021    ALBUMIN 2.8 (L) 10/26/2021       SYSTEMS-BASED ASSESSMENT AND PLAN       1. BMT/IEC PROTOCOL  MT 2016-35  Day-1 (10/14/2021) will get Melphalan and flush   Day 0 (10/15/2021) Transplant: received half of her  collected cells:  Total cell dose=10.8 X10^6 CD34/kg  G-CSF completed     2. HEME/COAG  Transfusion parameters: hemoglobin <7, platelets <10  No transfusions today.     3. ID:  Afebrile.  - Prophylaxis fluconazole, Levaquin discontinued 10/27   - ACV held due to dizziness, nausea, agitation and thoughts of self harm with this medication. Feeling better since being off it.  Will try Valtrex 500mg BID  - Begin Bactrim at day +28     4. GI/NUTRITION  - Ulcer prophylaxis: protonix  - Nausea 2/2 chemo/BMT: Zofran tid, zyprexa 2.5mg bid, with PRN Ativan. Emend 10/23 and 10/26.    5.  FEN  - Cr and lytes stable.   - Replace K IV today     6.   ENDOCRINE  US thyroid: 9/24/2021: Bilateral thyroid nodules. Among these, 1.1 cm left thyroid lobe meets the criteria for biopsy based on FDG avidity on recent PET/CT exam dated 9/21/2021. Per close note: t Since most thyroid cancers that arise in the context of multinodular thyroid are slow growing and this nodule was diagnosed incidentally, will proceed with transplant and biopsy this at count recovery, day 28 auto. Dr Joyner notified [place] by email to get biopsy scheduled then.     7. Neuropathy:  cont gabapentin.      8. Mood:  lamictal as mood stabilizer.  prasozin for her PTSD is on hold    9. CV:  EKG 10/25 d/t palpitations. NSR with PVCs. QTc okay. NSR on exam      RTC Sunday      I spent 30 minutes in the care of this patient today, which included time necessary for preparation for the visit, obtaining history, ordering medications/tests/procedures as medically indicated, review of pertinent medical literature, counseling of the patient, communication of recommendations to the care team, and documentation time.    Louise Russo        Again, thank you for allowing me to participate in the care of your patient.        Sincerely,        BMT Advanced Practice Provider

## 2021-10-28 NOTE — LETTER
10/28/2021         RE: Mil Seals  [address]  [facility] [address]        Dear Colleague,    Thank you for referring your patient, Mil Seals, to the [facility] BLOOD AND MARROW TRANSPLANT PROGRAM [place]. Please see a copy of my visit note below.    Infusion Nursing Note:  Mil Seals presents today for add-on infusion  Patient seen by provider today: Yes: Louise   present during visit today: Not Applicable.    Note: Patient received 40 mEq K+ IV per electrolyte protocol. Tenderness above CVC site-positive blood return, no redness/bruising and does not feel more tender with infusion. Provider notified.      Intravenous Access:  Olmedo.    Treatment Conditions:  Results reviewed, labs MET treatment parameters, ok to proceed with treatment.      Post Infusion Assessment:  Patient tolerated infusion without incident.       Discharge Plan:   Patient and/or family verbalized understanding of discharge instructions and all questions answered.      Thu Duong RN                          Again, thank you for allowing me to participate in the care of your patient.        Sincerely,        [facility]

## 2021-10-28 NOTE — NURSING NOTE
Chief Complaint   Patient presents with     Blood Draw     Labs drawn via CVC by RN in lab. VS taken.      CVC accessed, labs drawn. Line flushed and Heparin locked. Vital signs taken. Checked into next appointment.   Marly Camarillo RN

## 2021-10-28 NOTE — PROGRESS NOTES
Infusion Nursing Note:  Mil Seals presents today for add-on infusion  Patient seen by provider today: Yes: Louise   present during visit today: Not Applicable.    Note: Patient received 40 mEq K+ IV per electrolyte protocol. Tenderness above CVC site-positive blood return, no redness/bruising and does not feel more tender with infusion. Provider notified.      Intravenous Access:  Olmedo.    Treatment Conditions:  Results reviewed, labs MET treatment parameters, ok to proceed with treatment.      Post Infusion Assessment:  Patient tolerated infusion without incident.       Discharge Plan:   Patient and/or family verbalized understanding of discharge instructions and all questions answered.      Thu Duong RN

## 2021-10-30 LAB
BLD PROD TYP BPU: NORMAL
BLOOD COMPONENT TYPE: NORMAL
CODING SYSTEM: NORMAL
UNIT ABO/RH: NORMAL
UNIT NUMBER: NORMAL
UNIT STATUS: NORMAL
UNIT TYPE ISBT: 6200

## 2021-10-30 RX ORDER — HEPARIN SODIUM (PORCINE) LOCK FLUSH IV SOLN 100 UNIT/ML 100 UNIT/ML
5 SOLUTION INTRAVENOUS
Status: CANCELLED | OUTPATIENT
Start: 2021-10-30

## 2021-10-30 RX ORDER — HEPARIN SODIUM,PORCINE 10 UNIT/ML
5 VIAL (ML) INTRAVENOUS
Status: CANCELLED | OUTPATIENT
Start: 2021-10-30

## 2021-10-30 NOTE — PROGRESS NOTES
BMT Clinic Note   10/30/2021    Patient ID:  Mil Seals is a 61 year old woman D+16 s/p auto PBSCT for MM.     Diagnosis MM Multiple myeloma  BMTCT Type Autologous    Prep Regimen Melphalan   Primary BMT MD Dr. Sridhar Cullen   Clinical Trials   [id number]       INTERVAL  HISTORY   Mil is feeling tired and lightheaded this morning.  Thirsty.  She also reports frequent palpitations.  Starts around her chest and goes down her abdomen.  She reports not taking prazosin for the last 2 weeks since she went on to transplant.  She was taking that medication 3 times a day for PTSD.  No shortness of breath or cough.  Oral intake is good.  No bleeding or bruising.  No rashes.    Review of Systems: 8 point ROS negative except as noted above.    PHYSICAL EXAM     KPS:  70    /68   Pulse 103   Temp 98.8  F (37.1  C) (Oral)   Resp 20   Wt 72.2 kg (159 lb 3.2 oz)   SpO2 94%   BMI 25.59 kg/m     General: NAD  ENT: oral mucosa somewhat moist, with no ulcers or erosions.    CV: RRR. No m/r/g, mildly tachycardic.  Pulm: BCTA  Abd: Nt, nd, bowel sounds present  No edema  CVC: intact, no erythema or edema    Lab Results   Component Value Date    WBC 14.2 (H) 10/31/2021    ANEU 6.8 10/31/2021    HGB 10.5 (L) 10/31/2021    HCT 30.8 (L) 10/31/2021     (L) 10/31/2021     10/31/2021    POTASSIUM 3.8 10/31/2021    CHLORIDE 108 10/31/2021    CO2 26 10/31/2021     (H) 10/31/2021    BUN 8 10/31/2021    CR 0.51 (L) 10/31/2021    MAG 1.7 10/31/2021    INR 0.99 10/14/2021    BILITOTAL 0.3 10/26/2021    AST 10 10/26/2021    ALT 30 10/26/2021    ALKPHOS 66 10/26/2021    PROTTOTAL 5.7 (L) 10/26/2021    ALBUMIN 2.8 (L) 10/26/2021     EKG on 10/31/2021 was normal.  , heart rate 96 at rest    SYSTEMS-BASED ASSESSMENT AND PLAN       1. BMT/IEC PROTOCOL  MT 2016-35  Day-1 (10/14/2021) will get Melphalan and flush   Day 0 (10/15/2021) Transplant: received half of her collected cells:  Total cell dose=10.8 X10^6  CD34/kg  G-CSF completed     2. HEME/COAG: Counts are well recovered and she seems to have become transfusion independent.  Transfusion parameters: hemoglobin <7, platelets <10  No transfusions today.     3. ID:  Afebrile.   - Prophylaxis fluconazole, Levaquin discontinued 10/27   - ACV held due to dizziness, nausea, agitation and thoughts of self harm with this medication. Feeling better since being off it.  On Valtrex 500mg BID  - Begin Bactrim at day +28     4. GI/NUTRITION: Good oral intake.  - Ulcer prophylaxis: protonix  - Nausea 2/2 chemo/BMT: Zofran tid, zyprexa 2.5mg bid, with PRN Ativan. Emend 10/23 and 10/26.    5.  FEN: Electrolytes are normal but she was thirsty and lightheaded today.  Given normal saline 1 L IV in clinic.  - Cr and lytes stable.     6.   ENDOCRINE  US thyroid: 9/24/2021: Bilateral thyroid nodules. Among these, 1.1 cm left thyroid lobe meets the criteria for biopsy based on FDG avidity on recent PET/CT exam dated 9/21/2021. Per close note: t Since most thyroid cancers that arise in the context of multinodular thyroid are slow growing and this nodule was diagnosed incidentally, will proceed with transplant and biopsy this at count recovery, day 28 auto. Dr Joyner notified [place] by email to get biopsy scheduled then.     7. Neuropathy:  cont gabapentin.      8. Mood:  lamictal as mood stabilizer.  Restart prazosin 1 mg in the evening.  Monitor blood pressure and symptoms and go back to regular dose over time, as tolerated.    9. CV: Complains of palpitations.  No chest pain or pressure.  Had been off her prazosin for the last 2 weeks.  We will restart a low-dose at night and reassess symptoms.  EKG today, 10/31/2021, was normal     I spent 60 minutes in the care of this patient today, which included time necessary for preparation for the visit, obtaining history, ordering medications/tests/procedures as medically indicated, review of pertinent medical literature, counseling of the patient,  communication of recommendations to the care team, and documentation time.    Plan:  RTC BMT NP/PA on 11/2/21 in person with labs   Restart prazosin 1 mg in the evening  Given 1 L normal saline in clinic 10/31/2021  EKG 10/31/2021: Normal  Call come sooner if needed      Scooby Griggs MD on 10/31/2021 at 11:15 AM

## 2021-10-31 ENCOUNTER — INFUSION THERAPY VISIT (OUTPATIENT)
Dept: TRANSPLANT | Facility: CLINIC | Age: 61
End: 2021-10-31
Payer: MEDICAID

## 2021-10-31 ENCOUNTER — APPOINTMENT (OUTPATIENT)
Dept: LAB | Facility: CLINIC | Age: 61
End: 2021-10-31
Attending: INTERNAL MEDICINE
Payer: MEDICAID

## 2021-10-31 ENCOUNTER — ONCOLOGY VISIT (OUTPATIENT)
Dept: TRANSPLANT | Facility: CLINIC | Age: 61
End: 2021-10-31
Attending: INTERNAL MEDICINE
Payer: MEDICAID

## 2021-10-31 VITALS
RESPIRATION RATE: 20 BRPM | HEART RATE: 103 BPM | TEMPERATURE: 98.8 F | OXYGEN SATURATION: 94 % | DIASTOLIC BLOOD PRESSURE: 68 MMHG | WEIGHT: 159.2 LBS | SYSTOLIC BLOOD PRESSURE: 103 MMHG | BODY MASS INDEX: 25.59 KG/M2

## 2021-10-31 DIAGNOSIS — C90.00 MULTIPLE MYELOMA, REMISSION STATUS UNSPECIFIED (H): Primary | ICD-10-CM

## 2021-10-31 DIAGNOSIS — Z91.199 FAILURE TO ATTEND APPOINTMENT: ICD-10-CM

## 2021-10-31 DIAGNOSIS — C90.00 MULTIPLE MYELOMA, REMISSION STATUS UNSPECIFIED (H): ICD-10-CM

## 2021-10-31 LAB
ANION GAP SERPL CALCULATED.3IONS-SCNC: 8 MMOL/L (ref 3–14)
BASOPHILS # BLD MANUAL: 0 10E3/UL (ref 0–0.2)
BASOPHILS NFR BLD MANUAL: 0 %
BUN SERPL-MCNC: 8 MG/DL (ref 7–30)
CALCIUM SERPL-MCNC: 9 MG/DL (ref 8.5–10.1)
CHLORIDE BLD-SCNC: 108 MMOL/L (ref 94–109)
CO2 SERPL-SCNC: 26 MMOL/L (ref 20–32)
CREAT SERPL-MCNC: 0.51 MG/DL (ref 0.52–1.04)
EOSINOPHIL # BLD MANUAL: 0 10E3/UL (ref 0–0.7)
EOSINOPHIL NFR BLD MANUAL: 0 %
ERYTHROCYTE [DISTWIDTH] IN BLOOD BY AUTOMATED COUNT: 15 % (ref 10–15)
GFR SERPL CREATININE-BSD FRML MDRD: >90 ML/MIN/1.73M2
GLUCOSE BLD-MCNC: 109 MG/DL (ref 70–99)
HCT VFR BLD AUTO: 30.8 % (ref 35–47)
HGB BLD-MCNC: 10.5 G/DL (ref 11.7–15.7)
LYMPHOCYTES # BLD MANUAL: 3.3 10E3/UL (ref 0.8–5.3)
LYMPHOCYTES NFR BLD MANUAL: 23 %
MAGNESIUM SERPL-MCNC: 1.7 MG/DL (ref 1.6–2.3)
MCH RBC QN AUTO: 30.8 PG (ref 26.5–33)
MCHC RBC AUTO-ENTMCNC: 34.1 G/DL (ref 31.5–36.5)
MCV RBC AUTO: 90 FL (ref 78–100)
METAMYELOCYTES # BLD MANUAL: 0.4 10E3/UL
METAMYELOCYTES NFR BLD MANUAL: 3 %
MONOCYTES # BLD MANUAL: 1.8 10E3/UL (ref 0–1.3)
MONOCYTES NFR BLD MANUAL: 13 %
MYELOCYTES # BLD MANUAL: 1.8 10E3/UL
MYELOCYTES NFR BLD MANUAL: 13 %
NEUTROPHILS # BLD MANUAL: 6.8 10E3/UL (ref 1.6–8.3)
NEUTROPHILS NFR BLD MANUAL: 48 %
NRBC # BLD AUTO: 0.4 10E3/UL
NRBC BLD MANUAL-RTO: 3 %
PLAT MORPH BLD: ABNORMAL
PLATELET # BLD AUTO: 119 10E3/UL (ref 150–450)
POTASSIUM BLD-SCNC: 3.8 MMOL/L (ref 3.4–5.3)
RBC # BLD AUTO: 3.41 10E6/UL (ref 3.8–5.2)
RBC MORPH BLD: ABNORMAL
SODIUM SERPL-SCNC: 142 MMOL/L (ref 133–144)
WBC # BLD AUTO: 14.2 10E3/UL (ref 4–11)

## 2021-10-31 PROCEDURE — 36592 COLLECT BLOOD FROM PICC: CPT

## 2021-10-31 PROCEDURE — 250N000011 HC RX IP 250 OP 636: Performed by: INTERNAL MEDICINE

## 2021-10-31 PROCEDURE — G0463 HOSPITAL OUTPT CLINIC VISIT: HCPCS

## 2021-10-31 PROCEDURE — 93010 ELECTROCARDIOGRAM REPORT: CPT | Performed by: INTERNAL MEDICINE

## 2021-10-31 PROCEDURE — 83735 ASSAY OF MAGNESIUM: CPT

## 2021-10-31 PROCEDURE — 85027 COMPLETE CBC AUTOMATED: CPT

## 2021-10-31 PROCEDURE — 99215 OFFICE O/P EST HI 40 MIN: CPT

## 2021-10-31 PROCEDURE — 80048 BASIC METABOLIC PNL TOTAL CA: CPT

## 2021-10-31 PROCEDURE — 258N000003 HC RX IP 258 OP 636: Performed by: INTERNAL MEDICINE

## 2021-10-31 RX ORDER — HEPARIN SODIUM,PORCINE 10 UNIT/ML
5 VIAL (ML) INTRAVENOUS
Status: DISCONTINUED | OUTPATIENT
Start: 2021-10-31 | End: 2021-11-10 | Stop reason: HOSPADM

## 2021-10-31 RX ADMIN — SODIUM CHLORIDE 1000 ML: 9 INJECTION, SOLUTION INTRAVENOUS at 10:15

## 2021-10-31 RX ADMIN — SODIUM CHLORIDE, PRESERVATIVE FREE 5 ML: 5 INJECTION INTRAVENOUS at 11:50

## 2021-10-31 RX ADMIN — SODIUM CHLORIDE, PRESERVATIVE FREE 5 ML: 5 INJECTION INTRAVENOUS at 11:47

## 2021-10-31 NOTE — PROGRESS NOTES
Infusion Nursing Note:  Mil Arayarich Seals presents today for IVF.    Patient seen by provider today: Yes: Dr. Griggs   present during visit today: Not Applicable.    Note: Pt c/o heart palpitations and dizziness with upset stomach 1L NS bolus administered. EKG obtained for heart palpitations. Pt will start taking prazosin daily and will be reassessed on Tuesday.       Intravenous Access:  [place].    Treatment Conditions:  Results reviewed, labs MET treatment parameters, ok to proceed with treatment.      Post Infusion Assessment:  Patient tolerated infusion without incident.     Discharge Plan:   [facility]  Patient will return Tuesday for next appointment.   Patient discharged in stable condition   Everette Tracey RN

## 2021-10-31 NOTE — LETTER
10/31/2021     RE: Mil Seals  [address]  [facility] [address]    Dear Colleague,    Thank you for referring your patient, Mil Seals, to the [facility] BLOOD AND MARROW TRANSPLANT PROGRAM [place]. Please see a copy of my visit note below.    BMT Clinic Note   10/30/2021    Patient ID:  Mil Seals is a 61 year old woman D+16 s/p auto PBSCT for MM.     Diagnosis MM Multiple myeloma  BMTCT Type Autologous    Prep Regimen Melphalan   Primary BMT MD Dr. Sridhar Cullen   Clinical Trials   [id number]       INTERVAL  HISTORY   Mil is feeling tired and lightheaded this morning.  Thirsty.  She also reports frequent palpitations.  Starts around her chest and goes down her abdomen.  She reports not taking prazosin for the last 2 weeks since she went on to transplant.  She was taking that medication 3 times a day for PTSD.  No shortness of breath or cough.  Oral intake is good.  No bleeding or bruising.  No rashes.    Review of Systems: 8 point ROS negative except as noted above.    PHYSICAL EXAM     KPS:  70    /68   Pulse 103   Temp 98.8  F (37.1  C) (Oral)   Resp 20   Wt 72.2 kg (159 lb 3.2 oz)   SpO2 94%   BMI 25.59 kg/m     General: NAD  ENT: oral mucosa somewhat moist, with no ulcers or erosions.    CV: RRR. No m/r/g, mildly tachycardic.  Pulm: BCTA  Abd: Nt, nd, bowel sounds present  No edema  CVC: intact, no erythema or edema    Lab Results   Component Value Date    WBC 14.2 (H) 10/31/2021    ANEU 6.8 10/31/2021    HGB 10.5 (L) 10/31/2021    HCT 30.8 (L) 10/31/2021     (L) 10/31/2021     10/31/2021    POTASSIUM 3.8 10/31/2021    CHLORIDE 108 10/31/2021    CO2 26 10/31/2021     (H) 10/31/2021    BUN 8 10/31/2021    CR 0.51 (L) 10/31/2021    MAG 1.7 10/31/2021    INR 0.99 10/14/2021    BILITOTAL 0.3 10/26/2021    AST 10 10/26/2021    ALT 30 10/26/2021    ALKPHOS 66 10/26/2021    PROTTOTAL 5.7 (L) 10/26/2021    ALBUMIN 2.8 (L) 10/26/2021     EKG on  10/31/2021 was normal.  , heart rate 96 at rest    SYSTEMS-BASED ASSESSMENT AND PLAN       1. BMT/IEC PROTOCOL  MT 2016-35  Day-1 (10/14/2021) will get Melphalan and flush   Day 0 (10/15/2021) Transplant: received half of her collected cells:  Total cell dose=10.8 X10^6 CD34/kg  G-CSF completed     2. HEME/COAG: Counts are well recovered and she seems to have become transfusion independent.  Transfusion parameters: hemoglobin <7, platelets <10  No transfusions today.     3. ID:  Afebrile.   - Prophylaxis fluconazole, Levaquin discontinued 10/27   - ACV held due to dizziness, nausea, agitation and thoughts of self harm with this medication. Feeling better since being off it.  On Valtrex 500mg BID  - Begin Bactrim at day +28     4. GI/NUTRITION: Good oral intake.  - Ulcer prophylaxis: protonix  - Nausea 2/2 chemo/BMT: Zofran tid, zyprexa 2.5mg bid, with PRN Ativan. Emend 10/23 and 10/26.    5.  FEN: Electrolytes are normal but she was thirsty and lightheaded today.  Given normal saline 1 L IV in clinic.  - Cr and lytes stable.     6.   ENDOCRINE  US thyroid: 9/24/2021: Bilateral thyroid nodules. Among these, 1.1 cm left thyroid lobe meets the criteria for biopsy based on FDG avidity on recent PET/CT exam dated 9/21/2021. Per close note: t Since most thyroid cancers that arise in the context of multinodular thyroid are slow growing and this nodule was diagnosed incidentally, will proceed with transplant and biopsy this at count recovery, day 28 auto. Dr Joyner notified [place] by email to get biopsy scheduled then.     7. Neuropathy:  cont gabapentin.      8. Mood:  lamictal as mood stabilizer.  Restart prazosin 1 mg in the evening.  Monitor blood pressure and symptoms and go back to regular dose over time, as tolerated.    9. CV: Complains of palpitations.  No chest pain or pressure.  Had been off her prazosin for the last 2 weeks.  We will restart a low-dose at night and reassess symptoms.  EKG today, 10/31/2021,  was normal     I spent 60 minutes in the care of this patient today, which included time necessary for preparation for the visit, obtaining history, ordering medications/tests/procedures as medically indicated, review of pertinent medical literature, counseling of the patient, communication of recommendations to the care team, and documentation time.    Plan:  RTC BMT NP/PA on 11/2/21 in person with labs   Restart prazosin 1 mg in the evening  Given 1 L normal saline in clinic 10/31/2021  EKG 10/31/2021: Normal  Call come sooner if needed      Scooby Griggs MD on 10/31/2021 at 11:15 AM

## 2021-11-01 LAB
ATRIAL RATE - MUSE: 96 BPM
DIASTOLIC BLOOD PRESSURE - MUSE: NORMAL MMHG
INTERPRETATION ECG - MUSE: NORMAL
P AXIS - MUSE: 84 DEGREES
PR INTERVAL - MUSE: 152 MS
QRS DURATION - MUSE: 72 MS
QT - MUSE: 354 MS
QTC - MUSE: 447 MS
R AXIS - MUSE: 79 DEGREES
SYSTOLIC BLOOD PRESSURE - MUSE: NORMAL MMHG
T AXIS - MUSE: 77 DEGREES
VENTRICULAR RATE- MUSE: 96 BPM

## 2021-11-02 NOTE — PROGRESS NOTES
BMT Clinic Note   11/03/2021    Patient ID:  Mil Seals is a 61 year old woman D+16 s/p auto PBSCT for MM.     Diagnosis MM Multiple myeloma  BMTCT Type Autologous    Prep Regimen Melphalan   Primary BMT MD Dr. Sridhar Cullen   Clinical Trials   [id number]       INTERVAL  HISTORY   Mil is feeling tired but otherwise she is doing Ok.  She has restarted her prazosin, which has helped her well being but may have contributed to mild lightheadedness.  She is eating more but having difficulty with appetite and food choices.  She has no pain, intermittent nausea and no vomiting.  She is constipated.  Her drinking is of water, club soda, and rice milk.      Review of Systems: 8 point ROS negative except as noted above.    PHYSICAL EXAM     KPS:  70    BP (!) 86/51 (BP Location: Right arm, Patient Position: Sitting, Cuff Size: Adult Regular)   Pulse 109   Temp 98  F (36.7  C) (Tympanic)   Resp 16   Wt 73.2 kg (161 lb 6.4 oz)   SpO2 97%   BMI 25.94 kg/m     General: NAD  ENT: oral mucosa somewhat moist, with no ulcers or erosions.    CV: RRR. No m/r/g, mildly tachycardic.  Pulm: BCTA  No edema  CVC: intact, no erythema or edema    Lab Results   Component Value Date    WBC 9.9 11/03/2021    ANEU 6.8 10/31/2021    HGB 10.1 (L) 11/03/2021    HCT 31.4 (L) 11/03/2021     11/03/2021     10/31/2021    POTASSIUM 3.8 10/31/2021    CHLORIDE 108 10/31/2021    CO2 26 10/31/2021     (H) 10/31/2021    BUN 8 10/31/2021    CR 0.51 (L) 10/31/2021    MAG 1.7 10/31/2021    INR 0.99 10/14/2021    BILITOTAL 0.3 10/26/2021    AST 10 10/26/2021    ALT 30 10/26/2021    ALKPHOS 66 10/26/2021    PROTTOTAL 5.7 (L) 10/26/2021    ALBUMIN 2.8 (L) 10/26/2021     EKG on 10/31/2021 was normal.  , heart rate 96 at rest    SYSTEMS-BASED ASSESSMENT AND PLAN       1. BMT/IEC PROTOCOL  MT 2016-35  Day-1 (10/14/2021) will get Melphalan and flush   Day 0 (10/15/2021) Transplant: received half of her collected cells:  Total  cell dose=10.8 X10^6 CD34/kg  G-CSF completed     2. HEME/COAG: Counts are well recovered and she seems to have become transfusion independent.  Transfusion parameters: hemoglobin <7, platelets <10  No transfusions today.     3. ID:  Afebrile.   - Prophylaxis fluconazole, Levaquin discontinued 10/27   - ACV held due to dizziness, nausea, agitation and thoughts of self harm with this medication. Feeling better since being off it.  On Valtrex 500mg BID  - Begin Bactrim at day +28     4. GI/NUTRITION: Good oral intake.  - Ulcer prophylaxis: protonix  - Nausea 2/2 chemo/BMT: Zofran tid, zyprexa 2.5mg bid, with PRN Ativan. Emend 10/23 and 10/26.    5.  FEN: Electrolytes are normal but she was thirsty and lightheaded today. We discussed choosing beverages with salt (sports drinks and soups)  - Cr and lytes stable.     6.   ENDOCRINE  US thyroid: 9/24/2021: Bilateral thyroid nodules. Among these, 1.1 cm left thyroid lobe meets the criteria for biopsy based on FDG avidity on recent PET/CT exam dated 9/21/2021. Per close note: t Since most thyroid cancers that arise in the context of multinodular thyroid are slow growing and this nodule was diagnosed incidentally, will proceed with transplant and biopsy this at count recovery, day 28 auto. Dr Joyner notified [place] by email to get biopsy scheduled then.     7. Neuropathy:  cont gabapentin.      8. Mood:  lamictal as mood stabilizer.  Restart prazosin 1 mg in the evening.  Monitor blood pressure and symptoms and go back to regular dose over time, as tolerated.    9. CV: Complains of palpitations.  Better on prazosin    I spent 40 minutes in the care of this patient today, which included time necessary for preparation for the visit, obtaining history, ordering medications/tests/procedures as medically indicated, review of pertinent medical literature, counseling of the patient, communication of recommendations to the care team, and documentation time.    Plan:  RTC  thursday    OLIVER SOLER MD  November 3, 2021

## 2021-11-03 ENCOUNTER — APPOINTMENT (OUTPATIENT)
Dept: LAB | Facility: CLINIC | Age: 61
End: 2021-11-03
Attending: INTERNAL MEDICINE
Payer: COMMERCIAL

## 2021-11-03 ENCOUNTER — ONCOLOGY VISIT (OUTPATIENT)
Dept: TRANSPLANT | Facility: CLINIC | Age: 61
End: 2021-11-03
Attending: INTERNAL MEDICINE
Payer: MEDICAID

## 2021-11-03 ENCOUNTER — TELEPHONE (OUTPATIENT)
Dept: TRANSPLANT | Facility: CLINIC | Age: 61
End: 2021-11-03

## 2021-11-03 VITALS
BODY MASS INDEX: 25.94 KG/M2 | OXYGEN SATURATION: 97 % | HEART RATE: 109 BPM | RESPIRATION RATE: 16 BRPM | WEIGHT: 161.4 LBS | SYSTOLIC BLOOD PRESSURE: 86 MMHG | DIASTOLIC BLOOD PRESSURE: 51 MMHG | TEMPERATURE: 98 F

## 2021-11-03 DIAGNOSIS — C90.00 MULTIPLE MYELOMA, REMISSION STATUS UNSPECIFIED (H): ICD-10-CM

## 2021-11-03 LAB
ALBUMIN SERPL-MCNC: 3 G/DL (ref 3.4–5)
ALP SERPL-CCNC: 80 U/L (ref 40–150)
ALT SERPL W P-5'-P-CCNC: 27 U/L (ref 0–50)
ANION GAP SERPL CALCULATED.3IONS-SCNC: 2 MMOL/L (ref 3–14)
AST SERPL W P-5'-P-CCNC: 17 U/L (ref 0–45)
BASOPHILS # BLD AUTO: 0 10E3/UL (ref 0–0.2)
BASOPHILS NFR BLD AUTO: 0 %
BILIRUB SERPL-MCNC: 0.1 MG/DL (ref 0.2–1.3)
BUN SERPL-MCNC: 14 MG/DL (ref 7–30)
CALCIUM SERPL-MCNC: 8.9 MG/DL (ref 8.5–10.1)
CHLORIDE BLD-SCNC: 107 MMOL/L (ref 94–109)
CO2 SERPL-SCNC: 28 MMOL/L (ref 20–32)
CREAT SERPL-MCNC: 0.58 MG/DL (ref 0.52–1.04)
EOSINOPHIL # BLD AUTO: 0 10E3/UL (ref 0–0.7)
EOSINOPHIL NFR BLD AUTO: 0 %
ERYTHROCYTE [DISTWIDTH] IN BLOOD BY AUTOMATED COUNT: 15.5 % (ref 10–15)
GFR SERPL CREATININE-BSD FRML MDRD: >90 ML/MIN/1.73M2
GLUCOSE BLD-MCNC: 138 MG/DL (ref 70–99)
HCT VFR BLD AUTO: 31.4 % (ref 35–47)
HGB BLD-MCNC: 10.1 G/DL (ref 11.7–15.7)
IMM GRANULOCYTES # BLD: 0.4 10E3/UL
IMM GRANULOCYTES NFR BLD: 4 %
LYMPHOCYTES # BLD AUTO: 2.7 10E3/UL (ref 0.8–5.3)
LYMPHOCYTES NFR BLD AUTO: 27 %
MAGNESIUM SERPL-MCNC: 1.8 MG/DL (ref 1.6–2.3)
MCH RBC QN AUTO: 30.1 PG (ref 26.5–33)
MCHC RBC AUTO-ENTMCNC: 32.2 G/DL (ref 31.5–36.5)
MCV RBC AUTO: 94 FL (ref 78–100)
MONOCYTES # BLD AUTO: 1.4 10E3/UL (ref 0–1.3)
MONOCYTES NFR BLD AUTO: 14 %
NEUTROPHILS # BLD AUTO: 5.3 10E3/UL (ref 1.6–8.3)
NEUTROPHILS NFR BLD AUTO: 55 %
NRBC # BLD AUTO: 0 10E3/UL
NRBC BLD AUTO-RTO: 0 /100
PLATELET # BLD AUTO: 245 10E3/UL (ref 150–450)
POTASSIUM BLD-SCNC: 3.7 MMOL/L (ref 3.4–5.3)
PROT SERPL-MCNC: 6 G/DL (ref 6.8–8.8)
RBC # BLD AUTO: 3.35 10E6/UL (ref 3.8–5.2)
SODIUM SERPL-SCNC: 137 MMOL/L (ref 133–144)
WBC # BLD AUTO: 9.9 10E3/UL (ref 4–11)

## 2021-11-03 PROCEDURE — 36592 COLLECT BLOOD FROM PICC: CPT

## 2021-11-03 PROCEDURE — 99214 OFFICE O/P EST MOD 30 MIN: CPT

## 2021-11-03 PROCEDURE — 85025 COMPLETE CBC W/AUTO DIFF WBC: CPT

## 2021-11-03 PROCEDURE — G0463 HOSPITAL OUTPT CLINIC VISIT: HCPCS

## 2021-11-03 PROCEDURE — 83735 ASSAY OF MAGNESIUM: CPT

## 2021-11-03 PROCEDURE — 82040 ASSAY OF SERUM ALBUMIN: CPT

## 2021-11-03 PROCEDURE — 250N000011 HC RX IP 250 OP 636: Performed by: INTERNAL MEDICINE

## 2021-11-03 RX ORDER — HEPARIN SODIUM,PORCINE 10 UNIT/ML
5 VIAL (ML) INTRAVENOUS
Status: DISCONTINUED | OUTPATIENT
Start: 2021-11-03 | End: 2021-11-03 | Stop reason: HOSPADM

## 2021-11-03 RX ADMIN — Medication 5 ML: at 11:53

## 2021-11-03 RX ADMIN — Medication 5 ML: at 11:52

## 2021-11-03 ASSESSMENT — PAIN SCALES - GENERAL: PAINLEVEL: MILD PAIN (3)

## 2021-11-03 NOTE — TELEPHONE ENCOUNTER
BMT CLINICAL SOCIAL WORK NOTE:    Focus: Resources    Data: Pt is a 61 year old female s/p auto BMT for MM, currently day +19.    Interventions: Clinical  (CSW) received a request from the pt to help with communication with [employer] [phone] regarding he lodging request. The pt noted that she did get a call from [employer] over the weekend and has tried to call them back multiple times, but she has not gotten any return calls. TYRONE called [employer] this AM and spoke with Komal who notes she can not help with these claims, she passed my request on to Shereen who note she does see a claim open and will place another request for a call and will request they call this writer back. Shereen noted the claim request number is [id number]. TYRONE updated the pt with this information. Pt also noted she will drop her qian applications off in clinic today for TYRONE.TYRONE encouraged Pt to contact CSW for support, questions and/or resources.    Assessment: Pt presented as friendly, but noted she has been tired.      Plan: CSW will continue to work with Pt and family to provide supportive counseling and assist with resources as needed. CSW will continue to collaborate with multidisciplinary team regarding Pt's plan of care.     JEMIMA Lyn, [facility]  Pager: [phone]  Phone: [phone]

## 2021-11-03 NOTE — NURSING NOTE
Pt had dressing changed using Sterile technique. Site is clean and dry. New dressing for sensitive skin was applied. Patient tolerated well. Please see flow sheet for additional details. LPN printed AVS for patient and patient was discharged after.      Angela Johnson LPN November 3, 2021 1:21 PM

## 2021-11-03 NOTE — NURSING NOTE
Chief Complaint   Patient presents with     Blood Draw     labs drawn from cvc by rn.  vs taken     Labs drawn from CVC by rn.  Good blood return noted in both lumens.  Both lumens flushed with NS and heparin.  Pt tolerated well.  VS taken; BP 86/51 relayed to provider.  Pt checked in for next appt.    Cathleen Fournier RN

## 2021-11-03 NOTE — LETTER
11/3/2021         RE: Mil Seals  [address]  [place] [facility] [address]        Dear Colleague,    Thank you for referring your patient, Mil Seals, to the [facility] BLOOD AND MARROW TRANSPLANT PROGRAM [place]. Please see a copy of my visit note below.    BMT Clinic Note   11/03/2021    Patient ID:  Mil Seals is a 61 year old woman D+16 s/p auto PBSCT for MM.     Diagnosis MM Multiple myeloma  BMTCT Type Autologous    Prep Regimen Melphalan   Primary BMT MD Dr. Sridhar Cullen   Clinical Trials   [id number]       INTERVAL  HISTORY   Mil is feeling tired but otherwise she is doing Ok.  She has restarted her prazosin, which has helped her well being but may have contributed to mild lightheadedness.  She is eating more but having difficulty with appetite and food choices.  She has no pain, intermittent nausea and no vomiting.  She is constipated.  Her drinking is of water, club soda, and rice milk.      Review of Systems: 8 point ROS negative except as noted above.    PHYSICAL EXAM     KPS:  70    BP (!) 86/51 (BP Location: Right arm, Patient Position: Sitting, Cuff Size: Adult Regular)   Pulse 109   Temp 98  F (36.7  C) (Tympanic)   Resp 16   Wt 73.2 kg (161 lb 6.4 oz)   SpO2 97%   BMI 25.94 kg/m     General: NAD  ENT: oral mucosa somewhat moist, with no ulcers or erosions.    CV: RRR. No m/r/g, mildly tachycardic.  Pulm: BCTA  No edema  CVC: intact, no erythema or edema    Lab Results   Component Value Date    WBC 9.9 11/03/2021    ANEU 6.8 10/31/2021    HGB 10.1 (L) 11/03/2021    HCT 31.4 (L) 11/03/2021     11/03/2021     10/31/2021    POTASSIUM 3.8 10/31/2021    CHLORIDE 108 10/31/2021    CO2 26 10/31/2021     (H) 10/31/2021    BUN 8 10/31/2021    CR 0.51 (L) 10/31/2021    MAG 1.7 10/31/2021    INR 0.99 10/14/2021    BILITOTAL 0.3 10/26/2021    AST 10 10/26/2021    ALT 30 10/26/2021    ALKPHOS 66 10/26/2021    PROTTOTAL 5.7 (L) 10/26/2021    ALBUMIN  2.8 (L) 10/26/2021     EKG on 10/31/2021 was normal.  , heart rate 96 at rest    SYSTEMS-BASED ASSESSMENT AND PLAN       1. BMT/IEC PROTOCOL  MT 2016-35  Day-1 (10/14/2021) will get Melphalan and flush   Day 0 (10/15/2021) Transplant: received half of her collected cells:  Total cell dose=10.8 X10^6 CD34/kg  G-CSF completed     2. HEME/COAG: Counts are well recovered and she seems to have become transfusion independent.  Transfusion parameters: hemoglobin <7, platelets <10  No transfusions today.     3. ID:  Afebrile.   - Prophylaxis fluconazole, Levaquin discontinued 10/27   - ACV held due to dizziness, nausea, agitation and thoughts of self harm with this medication. Feeling better since being off it.  On Valtrex 500mg BID  - Begin Bactrim at day +28     4. GI/NUTRITION: Good oral intake.  - Ulcer prophylaxis: protonix  - Nausea 2/2 chemo/BMT: Zofran tid, zyprexa 2.5mg bid, with PRN Ativan. Emend 10/23 and 10/26.    5.  FEN: Electrolytes are normal but she was thirsty and lightheaded today. We discussed choosing beverages with salt (sports drinks and soups)  - Cr and lytes stable.     6.   ENDOCRINE  US thyroid: 9/24/2021: Bilateral thyroid nodules. Among these, 1.1 cm left thyroid lobe meets the criteria for biopsy based on FDG avidity on recent PET/CT exam dated 9/21/2021. Per close note: t Since most thyroid cancers that arise in the context of multinodular thyroid are slow growing and this nodule was diagnosed incidentally, will proceed with transplant and biopsy this at count recovery, day 28 auto. Dr Joyner notified [place] by email to get biopsy scheduled then.     7. Neuropathy:  cont gabapentin.      8. Mood:  lamictal as mood stabilizer.  Restart prazosin 1 mg in the evening.  Monitor blood pressure and symptoms and go back to regular dose over time, as tolerated.    9. CV: Complains of palpitations.  Better on prazosin    I spent 40 minutes in the care of this patient today, which included time  necessary for preparation for the visit, obtaining history, ordering medications/tests/procedures as medically indicated, review of pertinent medical literature, counseling of the patient, communication of recommendations to the care team, and documentation time.    Plan:  RTC thursday      OLIVER SOLER MD  November 3, 2021

## 2021-11-03 NOTE — NURSING NOTE
"Oncology Rooming Note    November 3, 2021 12:10 PM   Mil Seals is a 61 year old female who presents for:    Chief Complaint   Patient presents with     Blood Draw     labs drawn from cvc by rn.  vs taken     Oncology Clinic Visit     multiple myeloma     Initial Vitals: BP (!) 86/51 (BP Location: Right arm, Patient Position: Sitting, Cuff Size: Adult Regular)   Pulse 109   Temp 98  F (36.7  C) (Tympanic)   Resp 16   Wt 73.2 kg (161 lb 6.4 oz)   SpO2 97%   BMI 25.94 kg/m   Estimated body mass index is 25.94 kg/m  as calculated from the following:    Height as of 10/22/21: 1.68 m (5' 6.14\").    Weight as of this encounter: 73.2 kg (161 lb 6.4 oz). Body surface area is 1.85 meters squared.  Mild Pain (3) Comment: Data Unavailable   No LMP recorded. Patient is postmenopausal.  Allergies reviewed: Yes  Medications reviewed: Yes    Medications: MEDICATION REFILLS NEEDED TODAY. Provider was notified. Lamotrigine, protonix, claritin and vacyclovir need refill, please send downstairs    Pharmacy name entered into [employer]: [id number] - [place], [place] - [address] [place] [address]    Clinical concerns: none       Penelope Maldonado CMA            "

## 2021-11-04 ENCOUNTER — TELEPHONE (OUTPATIENT)
Dept: TRANSPLANT | Facility: CLINIC | Age: 61
End: 2021-11-04
Payer: MEDICAID

## 2021-11-04 DIAGNOSIS — Z94.81 S/P BONE MARROW TRANSPLANT (H): ICD-10-CM

## 2021-11-04 LAB — CMV DNA SPEC NAA+PROBE-ACNC: NOT DETECTED IU/ML

## 2021-11-04 RX ORDER — PANTOPRAZOLE SODIUM 40 MG/1
40 TABLET, DELAYED RELEASE ORAL DAILY
Qty: 30 TABLET | Refills: 0 | Status: SHIPPED | OUTPATIENT
Start: 2021-11-04

## 2021-11-04 NOTE — TELEPHONE ENCOUNTER
Pt's caregiver called saying pt was worried about a bacterial infection.    I spoke with Mil directly and she tells me she has no current concerns.  Other than fatigue, she is feeling well.  Progress note from yesterday reviewed.    She notes her caregiver has a migraine and she is helping to actually care for her today.    She will f/u in BMT clinic next Wed unless a new symptom arises.    Pattie Lyn pa-c  [phone]

## 2021-11-05 DIAGNOSIS — C90.00 MULTIPLE MYELOMA, REMISSION STATUS UNSPECIFIED (H): Primary | ICD-10-CM

## 2021-11-10 ENCOUNTER — LAB (OUTPATIENT)
Dept: LAB | Facility: CLINIC | Age: 61
End: 2021-11-10
Attending: INTERNAL MEDICINE
Payer: MEDICAID

## 2021-11-10 DIAGNOSIS — C90.00 MULTIPLE MYELOMA, REMISSION STATUS UNSPECIFIED (H): Primary | ICD-10-CM

## 2021-11-10 LAB
ALBUMIN SERPL-MCNC: 3 G/DL (ref 3.4–5)
ALP SERPL-CCNC: 67 U/L (ref 40–150)
ALT SERPL W P-5'-P-CCNC: 23 U/L (ref 0–50)
ANION GAP SERPL CALCULATED.3IONS-SCNC: 4 MMOL/L (ref 3–14)
AST SERPL W P-5'-P-CCNC: 14 U/L (ref 0–45)
BASOPHILS # BLD AUTO: 0.1 10E3/UL (ref 0–0.2)
BASOPHILS NFR BLD AUTO: 2 %
BILIRUB SERPL-MCNC: 0.2 MG/DL (ref 0.2–1.3)
BUN SERPL-MCNC: 10 MG/DL (ref 7–30)
CALCIUM SERPL-MCNC: 8.5 MG/DL (ref 8.5–10.1)
CHLORIDE BLD-SCNC: 110 MMOL/L (ref 94–109)
CO2 SERPL-SCNC: 28 MMOL/L (ref 20–32)
CREAT SERPL-MCNC: 0.53 MG/DL (ref 0.52–1.04)
EOSINOPHIL # BLD AUTO: 0.6 10E3/UL (ref 0–0.7)
EOSINOPHIL NFR BLD AUTO: 9 %
ERYTHROCYTE [DISTWIDTH] IN BLOOD BY AUTOMATED COUNT: 16.4 % (ref 10–15)
GFR SERPL CREATININE-BSD FRML MDRD: >90 ML/MIN/1.73M2
GLUCOSE BLD-MCNC: 104 MG/DL (ref 70–99)
HCT VFR BLD AUTO: 31.2 % (ref 35–47)
HGB BLD-MCNC: 10.2 G/DL (ref 11.7–15.7)
IMM GRANULOCYTES # BLD: 0 10E3/UL
IMM GRANULOCYTES NFR BLD: 0 %
LDH SERPL L TO P-CCNC: 242 U/L (ref 81–234)
LYMPHOCYTES # BLD AUTO: 2 10E3/UL (ref 0.8–5.3)
LYMPHOCYTES NFR BLD AUTO: 29 %
MAGNESIUM SERPL-MCNC: 2 MG/DL (ref 1.6–2.3)
MCH RBC QN AUTO: 30.6 PG (ref 26.5–33)
MCHC RBC AUTO-ENTMCNC: 32.7 G/DL (ref 31.5–36.5)
MCV RBC AUTO: 94 FL (ref 78–100)
MONOCYTES # BLD AUTO: 1.1 10E3/UL (ref 0–1.3)
MONOCYTES NFR BLD AUTO: 16 %
NEUTROPHILS # BLD AUTO: 3 10E3/UL (ref 1.6–8.3)
NEUTROPHILS NFR BLD AUTO: 44 %
NRBC # BLD AUTO: 0 10E3/UL
NRBC BLD AUTO-RTO: 0 /100
PHOSPHATE SERPL-MCNC: 3.7 MG/DL (ref 2.5–4.5)
PLATELET # BLD AUTO: 271 10E3/UL (ref 150–450)
POTASSIUM BLD-SCNC: 4.1 MMOL/L (ref 3.4–5.3)
PROT SERPL-MCNC: 5.8 G/DL (ref 6.8–8.8)
RBC # BLD AUTO: 3.33 10E6/UL (ref 3.8–5.2)
SARS-COV-2 RNA RESP QL NAA+PROBE: NEGATIVE
SODIUM SERPL-SCNC: 142 MMOL/L (ref 133–144)
TOTAL PROTEIN SERUM FOR ELP: 5.5 G/DL (ref 6.8–8.8)
URATE SERPL-MCNC: 3.4 MG/DL (ref 2.6–6)
WBC # BLD AUTO: 6.7 10E3/UL (ref 4–11)

## 2021-11-10 PROCEDURE — 84155 ASSAY OF PROTEIN SERUM: CPT | Performed by: PHYSICIAN ASSISTANT

## 2021-11-10 PROCEDURE — 86334 IMMUNOFIX E-PHORESIS SERUM: CPT | Mod: TC | Performed by: PHYSICIAN ASSISTANT

## 2021-11-10 PROCEDURE — 84165 PROTEIN E-PHORESIS SERUM: CPT | Mod: 26 | Performed by: PATHOLOGY

## 2021-11-10 PROCEDURE — 83735 ASSAY OF MAGNESIUM: CPT | Performed by: PHYSICIAN ASSISTANT

## 2021-11-10 PROCEDURE — 83883 ASSAY NEPHELOMETRY NOT SPEC: CPT | Performed by: PHYSICIAN ASSISTANT

## 2021-11-10 PROCEDURE — 82784 ASSAY IGA/IGD/IGG/IGM EACH: CPT | Performed by: PHYSICIAN ASSISTANT

## 2021-11-10 PROCEDURE — U0003 INFECTIOUS AGENT DETECTION BY NUCLEIC ACID (DNA OR RNA); SEVERE ACUTE RESPIRATORY SYNDROME CORONAVIRUS 2 (SARS-COV-2) (CORONAVIRUS DISEASE [COVID-19]), AMPLIFIED PROBE TECHNIQUE, MAKING USE OF HIGH THROUGHPUT TECHNOLOGIES AS DESCRIBED BY CMS-2020-01-R: HCPCS | Performed by: INTERNAL MEDICINE

## 2021-11-10 PROCEDURE — 85025 COMPLETE CBC W/AUTO DIFF WBC: CPT | Performed by: PHYSICIAN ASSISTANT

## 2021-11-10 PROCEDURE — 83615 LACTATE (LD) (LDH) ENZYME: CPT | Performed by: PHYSICIAN ASSISTANT

## 2021-11-10 PROCEDURE — 84100 ASSAY OF PHOSPHORUS: CPT | Performed by: PHYSICIAN ASSISTANT

## 2021-11-10 PROCEDURE — 80053 COMPREHEN METABOLIC PANEL: CPT | Performed by: PHYSICIAN ASSISTANT

## 2021-11-10 PROCEDURE — 86334 IMMUNOFIX E-PHORESIS SERUM: CPT | Mod: 26 | Performed by: PATHOLOGY

## 2021-11-10 PROCEDURE — 84165 PROTEIN E-PHORESIS SERUM: CPT | Mod: TC | Performed by: PATHOLOGY

## 2021-11-10 PROCEDURE — 84550 ASSAY OF BLOOD/URIC ACID: CPT | Performed by: PHYSICIAN ASSISTANT

## 2021-11-10 NOTE — NURSING NOTE
Chief Complaint   Patient presents with     Blood Draw     Labs drawn via CVC by RN in lab.      Labs collected from CVC by RN, line flushed with saline and heparin.      Danika KUMAR RN PHN BSN  BMT/Oncology Lab

## 2021-11-11 LAB
ALBUMIN SERPL ELPH-MCNC: 3.5 G/DL (ref 3.7–5.1)
ALPHA1 GLOB SERPL ELPH-MCNC: 0.3 G/DL (ref 0.2–0.4)
ALPHA2 GLOB SERPL ELPH-MCNC: 0.8 G/DL (ref 0.5–0.9)
B-GLOBULIN SERPL ELPH-MCNC: 0.6 G/DL (ref 0.6–1)
GAMMA GLOB SERPL ELPH-MCNC: 0.2 G/DL (ref 0.7–1.6)
IGA SERPL-MCNC: 6 MG/DL (ref 84–499)
IGG SERPL-MCNC: 177 MG/DL (ref 610–1616)
IGM SERPL-MCNC: <10 MG/DL (ref 35–242)
KAPPA LC FREE SER-MCNC: 0.07 MG/DL (ref 0.33–1.94)
KAPPA LC FREE/LAMBDA FREE SER NEPH: ABNORMAL {RATIO}
LAMBDA LC FREE SERPL-MCNC: <0.15 MG/DL (ref 0.57–2.63)
M PROTEIN SERPL ELPH-MCNC: 0.1 G/DL
PROT PATTERN SERPL ELPH-IMP: ABNORMAL
PROT PATTERN SERPL IFE-IMP: NORMAL

## 2021-11-12 ENCOUNTER — OFFICE VISIT (OUTPATIENT)
Dept: TRANSPLANT | Facility: CLINIC | Age: 61
End: 2021-11-12
Attending: INTERNAL MEDICINE
Payer: MEDICAID

## 2021-11-12 ENCOUNTER — ANCILLARY PROCEDURE (OUTPATIENT)
Dept: ULTRASOUND IMAGING | Facility: CLINIC | Age: 61
End: 2021-11-12
Attending: INTERNAL MEDICINE
Payer: MEDICAID

## 2021-11-12 VITALS
BODY MASS INDEX: 26.55 KG/M2 | TEMPERATURE: 98.5 F | HEART RATE: 113 BPM | SYSTOLIC BLOOD PRESSURE: 120 MMHG | OXYGEN SATURATION: 95 % | DIASTOLIC BLOOD PRESSURE: 77 MMHG | RESPIRATION RATE: 16 BRPM | WEIGHT: 165.2 LBS

## 2021-11-12 DIAGNOSIS — C90.00 MULTIPLE MYELOMA, REMISSION STATUS UNSPECIFIED (H): ICD-10-CM

## 2021-11-12 PROCEDURE — 36589 REMOVAL TUNNELED CV CATH: CPT | Performed by: PHYSICIAN ASSISTANT

## 2021-11-12 PROCEDURE — G0463 HOSPITAL OUTPT CLINIC VISIT: HCPCS

## 2021-11-12 PROCEDURE — 99214 OFFICE O/P EST MOD 30 MIN: CPT | Mod: 25

## 2021-11-12 RX ORDER — LIDOCAINE HYDROCHLORIDE 10 MG/ML
5 INJECTION, SOLUTION EPIDURAL; INFILTRATION; INTRACAUDAL; PERINEURAL ONCE
Status: COMPLETED | OUTPATIENT
Start: 2021-11-12 | End: 2021-11-12

## 2021-11-12 RX ORDER — OLANZAPINE 2.5 MG/1
2.5 TABLET, FILM COATED ORAL DAILY PRN
Qty: 60 TABLET | Refills: 0 | COMMUNITY
Start: 2021-11-12

## 2021-11-12 RX ORDER — FLUCONAZOLE 200 MG/1
200 TABLET ORAL DAILY
Qty: 30 TABLET | Refills: 0 | COMMUNITY
Start: 2021-11-12 | End: 2022-01-28

## 2021-11-12 RX ADMIN — LIDOCAINE HYDROCHLORIDE 5 ML: 10 INJECTION, SOLUTION EPIDURAL; INFILTRATION; INTRACAUDAL; PERINEURAL at 13:28

## 2021-11-12 ASSESSMENT — PAIN SCALES - GENERAL: PAINLEVEL: NO PAIN (0)

## 2021-11-12 NOTE — NURSING NOTE
"Oncology Rooming Note    November 12, 2021 1:14 PM   Mil Seals is a 61 year old female who presents for:    Chief Complaint   Patient presents with     Oncology Clinic Visit     MULTIPLE MYELOMA     Initial Vitals: /77 (BP Location: Right arm, Patient Position: Sitting, Cuff Size: Adult Regular)   Pulse 113   Temp 98.5  F (36.9  C) (Oral)   Resp 16   Wt 74.9 kg (165 lb 3.2 oz)   SpO2 95%   BMI 26.55 kg/m   Estimated body mass index is 26.55 kg/m  as calculated from the following:    Height as of 10/22/21: 1.68 m (5' 6.14\").    Weight as of this encounter: 74.9 kg (165 lb 3.2 oz). Body surface area is 1.87 meters squared.  No Pain (0) Comment: Data Unavailable   No LMP recorded. Patient is postmenopausal.  Allergies reviewed: Yes  Medications reviewed: Yes    Medications: Medication refills not needed today.  Pharmacy name entered into [employer]: [id number] - [place], [place] - [address] [place] [address]    Clinical concerns: None.        Sushma Terrell CMA            "

## 2021-11-12 NOTE — DISCHARGE INSTRUCTIONS
A collaboration between [facility] and [facility]  Experts in minimally invasive, targeted treatments performed using imaging guidance    Tunneled Central Venous Catheter Removal    Today you had your existing tunneled central venous catheter removed because it was no longer needed for treatment.    Your catheter was removed by    After you go home:  - Avoid lying flat or bending at the waist for 2 hours following removal of the catheter to reduce risk of bleeding from catheter site.  - Keep any applied tape/gauze dressings clean and dry. Change tape/gauze dressings if they get wet or soiled.  - You may shower following the procedure, however cover and protect from moisture any tape/gauze dressings.   - You may remove tape/gauze dressings after 3 days if the site looks like it is in the process of healing or scabbing over.  - Avoid strenuous activities (elevating heart rate) or lifting more than 10 pounds for the next 3 days. Walking, elliptical and golf are examples of acceptable activities.  - If there is bleeding or oozing from the procedure site, apply firm pressure to the area above your collar bone (where the catheter entered the bloodstream) for 10 minutes.  If the bleeding continues, continue to hold pressure and seek medical attention at the phone numbers below.  - Mild procedure site discomfort can be treated with an ice pack and over-the-counter pain relievers.           Call our Interventional Radiology (IR) service if:  - If you start bleeding from the procedure site. Our radiology provider can help you decide if you need to return to the hospital.  - If you have new or worsening pain related to the procedure.  - If you have concerning swelling at the procedure site.  - If you develop hives or a rash or any unexplained itching.  - If you have a fever of greater than 100.5  F and chills in the first 5 days after procedure.  - Any other concerns  related to your procedure.    Contact Number:  [phone]  ([facility] control desk)  - Monday - Friday 8:00 am - 4:30 pm    After hours for urgent concerns:  [phone]  - After 4:30 pm Monday - Friday, Weekends and Holidays.   - Ask for Interventional Radiology on-call.  Someone is available 24 hours a day.  - [employer] toll free number:  [phone]

## 2021-11-12 NOTE — LETTER
11/12/2021         RE: Mil Seals  [address]  [facility] [address]      /77 (BP Location: Right arm, Patient Position: Sitting, Cuff Size: Adult Regular)   Pulse 113   Temp 98.5  F (36.9  C) (Oral)   Resp 16   Wt 74.9 kg (165 lb 3.2 oz)   SpO2 95%   BMI 26.55 kg/m    .  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  Her appetite has improved substantially but she still has occasional nausea.  She was stronger and but the last few days feels a bit weak and a bit wobbly.  She has had no headaches or visual changes. She has no neuropathy symptoms.  She has had no fever chills rash bleeding bruising respiratory cardiac or ENT symptoms.  The rest of her review of systems is quite negative.    Her exam shows her weight is stable and her vitals are acceptable.  She has no focal bone tenderness at any site.  People accompanying her raised a suggestion whether the manubrial mass is a bit odom, she does not think so.   It is not movable or tender and there is no inflammatory changes on the skin.  There are no other areas of bone discomfort.  Her lungs are clear and her heart tones are regular there is no gallop rhythm.  Her abdomen is soft and nontender.  She has no peripheral edema or skin rash.  Her oropharynx is clear without mucosal changes and my exam of her thyroid shows no palpable nodules even though nodular disease was recognized on a PET scan in September.    Laboratory testing showed good blood counts and chemistries.  There is no increase in monoclonal protein but still tiny IgG kappa is recognized by immunofixation.    We discussed the next few steps in her recovery.    First she is having her right atrial catheter removed today.      Second in about a month  she could have a flu shot and sometime after that it would be reasonable to give her a third booster Covid vaccine.    She could also receive Shingrix vaccine: 2 doses about 2 months apart.    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  Both are well-tolerated and have demonstrable benefit in controlling myeloma.  The maintenance therapy would be recommended to continue indefinitely.    She has chosen to have her thyroid nodule evaluated at [facility] by an endocrinologist closer to her home and I will leave that to evaluation to be scheduled by her outside physicians. I reminded her that this is not an urgent evaluation.    We did ask her to return at day 100 for bone marrow biopsy and repeat imaging for more complete post transplant staging.    Her questions were answered in full and we reviewed some of her medications that could be dropped from her list.    Overall it is good to see how well she is doing and recovering satisfactorily from her autotransplant.    Juan David Meza MD    Professor of medicine    Results for MATT DU (MRN [id number]) as of 11/12/2021 13:10   Ref. Range 11/10/2021 11:33 11/10/2021 11:34   Sodium Latest Ref Range: 133 - 144 mmol/L 142    Potassium Latest Ref Range: 3.4 - 5.3 mmol/L 4.1    Chloride Latest Ref Range: 94 - 109 mmol/L 110 (H)    Carbon Dioxide Latest Ref Range: 20 - 32 mmol/L 28    Urea Nitrogen Latest Ref Range: 7 - 30 mg/dL 10    Creatinine Latest Ref Range: 0.52 - 1.04 mg/dL 0.53    GFR Estimate Latest Ref Range: >60 mL/min/1.73m2 >90    Calcium Latest Ref Range: 8.5 - 10.1 mg/dL 8.5    Anion Gap Latest Ref Range: 3 - 14 mmol/L 4    Magnesium Latest Ref Range: 1.6 - 2.3 mg/dL 2.0    Phosphorus Latest Ref  Range: 2.5 - 4.5 mg/dL 3.7    Albumin Latest Ref Range: 3.4 - 5.0 g/dL 3.0 (L)    Protein Total Latest Ref Range: 6.8 - 8.8 g/dL 5.8 (L)    Bilirubin Total Latest Ref Range: 0.2 - 1.3 mg/dL 0.2    Alkaline Phosphatase Latest Ref Range: 40 - 150 U/L 67    ALT Latest Ref Range: 0 - 50 U/L 23    AST Latest Ref Range: 0 - 45 U/L 14    Lactate Dehydrogenase Latest Ref Range: 81 - 234 U/L 242 (H)    Uric Acid Latest Ref Range: 2.6 - 6.0 mg/dL 3.4    Glucose Latest Ref Range: 70 - 99 mg/dL 104 (H)    WBC Latest Ref Range: 4.0 - 11.0 10e3/uL 6.7    Hemoglobin Latest Ref Range: 11.7 - 15.7 g/dL 10.2 (L)    Hematocrit Latest Ref Range: 35.0 - 47.0 % 31.2 (L)    Platelet Count Latest Ref Range: 150 - 450 10e3/uL 271    RBC Count Latest Ref Range: 3.80 - 5.20 10e6/uL 3.33 (L)    MCV Latest Ref Range: 78 - 100 fL 94    MCH Latest Ref Range: 26.5 - 33.0 pg 30.6    MCHC Latest Ref Range: 31.5 - 36.5 g/dL 32.7    RDW Latest Ref Range: 10.0 - 15.0 % 16.4 (H)    % Neutrophils Latest Units: % 44    % Lymphocytes Latest Units: % 29    % Monocytes Latest Units: % 16    % Eosinophils Latest Units: % 9    % Basophils Latest Units: % 2    Absolute Basophils Latest Ref Range: 0.0 - 0.2 10e3/uL 0.1    Absolute Eosinophils Latest Ref Range: 0.0 - 0.7 10e3/uL 0.6    Absolute Immature Granulocytes Latest Ref Range: <=0.0 10e3/uL 0.0    Absolute Lymphocytes Latest Ref Range: 0.8 - 5.3 10e3/uL 2.0    Absolute Monocytes Latest Ref Range: 0.0 - 1.3 10e3/uL 1.1    % Immature Granulocytes Latest Units: % 0    Absolute Neutrophils Latest Ref Range: 1.6 - 8.3 10e3/uL 3.0    Absolute NRBCs Latest Units: 10e3/uL 0.0    NRBCs per 100 WBC Latest Ref Range: <1 /100 0    SARS CoV2 PCR Latest Ref Range: Negative, Testing sent to reference lab. Results will be returned via unsolicited result   Negative   Albumin Fraction Latest Ref Range: 3.7 - 5.1 g/dL 3.5 (L)    Alpha 1 Fraction Latest Ref Range: 0.2 - 0.4 g/dL 0.3    Alpha 2 Fraction Latest Ref Range: 0.5 -  0.9 g/dL 0.8    Beta Fraction Latest Ref Range: 0.6 - 1.0 g/dL 0.6    ELP Interpretation: Unknown One and possibly ...    Gamma Fraction Latest Ref Range: 0.7 - 1.6 g/dL 0.2 (L)    IGA Latest Ref Range: 84 - 499 mg/dL 6 (L)    IGG Latest Ref Range: 610-1,616 mg/dL 177 (L)    IGM Latest Ref Range: 35 - 242 mg/dL <10 (L)    Immunofixation ELP Unknown Very small monocl...    Monoclonal Peak Latest Ref Range: <=0.0 g/dL 0.1 (H)    Kappa Free Light Chains Latest Ref Range: 0.33 - 1.94 mg/dL 0.07 (L)    LAMBDA FREE LT CHAINS Latest Ref Range: 0.57 - 2.63 mg/dL <0.15 (L)    KAPPA/LAMBDA RATIO Unknown See Comment    Total Protein Serum for ELP Latest Ref Range: 6.8 - 8.8 g/dL 5.5 (L)      11/10/21 1133    Result status: Final   Resulting lab: [facility]   Value: Very small monoclonal IgG immunoglobulin of kappa light chain type. Monoclonal antibody therapeutics (e.g. Daratumumab) may appear as monoclonal proteins on serum electrophoresis and immunofixation, almost always as very small IgG kappa monoclonals. Thus, results should be interpreted with caution if the patient is receiving monoclonal antibody therapy. Also a possible small monoclonal IgG immunoglobulin of lambda light chain type. Pathological significance requires clinical correlation. TYLER Baxter M.D., Ph.D., Pathologist ([phone])     11/10/21 1133    Result status: Final   Resulting lab: [facility]   Value: One and possibly a second very small monoclonal protein (each about 0.1 g/dL or less) seen in the gamma fraction. See immunofixation report on same specimen. Slight hypoalbumeinemia and marked hypogammaglobulinemia. Pathologic significance requires   clinical correlation. TYLER Baxter M.D., Ph.D., Pathologist ().       BMT DOM

## 2021-11-12 NOTE — PROGRESS NOTES
/77 (BP Location: Right arm, Patient Position: Sitting, Cuff Size: Adult Regular)   Pulse 113   Temp 98.5  F (36.9  C) (Oral)   Resp 16   Wt 74.9 kg (165 lb 3.2 oz)   SpO2 95%   BMI 26.55 kg/m    .  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  Her appetite has improved substantially but she still has occasional nausea.  She was stronger and but the last few days feels a bit weak and a bit wobbly.  She has had no headaches or visual changes. She has no neuropathy symptoms.  She has had no fever chills rash bleeding bruising respiratory cardiac or ENT symptoms.  The rest of her review of systems is quite negative.    Her exam shows her weight is stable and her vitals are acceptable.  She has no focal bone tenderness at any site.  People accompanying her raised a suggestion whether the manubrial mass is a bit odom, she does not think so.   It is not movable or tender and there is no inflammatory changes on the skin.  There are no other areas of bone discomfort.  Her lungs are clear and her heart tones are regular there is no gallop rhythm.  Her abdomen is soft and nontender.  She has no peripheral edema or skin rash.  Her oropharynx is clear without mucosal changes and my exam of her thyroid shows no palpable nodules even though nodular disease was recognized on a PET scan in September.    Laboratory testing showed good blood counts and chemistries.  There is no increase in monoclonal protein but still tiny IgG kappa is recognized by immunofixation.    We discussed the next few steps in her recovery.    First she is having her right atrial catheter removed today.      Second in about a month she could have a flu shot and sometime after that it would be reasonable to give her  a third booster Covid vaccine.    She could also receive Shingrix vaccine: 2 doses about 2 months apart.    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  Both are well-tolerated and have demonstrable benefit in controlling myeloma.  The maintenance therapy would be recommended to continue indefinitely.    [facility] closer to her home and I will leave that to evaluation to be scheduled by her outside physicians. I reminded her that this is not an urgent evaluation.    We did ask her to return at day 100 for bone marrow biopsy and repeat imaging for more complete post transplant staging.    Her questions were answered in full and we reviewed some of her medications that could be dropped from her list.    Overall it is good to see how well she is doing and recovering satisfactorily from her autotransplant.    Juan David Meza MD    Professor of medicine    Results for MATT DU (MRN [id number]) as of 11/12/2021 13:10   Ref. Range 11/10/2021 11:33 11/10/2021 11:34   Sodium Latest Ref Range: 133 - 144 mmol/L 142    Potassium Latest Ref Range: 3.4 - 5.3 mmol/L 4.1    Chloride Latest Ref Range: 94 - 109 mmol/L 110 (H)    Carbon Dioxide Latest Ref Range: 20 - 32 mmol/L 28    Urea Nitrogen Latest Ref Range: 7 - 30 mg/dL 10    Creatinine Latest Ref Range: 0.52 - 1.04 mg/dL 0.53    GFR Estimate Latest Ref Range: >60 mL/min/1.73m2 >90    Calcium Latest Ref Range: 8.5 - 10.1 mg/dL 8.5    Anion Gap Latest Ref Range: 3 - 14 mmol/L 4    Magnesium Latest Ref Range: 1.6 - 2.3 mg/dL 2.0    Phosphorus Latest Ref Range: 2.5 - 4.5 mg/dL 3.7    Albumin Latest Ref Range: 3.4 - 5.0 g/dL 3.0 (L)     Protein Total Latest Ref Range: 6.8 - 8.8 g/dL 5.8 (L)    Bilirubin Total Latest Ref Range: 0.2 - 1.3 mg/dL 0.2    Alkaline Phosphatase Latest Ref Range: 40 - 150 U/L 67    ALT Latest Ref Range: 0 - 50 U/L 23    AST Latest Ref Range: 0 - 45 U/L 14    Lactate Dehydrogenase Latest Ref Range: 81 - 234 U/L 242 (H)    Uric Acid Latest Ref Range: 2.6 - 6.0 mg/dL 3.4    Glucose Latest Ref Range: 70 - 99 mg/dL 104 (H)    WBC Latest Ref Range: 4.0 - 11.0 10e3/uL 6.7    Hemoglobin Latest Ref Range: 11.7 - 15.7 g/dL 10.2 (L)    Hematocrit Latest Ref Range: 35.0 - 47.0 % 31.2 (L)    Platelet Count Latest Ref Range: 150 - 450 10e3/uL 271    RBC Count Latest Ref Range: 3.80 - 5.20 10e6/uL 3.33 (L)    MCV Latest Ref Range: 78 - 100 fL 94    MCH Latest Ref Range: 26.5 - 33.0 pg 30.6    MCHC Latest Ref Range: 31.5 - 36.5 g/dL 32.7    RDW Latest Ref Range: 10.0 - 15.0 % 16.4 (H)    % Neutrophils Latest Units: % 44    % Lymphocytes Latest Units: % 29    % Monocytes Latest Units: % 16    % Eosinophils Latest Units: % 9    % Basophils Latest Units: % 2    Absolute Basophils Latest Ref Range: 0.0 - 0.2 10e3/uL 0.1    Absolute Eosinophils Latest Ref Range: 0.0 - 0.7 10e3/uL 0.6    Absolute Immature Granulocytes Latest Ref Range: <=0.0 10e3/uL 0.0    Absolute Lymphocytes Latest Ref Range: 0.8 - 5.3 10e3/uL 2.0    Absolute Monocytes Latest Ref Range: 0.0 - 1.3 10e3/uL 1.1    % Immature Granulocytes Latest Units: % 0    Absolute Neutrophils Latest Ref Range: 1.6 - 8.3 10e3/uL 3.0    Absolute NRBCs Latest Units: 10e3/uL 0.0    NRBCs per 100 WBC Latest Ref Range: <1 /100 0    SARS CoV2 PCR Latest Ref Range: Negative, Testing sent to reference lab. Results will be returned via unsolicited result   Negative   Albumin Fraction Latest Ref Range: 3.7 - 5.1 g/dL 3.5 (L)    Alpha 1 Fraction Latest Ref Range: 0.2 - 0.4 g/dL 0.3    Alpha 2 Fraction Latest Ref Range: 0.5 - 0.9 g/dL 0.8    Beta Fraction Latest Ref Range: 0.6 - 1.0 g/dL 0.6    ELP  Interpretation: Unknown One and possibly ...    Gamma Fraction Latest Ref Range: 0.7 - 1.6 g/dL 0.2 (L)    IGA Latest Ref Range: 84 - 499 mg/dL 6 (L)    IGG Latest Ref Range: 610-1,616 mg/dL 177 (L)    IGM Latest Ref Range: 35 - 242 mg/dL <10 (L)    Immunofixation ELP Unknown Very small monocl...    Monoclonal Peak Latest Ref Range: <=0.0 g/dL 0.1 (H)    Kappa Free Light Chains Latest Ref Range: 0.33 - 1.94 mg/dL 0.07 (L)    LAMBDA FREE LT CHAINS Latest Ref Range: 0.57 - 2.63 mg/dL <0.15 (L)    KAPPA/LAMBDA RATIO Unknown See Comment    Total Protein Serum for ELP Latest Ref Range: 6.8 - 8.8 g/dL 5.5 (L)      11/10/21 1133    Result status: Final   Resulting lab: [facility]   Value: Very small monoclonal IgG immunoglobulin of kappa light chain type. Monoclonal antibody therapeutics (e.g. Daratumumab) may appear as monoclonal proteins on serum electrophoresis and immunofixation, almost always as very small IgG kappa monoclonals. Thus, results should be interpreted with caution if the patient is receiving monoclonal antibody therapy. Also a possible small monoclonal IgG immunoglobulin of lambda light chain type. Pathological significance requires clinical correlation. TYLER Baxter M.D., Ph.D., Pathologist ([phone])     11/10/21 1133    Result status: Final   Resulting lab: [facility]   Value: One and possibly a second very small monoclonal protein (each about 0.1 g/dL or less) seen in the gamma fraction. See immunofixation report on same specimen. Slight hypoalbumeinemia and marked hypogammaglobulinemia. Pathologic significance requires   clinical correlation. TYLER Baxter M.D., Ph.D., Pathologist ().

## 2021-11-12 NOTE — LETTER
11/12/2021         RE: Mil Seals  [address]  [facility] [address]        Dear Colleague,    Thank you for referring your patient, Mil Seals, to the [facility] BLOOD AND MARROW TRANSPLANT PROGRAM [place]. Please see a copy of my visit note below.    /77 (BP Location: Right arm, Patient Position: Sitting, Cuff Size: Adult Regular)   Pulse 113   Temp 98.5  F (36.9  C) (Oral)   Resp 16   Wt 74.9 kg (165 lb 3.2 oz)   SpO2 95%   BMI 26.55 kg/m    .  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  Her appetite has improved substantially but she still has occasional nausea.  She was stronger and but the last few days feels a bit weak and a bit wobbly.  She has had no headaches or visual changes. She has no neuropathy symptoms.  She has had no fever chills rash bleeding bruising respiratory cardiac or ENT symptoms.  The rest of her review of systems is quite negative.    Her exam shows her weight is stable and her vitals are acceptable.  She has no focal bone tenderness at any site.  People accompanying her raised a suggestion whether the manubrial mass is a bit odom, she does not think so.   It is not movable or tender and there is no inflammatory changes on the skin.  There are no other areas of bone discomfort.  Her lungs are clear and her heart tones are regular there is no gallop rhythm.  Her abdomen is soft and nontender.  She has no peripheral edema or skin rash.  Her oropharynx is clear without mucosal changes and my exam of her thyroid shows no palpable nodules even though nodular disease was recognized on a PET scan in September.    Laboratory testing showed good blood counts and chemistries.  There is no increase in monoclonal protein  but still tiny IgG kappa is recognized by immunofixation.    We discussed the next few steps in her recovery.    First she is having her right atrial catheter removed today.      Second in about a month she could have a flu shot and sometime after that it would be reasonable to give her a third booster Covid vaccine.    She could also receive Shingrix vaccine: 2 doses about 2 months apart.    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  Both are well-tolerated and have demonstrable benefit in controlling myeloma.  The maintenance therapy would be recommended to continue indefinitely.    She has chosen to have her thyroid nodule evaluated at [facility] by an endocrinologist closer to her home and I will leave that to evaluation to be scheduled by her outside physicians. I reminded her that this is not an urgent evaluation.    We did ask her to return at day 100 for bone marrow biopsy and repeat imaging for more complete post transplant staging.    Her questions were answered in full and we reviewed some of her medications that could be dropped from her list.    Overall it is good to see how well she is doing and recovering satisfactorily from her autotransplant.    Juan David Meza MD    Professor of medicine    Results for MATT DU (MRN [id number]) as of 11/12/2021 13:10   Ref. Range 11/10/2021 11:33 11/10/2021 11:34   Sodium Latest Ref Range: 133 - 144 mmol/L 142    Potassium Latest Ref Range: 3.4 - 5.3 mmol/L 4.1    Chloride Latest Ref Range: 94 - 109 mmol/L 110 (H)    Carbon Dioxide Latest Ref Range: 20 - 32 mmol/L 28    Urea Nitrogen Latest Ref Range: 7 - 30 mg/dL 10    Creatinine Latest Ref Range: 0.52 - 1.04 mg/dL 0.53    GFR Estimate Latest Ref  Range: >60 mL/min/1.73m2 >90    Calcium Latest Ref Range: 8.5 - 10.1 mg/dL 8.5    Anion Gap Latest Ref Range: 3 - 14 mmol/L 4    Magnesium Latest Ref Range: 1.6 - 2.3 mg/dL 2.0    Phosphorus Latest Ref Range: 2.5 - 4.5 mg/dL 3.7    Albumin Latest Ref Range: 3.4 - 5.0 g/dL 3.0 (L)    Protein Total Latest Ref Range: 6.8 - 8.8 g/dL 5.8 (L)    Bilirubin Total Latest Ref Range: 0.2 - 1.3 mg/dL 0.2    Alkaline Phosphatase Latest Ref Range: 40 - 150 U/L 67    ALT Latest Ref Range: 0 - 50 U/L 23    AST Latest Ref Range: 0 - 45 U/L 14    Lactate Dehydrogenase Latest Ref Range: 81 - 234 U/L 242 (H)    Uric Acid Latest Ref Range: 2.6 - 6.0 mg/dL 3.4    Glucose Latest Ref Range: 70 - 99 mg/dL 104 (H)    WBC Latest Ref Range: 4.0 - 11.0 10e3/uL 6.7    Hemoglobin Latest Ref Range: 11.7 - 15.7 g/dL 10.2 (L)    Hematocrit Latest Ref Range: 35.0 - 47.0 % 31.2 (L)    Platelet Count Latest Ref Range: 150 - 450 10e3/uL 271    RBC Count Latest Ref Range: 3.80 - 5.20 10e6/uL 3.33 (L)    MCV Latest Ref Range: 78 - 100 fL 94    MCH Latest Ref Range: 26.5 - 33.0 pg 30.6    MCHC Latest Ref Range: 31.5 - 36.5 g/dL 32.7    RDW Latest Ref Range: 10.0 - 15.0 % 16.4 (H)    % Neutrophils Latest Units: % 44    % Lymphocytes Latest Units: % 29    % Monocytes Latest Units: % 16    % Eosinophils Latest Units: % 9    % Basophils Latest Units: % 2    Absolute Basophils Latest Ref Range: 0.0 - 0.2 10e3/uL 0.1    Absolute Eosinophils Latest Ref Range: 0.0 - 0.7 10e3/uL 0.6    Absolute Immature Granulocytes Latest Ref Range: <=0.0 10e3/uL 0.0    Absolute Lymphocytes Latest Ref Range: 0.8 - 5.3 10e3/uL 2.0    Absolute Monocytes Latest Ref Range: 0.0 - 1.3 10e3/uL 1.1    % Immature Granulocytes Latest Units: % 0    Absolute Neutrophils Latest Ref Range: 1.6 - 8.3 10e3/uL 3.0    Absolute NRBCs Latest Units: 10e3/uL 0.0    NRBCs per 100 WBC Latest Ref Range: <1 /100 0    SARS CoV2 PCR Latest Ref Range: Negative, Testing sent to reference lab. Results will be  returned via unsolicited result   Negative   Albumin Fraction Latest Ref Range: 3.7 - 5.1 g/dL 3.5 (L)    Alpha 1 Fraction Latest Ref Range: 0.2 - 0.4 g/dL 0.3    Alpha 2 Fraction Latest Ref Range: 0.5 - 0.9 g/dL 0.8    Beta Fraction Latest Ref Range: 0.6 - 1.0 g/dL 0.6    ELP Interpretation: Unknown One and possibly ...    Gamma Fraction Latest Ref Range: 0.7 - 1.6 g/dL 0.2 (L)    IGA Latest Ref Range: 84 - 499 mg/dL 6 (L)    IGG Latest Ref Range: 610-1,616 mg/dL 177 (L)    IGM Latest Ref Range: 35 - 242 mg/dL <10 (L)    Immunofixation ELP Unknown Very small monocl...    Monoclonal Peak Latest Ref Range: <=0.0 g/dL 0.1 (H)    Kappa Free Light Chains Latest Ref Range: 0.33 - 1.94 mg/dL 0.07 (L)    LAMBDA FREE LT CHAINS Latest Ref Range: 0.57 - 2.63 mg/dL <0.15 (L)    KAPPA/LAMBDA RATIO Unknown See Comment    Total Protein Serum for ELP Latest Ref Range: 6.8 - 8.8 g/dL 5.5 (L)      11/10/21 1133    Result status: Final   Resulting lab: [facility]   Value: Very small monoclonal IgG immunoglobulin of kappa light chain type. Monoclonal antibody therapeutics (e.g. Daratumumab) may appear as monoclonal proteins on serum electrophoresis and immunofixation, almost always as very small IgG kappa monoclonals. Thus, results should be interpreted with caution if the patient is receiving monoclonal antibody therapy. Also a possible small monoclonal IgG immunoglobulin of lambda light chain type. Pathological significance requires clinical correlation. TYLER Baxter M.D., Ph.D., Pathologist ([phone])     11/10/21 1133    Result status: Final   Resulting lab: [facility]   Value: One and possibly a second very small monoclonal protein (each about 0.1 g/dL or less) seen in the gamma fraction. See immunofixation report on same specimen. Slight hypoalbumeinemia and marked hypogammaglobulinemia. Pathologic significance requires   clinical correlation. TYLER Baxter M.D., Ph.D., Pathologist ().    BMT DOM

## 2021-11-23 NOTE — TELEPHONE ENCOUNTER
RECORDS RECEIVED FROM: Internal   DATE RECEIVED: 11.29.21  Diagnoses: bilat thyroid nodules   NOTES (FOR ALL VISITS) STATUS DETAILS   OFFICE NOTES from referring provider Internal 9.28.21 CODY Joyner BMT   OFFICE NOTES from other specialist N/A    ED NOTES N/A    OPERATIVE REPORT  (thyroid, pituitary, adrenal, parathyroid) N/A    MEDICATION LIST Internal    IMAGING      DEXASCAN N/A    MRI (BRAIN) N/A    XR (Chest) Internal 9.22.21 chest   CT (HEAD/NECK/CHEST/ABDOMEN) PACS   6.12.20 Ct chest ab pelvis, [facility]   NUCLEAR  N/A    ULTRASOUND (HEAD/NECK) Internal 9.24.21 thyroid   LABS     DIABETES: HBGA1C, CREATININE, FASTING LIPIDS, MICROALBUMIN URINE, POTASSIUM, TSH, T4    THYROID: TSH, T4, CBC, THYRODLONULIN, TOTAL T3, FREE T4, CALCITONIN, CEA Internal              Action 11.23.21 MJ   Action Taken Requested image from [facility]     Action 11.26.21 MJ   Action Taken Sent 2nd request to [facility]     Action 11.29.21 MJ   Action Taken Called [facility] [phone] transferred to film room @ [phone]. They pushed wrong image. Called back and they will push correct one.  Correct image has been pushed, however unable to attached our MRN to image.  Called film room   Spoke with Nazario- stated 7.9.20 image is from 6.12.20 from [facility]- which is already in pt's chart.

## 2021-11-26 ENCOUNTER — CARE COORDINATION (OUTPATIENT)
Dept: TRANSPLANT | Facility: CLINIC | Age: 61
End: 2021-11-26
Payer: MEDICAID

## 2021-11-26 NOTE — PROGRESS NOTES
BMT CLINICAL SOCIAL WORK NOTE:    Focus: Resources    Data: Pt is a 62 y/o female s/p auto transplant, day +42 , for a history of MM    Interventions: Clinical  (CSW) was requested to help the pt send in her lodging reimbursement to [place]. TYRONE called [place] and was informed that lodging request need to be faxed to [phone] with the trip lodge and proof of expenses. All information was faxed to [place]. Pt had also dropped of her qian application in clinic, although when SW went to get them they could not be found. Pt resubmitted applications for the BMF and F. Both applications were submitted for the pt. SW provided update to the pt and Lori via email (preferred form of communication).CSW encouraged Pt to contact CSW for support, questions and/or resources.     Assessment: Pt presented as friendly and open. A bit frustrated that her grants were lost in the clinic.      Plan: CSW will continue to work with Pt and family to provide supportive counseling and assist with resources as needed. CSW will continue to collaborate with multidisciplinary team regarding Pt's plan of care.     JEMIMA Lyn, [facility]  Pager: [phone]  Phone: [phone]

## 2021-11-27 ENCOUNTER — TELEPHONE (OUTPATIENT)
Dept: TRANSPLANT | Facility: CLINIC | Age: 61
End: 2021-11-27
Payer: MEDICAID

## 2021-11-27 NOTE — TELEPHONE ENCOUNTER
BMT CLINICAL SOCIAL WORK NOTE:    Focus: [facility] Reimbursement    Data: Pt is a 61 year old female s/p auto BMT for her MM    Interventions: Clinical  (CSW) faxed all documents for her stay to [facility] ([phone]) for her mileage and lodging for the dates 10/22/21-10/31/21. [place] dates of 11/1/21-11/13/21 were faxed on 11/26/21. Pt updated regarding this information           JEMIMA Lyn, [facility]  Pager: [phone]  Phone: [phone]

## 2021-11-29 ENCOUNTER — PRE VISIT (OUTPATIENT)
Dept: ENDOCRINOLOGY | Facility: CLINIC | Age: 61
End: 2021-11-29

## 2021-12-03 ENCOUNTER — TELEPHONE (OUTPATIENT)
Dept: TRANSPLANT | Facility: CLINIC | Age: 61
End: 2021-12-03
Payer: MEDICAID

## 2021-12-03 NOTE — TELEPHONE ENCOUNTER
Clinical   Blood and Marrow Transplant Service    Reason for Intervention: SW received a vm from pt regarding MTM reimbursement.    Data: Pt is a 61 year old female day +49 s/p auto for MM.     Intervention: SW received a vm from pt regarding MTM reimbursement. Pt stated in her VM that she needs a document signed for her MTM reimbursement. SW called pt (P: [phone]) back and left a voice message. SW provided this SW contact information and explained SW is happy to help with her reimbursement paperwork. SW will await call back.    Education Provided: SW Contact Information    Follow-up Required: SW will await pt call back.    Encouraged patient/family to reach out as questions or concerns arise.     JEMIMA Avery, [facility]  Phone: [phone]  Pager: [phone]

## 2021-12-08 DIAGNOSIS — C90.00 MULTIPLE MYELOMA, REMISSION STATUS UNSPECIFIED (H): Primary | ICD-10-CM

## 2021-12-08 DIAGNOSIS — Z94.84 H/O PERIPHERAL STEM CELL TRANSPLANT (H): ICD-10-CM

## 2021-12-27 DIAGNOSIS — Z11.59 ENCOUNTER FOR SCREENING FOR OTHER VIRAL DISEASES: ICD-10-CM

## 2022-01-11 ENCOUNTER — CARE COORDINATION (OUTPATIENT)
Dept: TRANSPLANT | Facility: CLINIC | Age: 62
End: 2022-01-11
Payer: MEDICAID

## 2022-01-11 NOTE — PROGRESS NOTES
Order for PET scan faxed to [facility] for appointment on 1/22/22. Patient scheduled to have PCR COVID test 1/10/22

## 2022-01-13 ENCOUNTER — TELEPHONE (OUTPATIENT)
Dept: TRANSPLANT | Facility: CLINIC | Age: 62
End: 2022-01-13
Payer: MEDICAID

## 2022-01-13 DIAGNOSIS — C90.00 MULTIPLE MYELOMA, REMISSION STATUS UNSPECIFIED (H): Primary | ICD-10-CM

## 2022-01-13 NOTE — TELEPHONE ENCOUNTER
Contacted patient about upcoming anniversary testing.     Will send urine orders to local lab to allow for specimen container pickup.     Encouraged patient to move COVID testing appointment from 1/21 in PM to either AM same day or 1/20 as lab informed me they have a 3 day turn-a-round window and we must have result for her to be able to have sedated BMBX on 1/24. No anticoags to hold

## 2022-01-21 ENCOUNTER — ANESTHESIA EVENT (OUTPATIENT)
Dept: SURGERY | Facility: AMBULATORY SURGERY CENTER | Age: 62
End: 2022-01-21
Payer: MEDICAID

## 2022-01-24 ENCOUNTER — HOSPITAL ENCOUNTER (OUTPATIENT)
Facility: AMBULATORY SURGERY CENTER | Age: 62
End: 2022-01-24
Attending: PHYSICIAN ASSISTANT
Payer: MEDICAID

## 2022-01-24 ENCOUNTER — APPOINTMENT (OUTPATIENT)
Dept: LAB | Facility: CLINIC | Age: 62
End: 2022-01-24
Attending: PHYSICIAN ASSISTANT
Payer: MEDICAID

## 2022-01-24 ENCOUNTER — ONCOLOGY VISIT (OUTPATIENT)
Dept: TRANSPLANT | Facility: CLINIC | Age: 62
End: 2022-01-24
Attending: PHYSICIAN ASSISTANT
Payer: MEDICAID

## 2022-01-24 ENCOUNTER — ANESTHESIA (OUTPATIENT)
Dept: SURGERY | Facility: AMBULATORY SURGERY CENTER | Age: 62
End: 2022-01-24
Payer: MEDICAID

## 2022-01-24 VITALS
HEART RATE: 85 BPM | WEIGHT: 160.5 LBS | OXYGEN SATURATION: 98 % | SYSTOLIC BLOOD PRESSURE: 127 MMHG | BODY MASS INDEX: 25.79 KG/M2 | TEMPERATURE: 97.9 F | DIASTOLIC BLOOD PRESSURE: 73 MMHG | RESPIRATION RATE: 12 BRPM

## 2022-01-24 VITALS
OXYGEN SATURATION: 100 % | TEMPERATURE: 97 F | BODY MASS INDEX: 24.25 KG/M2 | HEIGHT: 68 IN | RESPIRATION RATE: 16 BRPM | DIASTOLIC BLOOD PRESSURE: 71 MMHG | SYSTOLIC BLOOD PRESSURE: 132 MMHG | HEART RATE: 78 BPM | WEIGHT: 160 LBS

## 2022-01-24 DIAGNOSIS — Z94.81 S/P BONE MARROW TRANSPLANT (H): ICD-10-CM

## 2022-01-24 DIAGNOSIS — Z94.84 H/O PERIPHERAL STEM CELL TRANSPLANT (H): ICD-10-CM

## 2022-01-24 DIAGNOSIS — C90.00 MULTIPLE MYELOMA, REMISSION STATUS UNSPECIFIED (H): ICD-10-CM

## 2022-01-24 DIAGNOSIS — C90.00 MULTIPLE MYELOMA, REMISSION STATUS UNSPECIFIED (H): Primary | ICD-10-CM

## 2022-01-24 DIAGNOSIS — R19.7 DIARRHEA OF PRESUMED INFECTIOUS ORIGIN: ICD-10-CM

## 2022-01-24 LAB
ALBUMIN SERPL-MCNC: 4.1 G/DL (ref 3.4–5)
ALP SERPL-CCNC: 52 U/L (ref 40–150)
ALT SERPL W P-5'-P-CCNC: 22 U/L (ref 0–50)
ANION GAP SERPL CALCULATED.3IONS-SCNC: 8 MMOL/L (ref 3–14)
AST SERPL W P-5'-P-CCNC: 14 U/L (ref 0–45)
BASOPHILS # BLD AUTO: 0 10E3/UL (ref 0–0.2)
BASOPHILS NFR BLD AUTO: 0 %
BILIRUB SERPL-MCNC: 0.2 MG/DL (ref 0.2–1.3)
BUN SERPL-MCNC: 12 MG/DL (ref 7–30)
CALCIUM SERPL-MCNC: 9.2 MG/DL (ref 8.5–10.1)
CD19 CELLS # BLD: 464 CELLS/UL (ref 107–698)
CD19 CELLS NFR BLD: 17 % (ref 6–27)
CD3 CELLS # BLD: 2123 CELLS/UL (ref 603–2990)
CD3 CELLS NFR BLD: 79 % (ref 49–84)
CD3+CD4+ CELLS # BLD: 650 CELLS/UL (ref 441–2156)
CD3+CD4+ CELLS NFR BLD: 24 % (ref 28–63)
CD3+CD4+ CELLS/CD3+CD8+ CLL BLD: 0.44 % (ref 1.4–2.6)
CD3+CD8+ CELLS # BLD: 1468 CELLS/UL (ref 125–1312)
CD3+CD8+ CELLS NFR BLD: 54 % (ref 10–40)
CD3-CD16+CD56+ CELLS # BLD: 91 CELLS/UL (ref 95–640)
CD3-CD16+CD56+ CELLS NFR BLD: 3 % (ref 4–25)
CHLORIDE BLD-SCNC: 108 MMOL/L (ref 94–109)
CO2 SERPL-SCNC: 27 MMOL/L (ref 20–32)
CREAT SERPL-MCNC: 0.73 MG/DL (ref 0.52–1.04)
EOSINOPHIL # BLD AUTO: 0.2 10E3/UL (ref 0–0.7)
EOSINOPHIL NFR BLD AUTO: 3 %
ERYTHROCYTE [DISTWIDTH] IN BLOOD BY AUTOMATED COUNT: 13.2 % (ref 10–15)
GFR SERPL CREATININE-BSD FRML MDRD: >90 ML/MIN/1.73M2
GLUCOSE BLD-MCNC: 97 MG/DL (ref 70–99)
HCT VFR BLD AUTO: 38.7 % (ref 35–47)
HGB BLD-MCNC: 12.4 G/DL (ref 11.7–15.7)
IGA SERPL-MCNC: <2 MG/DL (ref 84–499)
IGG SERPL-MCNC: 140 MG/DL (ref 610–1616)
IGM SERPL-MCNC: <10 MG/DL (ref 35–242)
IMM GRANULOCYTES # BLD: 0 10E3/UL
IMM GRANULOCYTES NFR BLD: 0 %
INR PPP: 0.95 (ref 0.85–1.15)
KAPPA LC FREE SER-MCNC: 0.11 MG/DL (ref 0.33–1.94)
KAPPA LC FREE/LAMBDA FREE SER NEPH: ABNORMAL {RATIO}
LAMBDA LC FREE SERPL-MCNC: <0.15 MG/DL (ref 0.57–2.63)
LDH SERPL L TO P-CCNC: 172 U/L (ref 81–234)
LYMPHOCYTES # BLD AUTO: 2.5 10E3/UL (ref 0.8–5.3)
LYMPHOCYTES NFR BLD AUTO: 35 %
MAGNESIUM SERPL-MCNC: 2.4 MG/DL (ref 1.6–2.3)
MCH RBC QN AUTO: 30.8 PG (ref 26.5–33)
MCHC RBC AUTO-ENTMCNC: 32 G/DL (ref 31.5–36.5)
MCV RBC AUTO: 96 FL (ref 78–100)
MONOCYTES # BLD AUTO: 0.8 10E3/UL (ref 0–1.3)
MONOCYTES NFR BLD AUTO: 11 %
NEUTROPHILS # BLD AUTO: 3.7 10E3/UL (ref 1.6–8.3)
NEUTROPHILS NFR BLD AUTO: 51 %
NRBC # BLD AUTO: 0 10E3/UL
NRBC BLD AUTO-RTO: 0 /100
PHOSPHATE SERPL-MCNC: 3.4 MG/DL (ref 2.5–4.5)
PLATELET # BLD AUTO: 214 10E3/UL (ref 150–450)
POTASSIUM BLD-SCNC: 4.3 MMOL/L (ref 3.4–5.3)
PROT SERPL-MCNC: 6.6 G/DL (ref 6.8–8.8)
RBC # BLD AUTO: 4.02 10E6/UL (ref 3.8–5.2)
SODIUM SERPL-SCNC: 143 MMOL/L (ref 133–144)
T CELL EXTENDED COMMENT: ABNORMAL
TOTAL PROTEIN SERUM FOR ELP: 6.5 G/DL (ref 6.8–8.8)
URATE SERPL-MCNC: 3.1 MG/DL (ref 2.6–6)
WBC # BLD AUTO: 7.2 10E3/UL (ref 4–11)

## 2022-01-24 PROCEDURE — 83735 ASSAY OF MAGNESIUM: CPT | Performed by: PHYSICIAN ASSISTANT

## 2022-01-24 PROCEDURE — 84100 ASSAY OF PHOSPHORUS: CPT | Performed by: PHYSICIAN ASSISTANT

## 2022-01-24 PROCEDURE — 88185 FLOWCYTOMETRY/TC ADD-ON: CPT | Performed by: PHYSICIAN ASSISTANT

## 2022-01-24 PROCEDURE — 88305 TISSUE EXAM BY PATHOLOGIST: CPT | Mod: TC | Performed by: PHYSICIAN ASSISTANT

## 2022-01-24 PROCEDURE — 88188 FLOWCYTOMETRY/READ 9-15: CPT | Performed by: STUDENT IN AN ORGANIZED HEALTH CARE EDUCATION/TRAINING PROGRAM

## 2022-01-24 PROCEDURE — 88311 DECALCIFY TISSUE: CPT | Mod: TC | Performed by: PHYSICIAN ASSISTANT

## 2022-01-24 PROCEDURE — 85025 COMPLETE CBC W/AUTO DIFF WBC: CPT | Performed by: PHYSICIAN ASSISTANT

## 2022-01-24 PROCEDURE — 80053 COMPREHEN METABOLIC PANEL: CPT | Performed by: PHYSICIAN ASSISTANT

## 2022-01-24 PROCEDURE — 38222 DX BONE MARROW BX & ASPIR: CPT | Mod: LT

## 2022-01-24 PROCEDURE — 85610 PROTHROMBIN TIME: CPT

## 2022-01-24 PROCEDURE — 83615 LACTATE (LD) (LDH) ENZYME: CPT | Performed by: PHYSICIAN ASSISTANT

## 2022-01-24 PROCEDURE — 84155 ASSAY OF PROTEIN SERUM: CPT | Mod: XU | Performed by: PHYSICIAN ASSISTANT

## 2022-01-24 PROCEDURE — 82784 ASSAY IGA/IGD/IGG/IGM EACH: CPT | Performed by: PHYSICIAN ASSISTANT

## 2022-01-24 PROCEDURE — 84550 ASSAY OF BLOOD/URIC ACID: CPT | Performed by: PHYSICIAN ASSISTANT

## 2022-01-24 PROCEDURE — 84165 PROTEIN E-PHORESIS SERUM: CPT | Mod: TC | Performed by: PATHOLOGY

## 2022-01-24 PROCEDURE — 36415 COLL VENOUS BLD VENIPUNCTURE: CPT | Performed by: PHYSICIAN ASSISTANT

## 2022-01-24 PROCEDURE — 83521 IG LIGHT CHAINS FREE EACH: CPT | Performed by: PHYSICIAN ASSISTANT

## 2022-01-24 PROCEDURE — G0463 HOSPITAL OUTPT CLINIC VISIT: HCPCS

## 2022-01-24 PROCEDURE — 86334 IMMUNOFIX E-PHORESIS SERUM: CPT | Performed by: PHYSICIAN ASSISTANT

## 2022-01-24 PROCEDURE — 82040 ASSAY OF SERUM ALBUMIN: CPT | Performed by: PHYSICIAN ASSISTANT

## 2022-01-24 PROCEDURE — 86355 B CELLS TOTAL COUNT: CPT | Mod: XU | Performed by: PHYSICIAN ASSISTANT

## 2022-01-24 RX ORDER — LIDOCAINE 40 MG/G
CREAM TOPICAL
Status: DISCONTINUED | OUTPATIENT
Start: 2022-01-24 | End: 2022-01-25 | Stop reason: HOSPADM

## 2022-01-24 RX ORDER — METOPROLOL TARTRATE 1 MG/ML
1-2 INJECTION, SOLUTION INTRAVENOUS EVERY 5 MIN PRN
Status: DISCONTINUED | OUTPATIENT
Start: 2022-01-24 | End: 2022-01-25 | Stop reason: HOSPADM

## 2022-01-24 RX ORDER — LIDOCAINE HYDROCHLORIDE 10 MG/ML
8-10 INJECTION, SOLUTION EPIDURAL; INFILTRATION; INTRACAUDAL; PERINEURAL
Status: DISCONTINUED | OUTPATIENT
Start: 2022-01-24 | End: 2022-01-25 | Stop reason: HOSPADM

## 2022-01-24 RX ORDER — FENTANYL CITRATE 50 UG/ML
25 INJECTION, SOLUTION INTRAMUSCULAR; INTRAVENOUS EVERY 5 MIN PRN
Status: DISCONTINUED | OUTPATIENT
Start: 2022-01-24 | End: 2022-01-25 | Stop reason: HOSPADM

## 2022-01-24 RX ORDER — GABAPENTIN 300 MG/1
300 CAPSULE ORAL
Status: DISCONTINUED | OUTPATIENT
Start: 2022-01-24 | End: 2022-01-25 | Stop reason: HOSPADM

## 2022-01-24 RX ORDER — ONDANSETRON 4 MG/1
4 TABLET, ORALLY DISINTEGRATING ORAL EVERY 30 MIN PRN
Status: DISCONTINUED | OUTPATIENT
Start: 2022-01-24 | End: 2022-01-25 | Stop reason: HOSPADM

## 2022-01-24 RX ORDER — ACETAMINOPHEN 325 MG/1
975 TABLET ORAL ONCE
Status: COMPLETED | OUTPATIENT
Start: 2022-01-24 | End: 2022-01-24

## 2022-01-24 RX ORDER — PROPOFOL 10 MG/ML
INJECTION, EMULSION INTRAVENOUS PRN
Status: DISCONTINUED | OUTPATIENT
Start: 2022-01-24 | End: 2022-01-24

## 2022-01-24 RX ORDER — SODIUM CHLORIDE, SODIUM LACTATE, POTASSIUM CHLORIDE, CALCIUM CHLORIDE 600; 310; 30; 20 MG/100ML; MG/100ML; MG/100ML; MG/100ML
INJECTION, SOLUTION INTRAVENOUS CONTINUOUS
Status: DISCONTINUED | OUTPATIENT
Start: 2022-01-24 | End: 2022-01-25 | Stop reason: HOSPADM

## 2022-01-24 RX ORDER — PROPOFOL 10 MG/ML
INJECTION, EMULSION INTRAVENOUS CONTINUOUS PRN
Status: DISCONTINUED | OUTPATIENT
Start: 2022-01-24 | End: 2022-01-24

## 2022-01-24 RX ORDER — MEPERIDINE HYDROCHLORIDE 25 MG/ML
12.5 INJECTION INTRAMUSCULAR; INTRAVENOUS; SUBCUTANEOUS
Status: DISCONTINUED | OUTPATIENT
Start: 2022-01-24 | End: 2022-01-25 | Stop reason: HOSPADM

## 2022-01-24 RX ORDER — FENTANYL CITRATE 50 UG/ML
25 INJECTION, SOLUTION INTRAMUSCULAR; INTRAVENOUS
Status: DISCONTINUED | OUTPATIENT
Start: 2022-01-24 | End: 2022-01-25 | Stop reason: HOSPADM

## 2022-01-24 RX ORDER — ONDANSETRON 2 MG/ML
4 INJECTION INTRAMUSCULAR; INTRAVENOUS EVERY 30 MIN PRN
Status: DISCONTINUED | OUTPATIENT
Start: 2022-01-24 | End: 2022-01-25 | Stop reason: HOSPADM

## 2022-01-24 RX ORDER — HYDRALAZINE HYDROCHLORIDE 20 MG/ML
2.5-5 INJECTION INTRAMUSCULAR; INTRAVENOUS EVERY 10 MIN PRN
Status: DISCONTINUED | OUTPATIENT
Start: 2022-01-24 | End: 2022-01-25 | Stop reason: HOSPADM

## 2022-01-24 RX ORDER — HYDROMORPHONE HYDROCHLORIDE 1 MG/ML
0.2 INJECTION, SOLUTION INTRAMUSCULAR; INTRAVENOUS; SUBCUTANEOUS EVERY 5 MIN PRN
Status: DISCONTINUED | OUTPATIENT
Start: 2022-01-24 | End: 2022-01-25 | Stop reason: HOSPADM

## 2022-01-24 RX ORDER — OXYCODONE HYDROCHLORIDE 5 MG/1
5 TABLET ORAL EVERY 4 HOURS PRN
Status: DISCONTINUED | OUTPATIENT
Start: 2022-01-24 | End: 2022-01-25 | Stop reason: HOSPADM

## 2022-01-24 RX ADMIN — PROPOFOL 50 MG: 10 INJECTION, EMULSION INTRAVENOUS at 12:39

## 2022-01-24 RX ADMIN — ACETAMINOPHEN 975 MG: 325 TABLET ORAL at 12:24

## 2022-01-24 RX ADMIN — SODIUM CHLORIDE, SODIUM LACTATE, POTASSIUM CHLORIDE, CALCIUM CHLORIDE: 600; 310; 30; 20 INJECTION, SOLUTION INTRAVENOUS at 12:23

## 2022-01-24 RX ADMIN — PROPOFOL 150 MCG/KG/MIN: 10 INJECTION, EMULSION INTRAVENOUS at 12:39

## 2022-01-24 ASSESSMENT — PAIN SCALES - GENERAL: PAINLEVEL: NO PAIN (0)

## 2022-01-24 ASSESSMENT — MIFFLIN-ST. JEOR: SCORE: 1334.26

## 2022-01-24 ASSESSMENT — LIFESTYLE VARIABLES: TOBACCO_USE: 1

## 2022-01-24 NOTE — ANESTHESIA POSTPROCEDURE EVALUATION
Patient: Mil Seals    Procedure: Procedure(s):  BIOPSY, BONE MARROW       Diagnosis:Multiple myeloma, remission status unspecified (H) [C90.00]  H/O peripheral stem cell transplant (H) [Z94.84]  Diagnosis Additional Information: No value filed.    Anesthesia Type:  MAC    Note:  Disposition: Outpatient   Postop Pain Control: Uneventful            Sign Out: Well controlled pain   PONV: No   Neuro/Psych: Uneventful            Sign Out: Acceptable/Baseline neuro status   Airway/Respiratory: Uneventful            Sign Out: Acceptable/Baseline resp. status   CV/Hemodynamics: Uneventful            Sign Out: Acceptable CV status; No obvious hypovolemia; No obvious fluid overload   Other NRE: NONE   DID A NON-ROUTINE EVENT OCCUR? No           Last vitals:  Vitals Value Taken Time   /71 01/24/22 1337   Temp 36.1  C (97  F) 01/24/22 1337   Pulse 78 01/24/22 1303   Resp 16 01/24/22 1337   SpO2         Electronically Signed By: Kahlil Friedman MD  January 24, 2022  2:02 PM

## 2022-01-24 NOTE — NURSING NOTE
"Oncology Rooming Note    January 24, 2022 10:51 AM   Chadjannette VILLANUEVA Josafat Seals is a 61 year old female who presents for:    Chief Complaint   Patient presents with     Blood Draw     Labs drawn via  by RN in lab. VS taken.     RECHECK     Pt is here for a rtn for S/P BMT for MM      Initial Vitals: Blood Pressure 127/73   Pulse 85   Temperature 97.9  F (36.6  C) (Tympanic)   Respiration 12   Weight 72.8 kg (160 lb 8 oz)   Oxygen Saturation 98%   Body Mass Index 25.79 kg/m   Estimated body mass index is 25.79 kg/m  as calculated from the following:    Height as of 10/22/21: 1.68 m (5' 6.14\").    Weight as of this encounter: 72.8 kg (160 lb 8 oz). Body surface area is 1.84 meters squared.  No Pain (0) Comment: Data Unavailable   No LMP recorded. Patient is postmenopausal.  Allergies reviewed: Yes  Medications reviewed: Yes    Medications: Medication refills not needed today.  Pharmacy name entered into [facility]: [id number] [place] - [address] [place] [address]    Clinical concerns: none       Gertrude Rubalcava MA            "

## 2022-01-24 NOTE — LETTER
2022         RE: Mil Seals  [address]  [facility] [address]        Dear Colleague,    Thank you for referring your patient, Mil Seals, to the [facility] BLOOD AND MARROW TRANSPLANT PROGRAM [place]. Please see a copy of my visit note below.    Patient Name: Mil Seals  Patient MRN: [id number]  Patient : 1960    Abbreviated H&P and Pre-sedation Assessment for bone marrow biopsy (procedure name) with sedation    Chief complaint and/or reason for Procedure: multiple myeloma    Planned level of sedation: Minimal - moderate sedation    History of problems with sedation: (patient or family hx): No    ASA Assessment Category: 2 - Mild systemic disease    History of sleep apnea: No    History of blood thinners: No     Appropriate NPO status: Yes; last oral intake around 10pm on     Current tobacco use: No    Any recent fever, cough (other than chronic cough from asthma), chest or sinus congestion, SOB (chronic OCHOA), weight loss, chest pain, or constipation.  She notes diarrhea over the past 24 hours; 3-4 times per day. Some cramping with it, but not painful otherwise.  Notes gas cramps, which is also unusual.  No blood in the stool.   Stool studies ordered 2022.    Medications   Current Outpatient Medications   Medication     acetaminophen (TYLENOL) 500 MG tablet     albuterol (ACCUNEB) 1.25 MG/3ML neb solution     albuterol (PROAIR HFA/PROVENTIL HFA/VENTOLIN HFA) 108 (90 Base) MCG/ACT inhaler     budesonide-formoterol (SYMBICORT) 160-4.5 MCG/ACT Inhaler     cholecalciferol (VITAMIN D3) 125 mcg (5000 units) capsule     fluconazole (DIFLUCAN) 200 MG tablet     heparin lock flush 10 UNIT/ML SOLN injection     LamoTRIgine (LAMICTAL XR) 300 MG 24 hr tablet     loratadine (CLARITIN) 10 MG tablet     OLANZapine (ZYPREXA) 2.5 MG tablet     ondansetron (ZOFRAN-ODT) 8 MG ODT tab     pantoprazole (PROTONIX) 40 MG EC tablet     prazosin (MINIPRESS) 1 MG capsule      valACYclovir (VALTREX) 500 MG tablet     No current facility-administered medications for this visit.         Home Med List)  (Not in a hospital admission)      Allergies  Acyclovir, Azithromycin, Codeine, Hydrocodone, Morphine, Mushroom, Penicillins, and Sulfa drugs    PMH:  No past medical history on file.    Past Surgical History:   Procedure Laterality Date     INSERT CATHETER VASCULAR ACCESS Right 10/4/2021    Procedure: DOUBLE LUMEN LARGE BORE APHARESIS CAPABLE CATHETERINSERTION, VASCULAR ACCESS @0800;  Surgeon: Clem Oreilly MD;  Location: [facility] OR     IR CVC TUNNEL PLACEMENT > 5 YRS OF AGE  10/4/2021     IR CVC TUNNEL REMOVAL RIGHT  11/12/2021       Focused Physical exam pertinent to procedure:          (Details of heart, lung, ASA assessment and mallampati assessment in pre procedure assessment flowsheet)  General- healthy,alert,no distress   Recent vital signs-  /73   Pulse 85   Temp 97.9  F (36.6  C) (Tympanic)   Resp 12   Wt 72.8 kg (160 lb 8 oz)   SpO2 98%   BMI 25.79 kg/m    HEART-regular rate and rhythm and no murmurs, gallops, or rub  LUNGS-Clear to Ausculation  OROPHARYNGEAL - no erythema, no lesions; no piercings, no loose teeth, no loose dental hardware.     A/P:Reviewed history, medications, allergies, clinical information and pre procedure assessment. The patient was informed of the risks and benefits of the procedure.  They would like to proceed.  Mil Seals is approved for use of sedation during their procedure as noted above.    Laly Ye PA-C          Again, thank you for allowing me to participate in the care of your patient.      Sincerely,    BMT Advanced Practice Provider

## 2022-01-24 NOTE — NURSING NOTE
Chief Complaint   Patient presents with     Blood Draw     Labs drawn via  by RN in lab. VS taken.     Labs drawn via venipuncture. Patient declined PIV due to wait time in between appointments. Declined urine collection, she will discuss with provider.  Vital signs taken. Checked into next appointment.     Marly Camarillo RN

## 2022-01-24 NOTE — PROGRESS NOTES
Patient Name: Mil Seals  Patient MRN: [id number]  Patient : 1960    Abbreviated H&P and Pre-sedation Assessment for bone marrow biopsy (procedure name) with sedation    Chief complaint and/or reason for Procedure: multiple myeloma    Planned level of sedation: Minimal - moderate sedation    History of problems with sedation: (patient or family hx): No    ASA Assessment Category: 2 - Mild systemic disease    History of sleep apnea: No    History of blood thinners: No     Appropriate NPO status: Yes; last oral intake around 10pm on     Current tobacco use: No    Any recent fever, cough (other than chronic cough from asthma), chest or sinus congestion, SOB (chronic OCHOA), weight loss, chest pain, or constipation.  She notes diarrhea over the past 24 hours; 3-4 times per day. Some cramping with it, but not painful otherwise.  Notes gas cramps, which is also unusual.  No blood in the stool.   Stool studies ordered 2022.    Medications   Current Outpatient Medications   Medication     acetaminophen (TYLENOL) 500 MG tablet     albuterol (ACCUNEB) 1.25 MG/3ML neb solution     albuterol (PROAIR HFA/PROVENTIL HFA/VENTOLIN HFA) 108 (90 Base) MCG/ACT inhaler     budesonide-formoterol (SYMBICORT) 160-4.5 MCG/ACT Inhaler     cholecalciferol (VITAMIN D3) 125 mcg (5000 units) capsule     fluconazole (DIFLUCAN) 200 MG tablet     heparin lock flush 10 UNIT/ML SOLN injection     LamoTRIgine (LAMICTAL XR) 300 MG 24 hr tablet     loratadine (CLARITIN) 10 MG tablet     OLANZapine (ZYPREXA) 2.5 MG tablet     ondansetron (ZOFRAN-ODT) 8 MG ODT tab     pantoprazole (PROTONIX) 40 MG EC tablet     prazosin (MINIPRESS) 1 MG capsule     valACYclovir (VALTREX) 500 MG tablet     No current facility-administered medications for this visit.         Home Med List)  (Not in a hospital admission)      Allergies  Acyclovir, Azithromycin, Codeine, Hydrocodone, Morphine, Mushroom, Penicillins, and Sulfa drugs    PMH:  No past  medical history on file.    Past Surgical History:   Procedure Laterality Date     INSERT CATHETER VASCULAR ACCESS Right 10/4/2021    Procedure: DOUBLE LUMEN LARGE BORE APHARESIS CAPABLE CATHETERINSERTION, VASCULAR ACCESS @0800;  Surgeon: Clem Oreilly MD;  Location: [facility] OR     IR CVC TUNNEL PLACEMENT > 5 YRS OF AGE  10/4/2021     IR CVC TUNNEL REMOVAL RIGHT  11/12/2021       Focused Physical exam pertinent to procedure:          (Details of heart, lung, ASA assessment and mallampati assessment in pre procedure assessment flowsheet)  General- healthy,alert,no distress   Recent vital signs-  /73   Pulse 85   Temp 97.9  F (36.6  C) (Tympanic)   Resp 12   Wt 72.8 kg (160 lb 8 oz)   SpO2 98%   BMI 25.79 kg/m    HEART-regular rate and rhythm and no murmurs, gallops, or rub  LUNGS-Clear to Ausculation  OROPHARYNGEAL - no erythema, no lesions; no piercings, no loose teeth, no loose dental hardware.     A/P:Reviewed history, medications, allergies, clinical information and pre procedure assessment. The patient was informed of the risks and benefits of the procedure.  They would like to proceed.  Mil VILLANUEVA Mauricerich Seals is approved for use of sedation during their procedure as noted above.    Laly Ye PA-C

## 2022-01-24 NOTE — DISCHARGE INSTRUCTIONS
How to Care for your Bone Marrow Biopsy    Activity  Relax and take it easy for the next 24 hours.   Resume regular activity after 24 hours.    Diet   Resume pre-procedure diet and drink plenty of fluids.    If you received sedation, you may feel a little nauseated so start with a clear liquid diet until the nausea passes.    Do Not Immerse Bone Marrow Biopsy Puncture Site in Water  Do not take a bath until the puncture site has healed.  Do not sit in a hot tub or spa until the puncture site has healed.  Do not swim until the puncture site has healed.  Wait 24 hours before taking a shower.    Drainage  Drainage should be minimal.  IF bleeding should occur and soaks through the dressing, lie down and put pressure on the puncture site.    IF bleeding persists, apply gentle pressure with your hand over the dressing for 5 minutes.    IF the pressure doesn't stop the bleeding, contact your provider immediately.    Dressing  Keep the dressing dry and in place for 24 hours, unless instructed otherwise.    IF bleeding soaks through the dressing in the first 24 hours do NOT remove the dressing as you may pull off any scab that has formed.  Instead, reinforce the dressing with extra gauze and tape.    No Alcohol  Do not drink alcoholic beverages for the next 24 hours.    No Driving or Operating Machinery  No driving or operating machinery for the next 24 hours.    Notify your provider IF:    Excessive bleeding or drainage at the puncture site    Excessive swelling, redness or tenderness at the puncture site    Fever above 100.5 degrees taken orally    Severe pain    Drainage that is green, yellow, thick white or has a bad odor    Telephone Numbers  Bone Marrow transplant clinic:  [phone] (Monday thru Friday, 8:00 am to 4:00 pm)  After business hours call the [facility]:  [phone] and ask for the Hematology/BMT doctor on call.  Or call the Emergency Room at the [facility]  [facility]:  [phone].        [facility]  Home Care Following Anesthesia  For 24 hours after surgery:  1. Get plenty of rest.  A responsible adult must stay with you for at least 24 hours after you leave the surgery center.  2. Do not drive or use heavy equipment.  If you have weakness or tingling, don't drive or use heavy equipment until this feeling goes away.   3. Do not drink alcohol.   4. Avoid strenuous or risky activities.  Ask for help when climbing stairs.  5. You may feel lightheaded.  IF so, sit for a few minutes before standing.  Have someone help you get up.   6. If you have nausea (feel sick to your stomach): Drink only clear liquids such as apple juice, ginger ale, broth or 7-Up.  Rest may also help.  Be sure to drink enough fluids.  Move to a regular diet as you feel able.   7. You may have a slight fever.  Call the doctor if your fever is over 100 F (37.7 C) (taken under the tongue) or lasts longer than 24 hours.  8. You may have a dry mouth, a sore throat, muscle aches or trouble sleeping. These should go away after 24 hours.  9. Do not make important or legal decisions.   10. It is recommended to avoid smoking.               Tips for taking pain medications  To get the best pain relief possible, remember these points:    Take pain medications as directed, before pain becomes severe.    Pain medication can upset your stomach: taking it with food may help.    Constipation is a common side effect of pain medication. Drink plenty of  fluids.    Eat foods high in fiber. Take a stool softener if recommended by your doctor or pharmacist.    Do not drink alcohol, drive or operate machinery while taking pain medications.    Ask about other ways to control pain, such as with heat, ice or relaxation.    Tylenol/Acetaminophen Consumption  To help encourage the safe use of acetaminophen, the makers of TYLENOL  have lowered the maximum daily dose for  single-ingredient Extra Strength TYLENOL  (acetaminophen) products sold in the [place] from 8 pills per day (4,000 mg) to 6 pills per day (3,000 mg). The dosing interval has also changed from 2 pills every 4-6 hours to 2 pills every 6 hours.    If you feel your pain relief is insufficient, you may take Tylenol/Acetaminophen in addition to your narcotic pain medication.     Be careful not to exceed 3,000 mg of Tylenol/Acetaminophen in a 24 hour period from all sources.    If you are taking extra strength Tylenol/acetaminophen (500 mg), the maximum dose is 6 tablets in 24 hours.    If you are taking regular strength acetaminophen (325 mg), the maximum dose is 9 tablets in 24 hours.    Call a doctor for any of the followin. Signs of infection (fever, growing tenderness at the surgery site, a large amount of drainage or bleeding, severe pain, foul-smelling drainage, redness, swelling).  2. It has been over 8 to 10 hours since surgery and you are still not able to urinate (pass water).  3. Headache for over 24 hours.  4. Numbness, tingling or weakness the day after surgery (if you had spinal anesthesia).  5. Signs of Covid-19 infection (temperature over 100 degrees, shortness of breath, cough, loss of taste/smell, generalized body aches, persistent headache, chills, sore throat, nausea/vomiting/diarrhea)  Your doctor is:     Interventional Radiology from 8 am to 5 pm @ [phone].   Or dial [phone] and ask for the resident on call for:  Interventional Radiology  For emergency care, call the:  [facility]:  [phone] (TTY for hearing impaired: [phone])

## 2022-01-24 NOTE — ANESTHESIA CARE TRANSFER NOTE
Patient: Mil Seals    Procedure: Procedure(s):  BIOPSY, BONE MARROW       Diagnosis: Multiple myeloma, remission status unspecified (H) [C90.00]  H/O peripheral stem cell transplant (H) [Z94.84]  Diagnosis Additional Information: No value filed.    Anesthesia Type:   MAC     Note:    Oropharynx: spontaneously breathing  Level of Consciousness: awake  Oxygen Supplementation: room air    Independent Airway: airway patency satisfactory and stable  Dentition: dentition unchanged  Vital Signs Stable: post-procedure vital signs reviewed and stable  Report to RN Given: handoff report given  Patient transferred to: Phase II    Handoff Report: Identifed the Patient, Identified the Reponsible Provider, Reviewed the pertinent medical history, Discussed the surgical course, Reviewed Intra-OP anesthesia mangement and issues during anesthesia, Set expectations for post-procedure period and Allowed opportunity for questions and acknowledgement of understanding      Vitals:  Vitals Value Taken Time   BP     Temp     Pulse     Resp     SpO2         Electronically Signed By: CHELLE Hennessy CRNA  January 24, 2022  1:00 PM

## 2022-01-24 NOTE — OP NOTE
"BMT ONC Adult Bone Marrow Biopsy Procedure Note  January 24, 2022  /68   Pulse 78   Temp 96.8  F (36  C) (Temporal)   Resp 16   Ht 1.727 m (5' 8\")   Wt 72.6 kg (160 lb)   SpO2 100%   BMI 24.33 kg/m       Learning needs assessment complete within 12 months? YES    DIAGNOSIS: MM s/p BMT     PROCEDURE: Unilateral Bone Marrow Biopsy and Unilateral Aspirate    LOCATION: [facility] 5th floor-Procedure Room    Patient s identification was positively verified by verbal identification and invasive procedure safety checklist was completed. Informed consent was obtained. Following the administration of Propofol as pre-medication, patient was placed in the prone position and prepped and draped in a sterile manner. Approximately 10 cc of 1% Lidocaine was used over the left posterior iliac spine. Following this a 3 mm incision was made. Trephine bone marrow core(s) was (were) obtained from the [facility]. Bone marrow aspirates were obtained from the [facility]. Aspirates were sent for morphology, immunophenotyping, cytogenetics and molecular diagnostics. A total of approximately 20 ml of marrow was aspirated. Following this procedure a sterile dressing was applied to the bone marrow biopsy site(s). The patient was placed in the supine position to maintain pressure on the biopsy site. Post-procedure wound care instructions were given.     Complications: NO    Pre-procedural pain: 0 out of 10 on the numeric pain rating scale.     Post-procedural pain assessment: See post op nursing notes.     Interventions: NO    Length of procedure:20 minutes or less      Procedure performed by: Rai Sibley PA-C    "

## 2022-01-24 NOTE — ANESTHESIA PREPROCEDURE EVALUATION
Anesthesia Pre-Procedure Evaluation    Patient: Mil Seals   MRN: [id number] : 1960        Preoperative Diagnosis: Multiple myeloma, remission status unspecified (H) [C90.00]  H/O peripheral stem cell transplant (H) [Z94.84]    Procedure : Procedure(s):  BIOPSY, BONE MARROW          History reviewed. No pertinent past medical history.   Past Surgical History:   Procedure Laterality Date     INSERT CATHETER VASCULAR ACCESS Right 10/4/2021    Procedure: DOUBLE LUMEN LARGE BORE APHARESIS CAPABLE CATHETERINSERTION, VASCULAR ACCESS @0800;  Surgeon: Clem Oreilly MD;  Location: [facility] OR     IR CVC TUNNEL PLACEMENT > 5 YRS OF AGE  10/4/2021     IR CVC TUNNEL REMOVAL RIGHT  2021      Allergies   Allergen Reactions     Acyclovir      The patient states this medication resulted in nausea, fatigue, dizziness, and agitation.     Azithromycin Nausea     Codeine      Hydrocodone Nausea and Vomiting     Morphine Nausea and Vomiting     Mushroom      fungus     Penicillins      As child. Nausea, feels ill.      Sulfa Drugs Rash     Long time ago      Social History     Tobacco Use     Smoking status: Former Smoker     Quit date:      Years since quittin.0     Smokeless tobacco: Never Used   Substance Use Topics     Alcohol use: Not Currently      Wt Readings from Last 1 Encounters:   22 72.6 kg (160 lb)        Anesthesia Evaluation   Pt has had prior anesthetic. Type: General and MAC.    No history of anesthetic complications       ROS/MED HX  ENT/Pulmonary:     (+) tobacco use, Past use, asthma     Neurologic:  - neg neurologic ROS     Cardiovascular:  - neg cardiovascular ROS     METS/Exercise Tolerance:     Hematologic:  - neg hematologic  ROS     Musculoskeletal:  - neg musculoskeletal ROS     GI/Hepatic:  - neg GI/hepatic ROS     Renal/Genitourinary:  - neg Renal ROS     Endo:  - neg endo ROS     Psychiatric/Substance Use:  - neg psychiatric ROS     Infectious Disease:        Malignancy:   (+) Malignancy, History of Other.Other CA myeloma status post.    Other:            Physical Exam    Airway  airway exam normal           Respiratory Devices and Support         Dental  no notable dental history         Cardiovascular   cardiovascular exam normal          Pulmonary   pulmonary exam normal                OUTSIDE LABS:  CBC:   Lab Results   Component Value Date    WBC 7.2 01/24/2022    WBC 6.7 11/10/2021    HGB 12.4 01/24/2022    HGB 10.2 (L) 11/10/2021    HCT 38.7 01/24/2022    HCT 31.2 (L) 11/10/2021     01/24/2022     11/10/2021     BMP:   Lab Results   Component Value Date     01/24/2022     11/10/2021    POTASSIUM 4.3 01/24/2022    POTASSIUM 4.1 11/10/2021    CHLORIDE 108 01/24/2022    CHLORIDE 110 (H) 11/10/2021    CO2 27 01/24/2022    CO2 28 11/10/2021    BUN 12 01/24/2022    BUN 10 11/10/2021    CR 0.73 01/24/2022    CR 0.53 11/10/2021    GLC 97 01/24/2022     (H) 11/10/2021     COAGS:   Lab Results   Component Value Date    PTT 26 10/14/2021    INR 0.95 01/24/2022     POC:   Lab Results   Component Value Date    HCGS Negative 09/20/2021     HEPATIC:   Lab Results   Component Value Date    ALBUMIN 4.1 01/24/2022    PROTTOTAL 6.6 (L) 01/24/2022    ALT 22 01/24/2022    AST 14 01/24/2022    ALKPHOS 52 01/24/2022    BILITOTAL 0.2 01/24/2022     OTHER:   Lab Results   Component Value Date    LACT 0.9 10/14/2021    BRUNA 9.2 01/24/2022    PHOS 3.4 01/24/2022    MAG 2.4 (H) 01/24/2022    TSH 2.79 09/24/2021       Anesthesia Plan    ASA Status:  2   NPO Status:  NPO Appropriate    Anesthesia Type: MAC.     - Reason for MAC: straight local not clinically adequate   Induction: Intravenous.           Consents    Anesthesia Plan(s) and associated risks, benefits, and realistic alternatives discussed. Questions answered and patient/representative(s) expressed understanding.    - Discussed:     - Discussed with:  Patient         Postoperative Care    Pain  management: Oral pain medications, Multi-modal analgesia.   PONV prophylaxis: Ondansetron (or other 5HT-3), Background Propofol Infusion     Comments:           H&P reviewed: Unable to attach H&P to encounter due to EHR limitations. H&P Update: appropriate H&P reviewed, patient examined. No interval changes since H&P (within 30 days).         Armaan Soria, DO

## 2022-01-25 LAB
ALBUMIN SERPL ELPH-MCNC: 4.7 G/DL (ref 3.7–5.1)
ALPHA1 GLOB SERPL ELPH-MCNC: 0.3 G/DL (ref 0.2–0.4)
ALPHA2 GLOB SERPL ELPH-MCNC: 0.8 G/DL (ref 0.5–0.9)
B-GLOBULIN SERPL ELPH-MCNC: 0.6 G/DL (ref 0.6–1)
GAMMA GLOB SERPL ELPH-MCNC: 0.1 G/DL (ref 0.7–1.6)
M PROTEIN SERPL ELPH-MCNC: 0 G/DL
PROT PATTERN SERPL ELPH-IMP: ABNORMAL
PROT PATTERN SERPL IFE-IMP: NORMAL

## 2022-01-25 PROCEDURE — 88341 IMHCHEM/IMCYTCHM EA ADD ANTB: CPT | Mod: 26 | Performed by: PATHOLOGY

## 2022-01-25 PROCEDURE — 84165 PROTEIN E-PHORESIS SERUM: CPT | Mod: 26 | Performed by: PATHOLOGY

## 2022-01-25 PROCEDURE — 85097 BONE MARROW INTERPRETATION: CPT | Performed by: PATHOLOGY

## 2022-01-25 PROCEDURE — 85060 BLOOD SMEAR INTERPRETATION: CPT | Performed by: PATHOLOGY

## 2022-01-25 PROCEDURE — 86334 IMMUNOFIX E-PHORESIS SERUM: CPT | Mod: 26 | Performed by: PATHOLOGY

## 2022-01-25 PROCEDURE — 88311 DECALCIFY TISSUE: CPT | Mod: 26 | Performed by: PATHOLOGY

## 2022-01-25 PROCEDURE — 88305 TISSUE EXAM BY PATHOLOGIST: CPT | Mod: 26 | Performed by: PATHOLOGY

## 2022-01-25 PROCEDURE — 88342 IMHCHEM/IMCYTCHM 1ST ANTB: CPT | Mod: 26 | Performed by: PATHOLOGY
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
## 2022-01-28 ENCOUNTER — OFFICE VISIT (OUTPATIENT)
Dept: TRANSPLANT | Facility: CLINIC | Age: 62
End: 2022-01-28
Attending: INTERNAL MEDICINE
Payer: MEDICAID

## 2022-01-28 VITALS
SYSTOLIC BLOOD PRESSURE: 104 MMHG | BODY MASS INDEX: 24.33 KG/M2 | WEIGHT: 160 LBS | OXYGEN SATURATION: 98 % | TEMPERATURE: 97.9 F | DIASTOLIC BLOOD PRESSURE: 72 MMHG | HEART RATE: 91 BPM

## 2022-01-28 DIAGNOSIS — C90.00 MULTIPLE MYELOMA, REMISSION STATUS UNSPECIFIED (H): ICD-10-CM

## 2022-01-28 DIAGNOSIS — R11.2 REFRACTORY NAUSEA AND VOMITING: Primary | ICD-10-CM

## 2022-01-28 DIAGNOSIS — Z52.001 STEM CELL DONOR: ICD-10-CM

## 2022-01-28 DIAGNOSIS — R11.0 NAUSEA: ICD-10-CM

## 2022-01-28 PROCEDURE — M0220 HC INJECTION TIXAGEVIMAB & CILGAVIMAB (EVUSHELD): HCPCS | Performed by: INTERNAL MEDICINE

## 2022-01-28 PROCEDURE — 99215 OFFICE O/P EST HI 40 MIN: CPT | Performed by: INTERNAL MEDICINE

## 2022-01-28 PROCEDURE — 96372 THER/PROPH/DIAG INJ SC/IM: CPT | Performed by: INTERNAL MEDICINE

## 2022-01-28 PROCEDURE — G0463 HOSPITAL OUTPT CLINIC VISIT: HCPCS | Mod: 25

## 2022-01-28 PROCEDURE — G0008 ADMIN INFLUENZA VIRUS VAC: HCPCS | Performed by: INTERNAL MEDICINE

## 2022-01-28 PROCEDURE — 250N000011 HC RX IP 250 OP 636: Performed by: INTERNAL MEDICINE

## 2022-01-28 PROCEDURE — 90682 RIV4 VACC RECOMBINANT DNA IM: CPT | Performed by: INTERNAL MEDICINE

## 2022-01-28 PROCEDURE — G0463 HOSPITAL OUTPT CLINIC VISIT: HCPCS

## 2022-01-28 RX ORDER — HEPARIN SODIUM (PORCINE) LOCK FLUSH IV SOLN 100 UNIT/ML 100 UNIT/ML
5 SOLUTION INTRAVENOUS
Status: CANCELLED | OUTPATIENT
Start: 2022-01-28

## 2022-01-28 RX ORDER — LORAZEPAM 0.5 MG/1
TABLET ORAL
COMMUNITY
Start: 2022-01-19

## 2022-01-28 RX ORDER — HEPARIN SODIUM,PORCINE 10 UNIT/ML
5 VIAL (ML) INTRAVENOUS
Status: CANCELLED | OUTPATIENT
Start: 2022-01-28

## 2022-01-28 RX ORDER — GABAPENTIN 100 MG/1
200 CAPSULE ORAL
COMMUNITY
Start: 2022-01-19

## 2022-01-28 RX ADMIN — Medication: at 13:29

## 2022-01-28 RX ADMIN — INFLUENZA A VIRUS A/WISCONSIN/588/2019 (H1N1) RECOMBINANT HEMAGGLUTININ ANTIGEN, INFLUENZA A VIRUS A/TASMANIA/503/2020 (H3N2) RECOMBINANT HEMAGGLUTININ ANTIGEN, INFLUENZA B VIRUS B/WASHINGTON/02/2019 RECOMBINANT HEMAGGLUTININ ANTIGEN, AND INFLUENZA B VIRUS B/PHUKET/3073/2013 RECOMBINANT HEMAGGLUTININ ANTIGEN 0.5 ML: 45; 45; 45; 45 INJECTION INTRAMUSCULAR at 13:29

## 2022-01-28 ASSESSMENT — PAIN SCALES - GENERAL: PAINLEVEL: NO PAIN (0)

## 2022-01-28 NOTE — NURSING NOTE
"Oncology Rooming Note    January 28, 2022 11:51 AM   Mil Seals is a 61 year old female who presents for:    Chief Complaint   Patient presents with     Oncology Clinic Visit     multiple myeloma     Initial Vitals: /72   Pulse 91   Temp 97.9  F (36.6  C) (Oral)   Wt 72.6 kg (160 lb)   SpO2 98%   BMI 24.33 kg/m   Estimated body mass index is 24.33 kg/m  as calculated from the following:    Height as of 1/24/22: 1.727 m (5' 8\").    Weight as of this encounter: 72.6 kg (160 lb). Body surface area is 1.87 meters squared.  No Pain (0) Comment: Data Unavailable   No LMP recorded. Patient is postmenopausal.  Allergies reviewed: Yes  Medications reviewed: Yes    Medications: Medication refills not needed today.  Pharmacy name entered into [employer]: [id number] - [place], [place] - [address] AT [facility] [address]    Clinical concerns: sciatic pain        Penelope Maldonado CMA            "

## 2022-01-28 NOTE — NURSING NOTE
EVUSHELD Administration Note:  Mil Seals presents today for EVUSHELD.  Patient seen by provider today: Yes: Dr. Escobar   present during visit today: Not Applicable.    Note: The following medication was given:     MEDICATION: Evusheld  ROUTE: IM  SITE: Juanjose ventrogluteal  DOSE: 150mg/1.5ml  LOT #: [id number]  :  [employer]  EXPIRATION DATE:  07/2022  NDC#: 6729-9118-67 .    Confirmed patient received the Emergency Use Authorization Fact Sheet for Patients, Parents and Caregivers prior to receiving medication. Confirmed patient had conversation with provider about medication and no further questions at this time.     Post Injection Assessment:  Patient tolerated injection without incident.   Patient was observed in the room for a minimum of 10 minutes after injection per standard, then remained in the buidling for a total 60 minute observation period.      Discharge Plan:   Patient discharged in stable condition accompanied by: self.  Departure Mode: Ambulatory.    Everette Tracey RN

## 2022-01-28 NOTE — NURSING NOTE
Influenza vaccine given to patient in Right Deltoid . Patient tolerated injection without any incidents.     Has the patient received the information for the injectable influenza vaccine? YES    Everette Tracey RN, CMA ([facility]) January 28, 2022 1:46 PM
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
## 2022-02-02 ENCOUNTER — TELEPHONE (OUTPATIENT)
Dept: TRANSPLANT | Facility: CLINIC | Age: 62
End: 2022-02-02
Payer: MEDICAID

## 2022-02-02 NOTE — TELEPHONE ENCOUNTER
BMT CLINICAL SOCIAL WORK NOTE:    Focus: Reimbursement    Data: Pt is a 61 year old female, currently day +110 s/p auto BMT.    Interventions: Clinical  (CSW) received a request from the pt to call [facility] and [facility] regarding her reimbursements for her lodging and meals. CSW spoke with Ashleigh at [employer], she noted that the pt's request is still being approved. She did submit a request for this to be expedited and they will call the pt with an update.    CSW then called [facility] [phone] and requested update, per the pt she has been reimbursed for lodging, but not meals. TYRONE spoke with Supervisor Dalila with [facility]. She noted that she does see a request and approval for the pt's lodging, but does not see an that a request was ever sent for meals. She notes that due to [facility] not being apart of [place] any longer an appeal would need to be filed with [facility]. Dalila shared for TYRONE to call [phone] and ask for member services or Quality/complaints Dept to file an appeal.    CSW called [facility] and spoke with Dontrell, he shared that appeal need to be completed online at [email or web address] or by sending a request to  Dept of Administration  Division of Hearings  [address]  [place] [address]    CSW forwarded via email (pt's preferred communication method) this information to the pt to have her decide if she would like to do this appeal or not.    Plan: CSW will continue to work with Pt and family to provide supportive counseling and assist with resources as needed. CSW will continue to collaborate with multidisciplinary team regarding Pt's plan of care.     JEMIMA Lyn, [facility]  Pager: [phone]  Phone: [phone]

## 2022-02-06 ENCOUNTER — HEALTH MAINTENANCE LETTER (OUTPATIENT)
Age: 62
End: 2022-02-06

## 2022-02-08 ENCOUNTER — TELEPHONE (OUTPATIENT)
Dept: TRANSPLANT | Facility: CLINIC | Age: 62
End: 2022-02-08
Payer: MEDICAID

## 2022-02-08 NOTE — TELEPHONE ENCOUNTER
BMT CLINICAL SOCIAL WORK NOTE:    Focus: MNET    Data: Pt is a 61 year old female s/p auto BMT, currently day +116    Interventions: Clinical  (CSW) received a call from Shelby at [facility] [phone] indicating that she needed some information regarding the pt's admission dates and length of time needed to be in the [facility] to help get her approval from [facility] for her stay. SW provided this information to Shelby and encouraged her to reach out if she needed additional information.     Plan: CSW will continue to work with Pt and family to provide supportive counseling and assist with resources as needed. CSW will continue to collaborate with multidisciplinary team regarding Pt's plan of care.     JEMIMA Lyn, [facility]  Pager: [phone]  Phone: [phone]

## 2022-03-09 ENCOUNTER — HOSPITAL ENCOUNTER (OUTPATIENT)
Facility: AMBULATORY SURGERY CENTER | Age: 62
End: 2022-03-09
Attending: PHYSICIAN ASSISTANT
Payer: MEDICAID

## 2022-03-09 PROBLEM — Z94.84: Status: ACTIVE | Noted: 2021-12-08

## 2022-03-09 PROBLEM — C90.00 MULTIPLE MYELOMA, REMISSION STATUS UNSPECIFIED (H): Status: ACTIVE | Noted: 2021-09-23

## 2022-03-10 ENCOUNTER — TELEPHONE (OUTPATIENT)
Dept: TRANSPLANT | Facility: CLINIC | Age: 62
End: 2022-03-10
Payer: MEDICAID

## 2022-03-10 NOTE — TELEPHONE ENCOUNTER
Letter faxed to [facility] per request to discuss the pt's need to stay local post transplant. Please see letter for more details.    JEMIMA Lyn, [facility]  Phone [phone]  Pager [phone]

## 2022-03-12 DIAGNOSIS — Z11.59 ENCOUNTER FOR SCREENING FOR OTHER VIRAL DISEASES: Primary | ICD-10-CM

## 2022-03-14 ENCOUNTER — TELEPHONE (OUTPATIENT)
Dept: TRANSPLANT | Facility: CLINIC | Age: 62
End: 2022-03-14
Payer: MEDICAID

## 2022-03-15 NOTE — TELEPHONE ENCOUNTER
BMT CLINICAL SOCIAL WORK NOTE:    Focus: Supportive Counseling     Data: Pt is a 61 year old female s/p auto BMT    Interventions: Clinical  (CSW) received a call from Amber [employer] [phone]  [email or web address]. She noted that she is helping the pt with issues associated to her reimbursement. EMRE provided by Amber signed by the pt. TYRONE provided hope with reimbursement documentation provided by the pt per her request.     EMRE placed to be scanned into chart.     Plan: CSW will continue to work with Pt and family to provide supportive counseling and assist with resources as needed. CSW will continue to collaborate with multidisciplinary team regarding Pt's plan of care.     JEMIMA Lyn, [facility]  Pager: [phone]  Phone: [phone]

## 2022-09-14 DIAGNOSIS — C90.00 MULTIPLE MYELOMA, REMISSION STATUS UNSPECIFIED (H): Primary | ICD-10-CM

## 2022-10-02 ENCOUNTER — HEALTH MAINTENANCE LETTER (OUTPATIENT)
Age: 62
End: 2022-10-02

## 2022-10-07 ENCOUNTER — TELEPHONE (OUTPATIENT)
Dept: TRANSPLANT | Facility: CLINIC | Age: 62
End: 2022-10-07

## 2022-10-07 NOTE — TELEPHONE ENCOUNTER
Called the patient to remind them to collect their 24 hour urine and bring it to their bone marrow biopsy appointment on 10/13/22. A voicemail was left.

## 2022-10-21 ENCOUNTER — DOCUMENTATION ONLY (OUTPATIENT)
Dept: TRANSPLANT | Facility: CLINIC | Age: 62
End: 2022-10-21

## 2022-10-21 ENCOUNTER — TELEPHONE (OUTPATIENT)
Dept: TRANSPLANT | Facility: CLINIC | Age: 62
End: 2022-10-21

## 2022-10-21 NOTE — PROGRESS NOTES
Mil notified our office that she has relocated to [place] and under the care of Dr Gucci Perez a BMT specialist in [facility]. She will not be returning for follow-up care and has requested that future BMT anniversary testing be discontinued.

## 2023-02-11 ENCOUNTER — HEALTH MAINTENANCE LETTER (OUTPATIENT)
Age: 63
End: 2023-02-11

## 2024-03-09 ENCOUNTER — HEALTH MAINTENANCE LETTER (OUTPATIENT)
Age: 64
End: 2024-03-09

## 2024-07-27 ENCOUNTER — HEALTH MAINTENANCE LETTER (OUTPATIENT)
Age: 64
End: 2024-07-27

## 2024-10-05 ENCOUNTER — HEALTH MAINTENANCE LETTER (OUTPATIENT)
Age: 64
End: 2024-10-05

## (undated) DEVICE — PACK CENTRAL LINE INSERTION SAN32CLFCG

## (undated) DEVICE — LINEN TOWEL PACK X5 5464

## (undated) DEVICE — GLOVE PROTEXIS POWDER FREE SMT 7.5  2D72PT75X

## (undated) DEVICE — NDL BX BONE MARROW 11GA 4"

## (undated) DEVICE — DECANTER BAG 2002S

## (undated) DEVICE — GOWN XLG DISP 9545

## (undated) DEVICE — CAP LUER LOCK MALE/FEMALE DUAL 2C6250

## (undated) DEVICE — LINEN GOWN XLG 5407

## (undated) DEVICE — MINI STICK MAX

## (undated) DEVICE — COVER EASY EQUIP BAG W/BAND LATEX FREE EZ-28

## (undated) DEVICE — KIT INTRODUCER FLUENT MICRO 5FRX10CM ECHO TIP KIT-038-04

## (undated) DEVICE — SU MONOCRYL 4-0 P-3 18" UND Y494G

## (undated) DEVICE — ADH SKIN CLOSURE PREMIERPRO EXOFIN MICOR HV 0.5ML 3471

## (undated) DEVICE — SU ETHILON 2-0 FS 18" 664H

## (undated) DEVICE — TRAY BONE MARROW BIOPSY ASC 640 31-0097A

## (undated) DEVICE — COVER ULTRASOUND PROBE W/GEL FLEXI-FEEL 6"X58" LF  25-FF658

## (undated) RX ORDER — ALBUTEROL SULFATE 0.83 MG/ML
SOLUTION RESPIRATORY (INHALATION)
Status: DISPENSED
Start: 2021-09-20

## (undated) RX ORDER — CLINDAMYCIN PHOSPHATE 900 MG/50ML
INJECTION, SOLUTION INTRAVENOUS
Status: DISPENSED
Start: 2021-10-04

## (undated) RX ORDER — LIDOCAINE HYDROCHLORIDE 10 MG/ML
INJECTION, SOLUTION EPIDURAL; INFILTRATION; INTRACAUDAL; PERINEURAL
Status: DISPENSED
Start: 2021-11-12

## (undated) RX ORDER — CALCIUM GLUCONATE 94 MG/ML
INJECTION, SOLUTION INTRAVENOUS
Status: DISPENSED
Start: 2021-10-07

## (undated) RX ORDER — ACETAMINOPHEN 325 MG/1
TABLET ORAL
Status: DISPENSED
Start: 2022-01-24

## (undated) RX ORDER — CALCIUM GLUCONATE 94 MG/ML
INJECTION, SOLUTION INTRAVENOUS
Status: DISPENSED
Start: 2021-10-08

## (undated) RX ORDER — CALCIUM GLUCONATE 94 MG/ML
INJECTION, SOLUTION INTRAVENOUS
Status: DISPENSED
Start: 2021-10-05

## (undated) RX ORDER — CALCIUM GLUCONATE 94 MG/ML
INJECTION, SOLUTION INTRAVENOUS
Status: DISPENSED
Start: 2021-10-06

## (undated) RX ORDER — GABAPENTIN 300 MG/1
CAPSULE ORAL
Status: DISPENSED
Start: 2022-01-24

## (undated) RX ORDER — ONDANSETRON 8 MG/1
TABLET, ORALLY DISINTEGRATING ORAL
Status: DISPENSED
Start: 2021-10-05